# Patient Record
Sex: FEMALE | Race: WHITE | Employment: STUDENT | URBAN - METROPOLITAN AREA
[De-identification: names, ages, dates, MRNs, and addresses within clinical notes are randomized per-mention and may not be internally consistent; named-entity substitution may affect disease eponyms.]

---

## 2017-08-29 ENCOUNTER — HOSPITAL ENCOUNTER (OUTPATIENT)
Dept: PHYSICAL THERAPY | Age: 22
Discharge: HOME | End: 2017-08-29
Payer: COMMERCIAL

## 2017-08-29 PROCEDURE — 97161 PT EVAL LOW COMPLEX 20 MIN: CPT

## 2017-08-29 PROCEDURE — 97140 MANUAL THERAPY 1/> REGIONS: CPT

## 2017-08-29 PROCEDURE — 97110 THERAPEUTIC EXERCISES: CPT

## 2017-08-29 NOTE — PROGRESS NOTES
Ambulatory/Rehab Services H2 Model Falls Risk Assessment    Risk Factor Pts. ·   Confusion/Disorientation/Impulsivity  []    4 ·   Symptomatic Depression  []   2 ·   Altered Elimination  []   1 ·   Dizziness/Vertigo  []   1 ·   Gender (Male)  []   1 ·   Any administered antiepileptics (anticonvulsants):  []   2 ·   Any administered benzodiazepines:  []   1 ·   Visual Impairment (specify):  []   1 ·   Portable Oxygen Use  []   1 ·   Orthostatic ? BP  []   1 ·   History of Recent Falls (within 3 mos.)  []   5     Ability to Rise from Chair (choose one) Pts. ·   Ability to rise in a single movement  [x]   0 ·   Pushes up, successful in one attempt  []   1 ·   Multiple attempts, but successful  []   3 ·   Unable to rise without assistance  []   4   Total: (5 or greater = High Risk) 0     Falls Prevention Plan:   []                Physical Limitations to Exercise (specify):   []                Mobility Assistance Device (type):   []                Exercise/Equipment Adaptation (specify):    ©2010 [facility] [address] Patent #[id number].  Federal Law prohibits the replication, distribution or use without written permission from [facility] [employer]

## 2017-08-29 NOTE — PROGRESS NOTES
REHABILITATION [facility]  : [address] [facility] [address]  [address] [facility] [place].  [phone]   [phone]        OUTPATIENT PHYSICAL THERAPY:Initial Assessment 2017        ICD-10: Treatment Diagnosis: Pain in left foot (M79.672)Muscle weakness (generalized) (M62.81)  Precautions/Allergies:   Review of patient's allergies indicates not on file. Fall Risk Score: 0 (? 5 = High Risk)  MD Orders: Evaluation and treatment  MEDICAL/REFERRING DIAGNOSIS:  Pain in unspecified foot [M79.673]  DATE OF ONSET: 2017  REFERRING PHYSICIAN: Bubba Plata MD  RETURN PHYSICIAN APPOINTMENT: 17     INITIAL ASSESSMENT:  Ms. John Mosley presents with decreased strength in left ankle and myofascial restriction of intrinsic foot flexors. Her impairments limit her ability to ambulate and participation in [employer]. Recommend PT services to remediate her impairments. PROBLEM LIST (Impacting functional limitations):  1. Decreased Strength  2. Decreased ADL/Functional Activities  3. Decreased Ambulation Ability/Technique  4. Decreased Balance  5. Increased Pain  6. Decreased Flexibility/Joint Mobility INTERVENTIONS PLANNED:  1. Balance Exercise  2. Cold  3. Cryotherapy  4. Electrical Stimulation  5. Gait Training  6. Heat  7. Home Exercise Program (HEP)  8. Manual Therapy  9. Range of Motion (ROM)  10. Therapeutic Activites  11. Therapeutic Exercise/Strengthening  12. Ultrasound (US)   TREATMENT PLAN:  Effective Dates: 17 TO 17. Frequency/Duration: 2 times a week for 12 weeks  GOALS: (Goals have been discussed and agreed upon with patient.)  Short-Term Functional Goals: Time Frame: 2 weeks  1. Patient will be independent with HEP without pain. 2. Patient will be able to ambulate without immobilizer boot without increased pain. Discharge Goals: Time Frame: 12 weeks  1.  Patient will have improve LEFS to> 70/80 points allowing her to return to full activity. 2. Patient will have improved ankle mmt to 5-/5 allowing her to ascend/descend stairs without pain. 3. Patient will have improve function allowing her to run 1 mile without increased pain. Rehabilitation Potential For Stated Goals: Good  Regarding [facility] Ananda's therapy, I certify that the treatment plan above will be carried out by a therapist or under their direction. Thank you for this referral,  Charmaine Dorado, PT       Referring Physician Signature: Alcides Howard MD              Date                      HISTORY:   History of Present Injury/Illness (Reason for Referral):  24 y/o F with c/o of left plantar foot pain at her 1st MTP joint since June 2017. She has been wearing boot to walk and she does not have pain walking with the boot. She has 3/10 pain if she walks without the boot recently. She has recently saw a local orthopedic surgeon who diagnosed sesamoiditis and she saw her PT in [facility] who diagnosed sesamoiditis. She has a history of left ankle distal fibular fracture and ankle sprain that she has recovered from. She is on the [employer] team but is out of participation now due to her foot pain. She will be wearing the boot for 2 more weeks until she has f/u with ortho. Past Medical History/Comorbidities:   Ms. Nellie Mckeon  has no past medical history on file. Ms. Nellie Mckeon  has no past surgical history on file. Social History/Living Environment:    Lives in apartment  Prior Level of Function/Work/Activity:  Inpdependent  Previous Treatment Approaches:          none    Current Medications:  No current outpatient prescriptions on file.    Date Last Reviewed:  8/29/2017   # of Personal Factors/Comorbidities that affect the Plan of Care: 0: LOW COMPLEXITY   EXAMINATION:   Observation/Orthostatic Postural Assessment:          Wearing left ankle immobilizer   Palpation:          Increased pain to left plantar MTP joint, left post tibialis  ROM:     WNL Strength:     mmt   ankle DF L 4+ R 5-  Ankle PF L 3+ R 5-  Ankle IN L 3+ R 5-  Ankle EV : 3+ R 5-  1st digit extension L 3+ pain R 5-  1st digit flexion L 3+ pain R 5-      Special Tests:          Windlass painful L ankle  Neurological Screen:        Sensation: light touch intact  Functional Mobility:         Gait/Ambulation:  Without boot, left foot toe-in gait pattern with stance, decreased toe push off left LE  Balance:          SLB WNL without pain    Body Structures Involved:  1. Joints  2. Muscles Body Functions Affected:  1. Neuromusculoskeletal  2. Movement Related Activities and Participation Affected:  1. General Tasks and Demands  2. Mobility  3. Domestic Life  4. Community, Social and [place]   # of elements that affect the Plan of Care: 1-2: LOW COMPLEXITY   CLINICAL PRESENTATION:   Presentation: Stable and uncomplicated: LOW COMPLEXITY   CLINICAL DECISION MAKING:   Outcome Measure: Tool Used: Lower Extremity Functional Scale (LEFS)  Score:  Initial: 42/80 Most Recent: X/80 (Date: -- )   Interpretation of Score: 20 questions each scored on a 5 point scale with 0 representing \"extreme difficulty or unable to perform\" and 4 representing \"no difficulty\". The lower the score, the greater the functional disability. 80/80 represents no disability. Minimal detectable change is 9 points. Score 80 79-63 62-48 47-32 31-16 15-1 0   Modifier CH CI CJ CK CL CM CN     Medical Necessity:   · Patient is expected to demonstrate progress in strength, range of motion and functional technique to increase independence with gait. Reason for Services/Other Comments:  · Patient has been observed to have decreased strength before, during or after an intervention.    Use of outcome tool(s) and clinical judgement create a POC that gives a: Clear prediction of patient's progress: LOW COMPLEXITY   TREATMENT:   (In addition to Assessment/Re-Assessment sessions the following treatments were rendered)  THERAPEUTIC EXERCISE: (see grid for minutes):  Exercises per grid below to improve mobility, strength and balance. Required moderate visual, verbal, manual and tactile cues to promote proper body alignment, promote proper body posture and promote proper body mechanics. Progressed resistance, range and repetitions as indicated. MANUAL THERAPY: (see grid for minutes): Joint mobilization and Soft tissue mobilization was utilized and necessary because of the patient's restricted joint motion, painful spasm, loss of articular motion and restricted motion of soft tissue. MODALITIES: (see grid for minutes): *  Electrical Stimulation Therapy (IFC) was provided with intensity adjusted throughout treatment to patient tolerance. to reduce pain       *  Cold Pack Therapy in order to provide analgesia and reduce inflammation and edema. *  Hot Pack Therapy in order to relieve muscle spasm. Date: 8/29/17       Modalities:                                Therapeutic Exercise: 10 min       Ankle TB 4 dir and first digit DF RTB 30x each       PF stretch 3x30 sec hold       PF MFR with lacrosse ball 3x1'                               Proprioceptive Activities:                                Manual Therapy: 15 mins       MFR PF, post tib, intrinsic flexors, L MTP distraction L ankle               Therapeutic Activities:                                        HEP: Provided HEP on 8/29/17  Treatment/Session Assessment:  Demonstrated good teach back with HEP. · Pain/ Symptoms: Initial:   1/10 Post Session:  1/10 ·   Compliance with Program/Exercises: Will assess as treatment progresses. · Recommendations/Intent for next treatment session: \"Next visit will focus on advancements to more challenging activities\".   Total Treatment Duration:  PT Patient Time In/Time Out  Time In: 1400  [facility]: [address] [facility] [place] [address] [place],

## 2017-08-31 ENCOUNTER — HOSPITAL ENCOUNTER (OUTPATIENT)
Dept: PHYSICAL THERAPY | Age: 22
Discharge: HOME | End: 2017-08-31
Payer: COMMERCIAL

## 2017-08-31 PROCEDURE — 97110 THERAPEUTIC EXERCISES: CPT

## 2017-08-31 PROCEDURE — 97140 MANUAL THERAPY 1/> REGIONS: CPT

## 2017-08-31 PROCEDURE — 97035 APP MDLTY 1+ULTRASOUND EA 15: CPT

## 2017-08-31 NOTE — PROGRESS NOTES
REHABILITATION [facility]  : [address] [facility] [address]  [address] [place] [address] [facility] [place] Vang.  [phone]   [phone]        OUTPATIENT PHYSICAL THERAPY:Daily Note 2017        ICD-10: Treatment Diagnosis: Pain in left foot (M79.672)Muscle weakness (generalized) (M62.81)  Precautions/Allergies:   Review of patient's allergies indicates not on file. Fall Risk Score: 0 (? 5 = High Risk)  MD Orders: Evaluation and treatment  MEDICAL/REFERRING DIAGNOSIS:  Pain in unspecified foot [M79.673]  DATE OF ONSET: 2017  REFERRING PHYSICIAN: Gabriel Gloria MD  RETURN PHYSICIAN APPOINTMENT: 17     INITIAL ASSESSMENT:  Ms. Jose Johnson presents with decreased strength in left ankle and myofascial restriction of intrinsic foot flexors. Her impairments limit her ability to ambulate and participation in [employer]. Recommend PT services to remediate her impairments. PROBLEM LIST (Impacting functional limitations):  1. Decreased Strength  2. Decreased ADL/Functional Activities  3. Decreased Ambulation Ability/Technique  4. Decreased Balance  5. Increased Pain  6. Decreased Flexibility/Joint Mobility INTERVENTIONS PLANNED:  1. Balance Exercise  2. Cold  3. Cryotherapy  4. Electrical Stimulation  5. Gait Training  6. Heat  7. Home Exercise Program (HEP)  8. Manual Therapy  9. Range of Motion (ROM)  10. Therapeutic Activites  11. Therapeutic Exercise/Strengthening  12. Ultrasound (US)   TREATMENT PLAN:  Effective Dates: 17 TO 17. Frequency/Duration: 2 times a week for 12 weeks  GOALS: (Goals have been discussed and agreed upon with patient.)  Short-Term Functional Goals: Time Frame: 2 weeks  1. Patient will be independent with HEP without pain. 2. Patient will be able to ambulate without immobilizer boot without increased pain. Discharge Goals: Time Frame: 12 weeks  1.  Patient will have improve LEFS to> 70/80 points allowing her to return to full activity. 2. Patient will have improved ankle mmt to 5-/5 allowing her to ascend/descend stairs without pain. 3. Patient will have improve function allowing her to run 1 mile without increased pain. Rehabilitation Potential For Stated Goals: Good                HISTORY:   History of Present Injury/Illness (Reason for Referral):  24 y/o F with c/o of left plantar foot pain at her 1st MTP joint since June 2017. She has been wearing boot to walk and she does not have pain walking with the boot. She has 3/10 pain if she walks without the boot recently. She has recently saw a local orthopedic surgeon who diagnosed sesamoiditis and she saw her PT in [facility] who diagnosed sesamoiditis. She has a history of left ankle distal fibular fracture and ankle sprain that she has recovered from. She is on the [employer] team but is out of participation now due to her foot pain. She will be wearing the boot for 2 more weeks until she has f/u with ortho. Past Medical History/Comorbidities:   Ms. Nellie Mckeon  has no past medical history on file. Ms. Nellie Mckeon  has no past surgical history on file. Social History/Living Environment:    Lives in apartment  Prior Level of Function/Work/Activity:  Inpdependent  Previous Treatment Approaches:          none    Current Medications:  No current outpatient prescriptions on file.    Date Last Reviewed:  8/31/2017   EXAMINATION:   Observation/Orthostatic Postural Assessment:          Wearing left ankle immobilizer   Palpation:          Increased pain to left plantar MTP joint, left post tibialis  ROM:     WNL      Strength:     mmt   ankle DF L 4+ R 5-  Ankle PF L 3+ R 5-  Ankle IN L 3+ R 5-  Ankle EV : 3+ R 5-  1st digit extension L 3+ pain R 5-  1st digit flexion L 3+ pain R 5-      Special Tests:          Windlass painful L ankle  Neurological Screen:        Sensation: light touch intact  Functional Mobility:         Gait/Ambulation:  Without boot, left foot toe-in gait pattern with stance, decreased toe push off left LE  Balance:          SLB WNL without pain    Body Structures Involved:  1. Joints  2. Muscles Body Functions Affected:  1. Neuromusculoskeletal  2. Movement Related Activities and Participation Affected:  1. General Tasks and Demands  2. Mobility  3. Domestic Life  4. Community, Social and Civic Life   CLINICAL PRESENTATION:   CLINICAL DECISION MAKING:   Outcome Measure: Tool Used: Lower Extremity Functional Scale (LEFS)  Score:  Initial: 42/80 Most Recent: X/80 (Date: -- )   Interpretation of Score: 20 questions each scored on a 5 point scale with 0 representing \"extreme difficulty or unable to perform\" and 4 representing \"no difficulty\". The lower the score, the greater the functional disability. 80/80 represents no disability. Minimal detectable change is 9 points. Score 80 79-63 62-48 47-32 31-16 15-1 0   Modifier CH CI CJ CK CL CM CN     Medical Necessity:   · Patient is expected to demonstrate progress in strength, range of motion and functional technique to increase independence with gait. Reason for Services/Other Comments:  · Patient has been observed to have decreased strength before, during or after an intervention. TREATMENT:   (In addition to Assessment/Re-Assessment sessions the following treatments were rendered)  THERAPEUTIC EXERCISE: (see grid for minutes):  Exercises per grid below to improve mobility, strength and balance. Required moderate visual, verbal, manual and tactile cues to promote proper body alignment, promote proper body posture and promote proper body mechanics. Progressed resistance, range and repetitions as indicated. MANUAL THERAPY: (see grid for minutes): Joint mobilization and Soft tissue mobilization was utilized and necessary because of the patient's restricted joint motion, painful spasm, loss of articular motion and restricted motion of soft tissue. MODALITIES: (see grid for minutes):       *  Electrical Stimulation Therapy (IFC) was provided with intensity adjusted throughout treatment to patient tolerance. to reduce pain       *  Cold Pack Therapy in order to provide analgesia and reduce inflammation and edema. *  Hot Pack Therapy in order to relieve muscle spasm. Date: 8/29/17 8/31/17  (visit 2)      Modalities:  8 min      %   1mhx 1.5 intesnity                      Therapeutic Exercise: 10 min 15 min      Ankle TB 4 dir and first digit DF RTB 30x each       PF stretch 3x30 sec hold 3x30 sec hold      PF MFR with lacrosse ball 3x1'       Seated HR/TR  30x      Towel in/ev  3' total      SLB iso arch  L 30\"      Proprioceptive Activities:                                Manual Therapy: 15 mins 15 min      MFR PF, post tib, intrinsic flexors, L MTP distraction L ankle FDN/MFR abductor and flexor hallicis              Therapeutic Activities:                                        HEP: Provided HEP on 8/29/17  Treatment/Session Assessment:  SHe reported decreased pain with gait after FDN. · Pain/ Symptoms: Initial:   1/10 \"it still hurts. I am doing my HEP. \" Post Session:  1/10 No increased pain ·   Compliance with Program/Exercises: Will assess as treatment progresses. · Recommendations/Intent for next treatment session: \"Next visit will focus on advancements to more challenging activities\".   Total Treatment Duration:  PT Patient Time In/Time Out  Time In: 1400  Time Out: [address],

## 2017-09-05 ENCOUNTER — HOSPITAL ENCOUNTER (OUTPATIENT)
Dept: PHYSICAL THERAPY | Age: 22
Discharge: HOME | End: 2017-09-05
Payer: COMMERCIAL

## 2017-09-05 PROCEDURE — 97035 APP MDLTY 1+ULTRASOUND EA 15: CPT

## 2017-09-05 PROCEDURE — 97110 THERAPEUTIC EXERCISES: CPT

## 2017-09-05 PROCEDURE — 97140 MANUAL THERAPY 1/> REGIONS: CPT

## 2017-09-05 NOTE — PROGRESS NOTES
REHABILITATION [facility]  : [address] [facility] [address]  [address] [facility] [place].  [phone]   [phone]        OUTPATIENT PHYSICAL THERAPY:Daily Note 2017        ICD-10: Treatment Diagnosis: Pain in left foot (M79.672)Muscle weakness (generalized) (M62.81)  Precautions/Allergies:   Review of patient's allergies indicates not on file. Fall Risk Score: 0 (? 5 = High Risk)  MD Orders: Evaluation and treatment  MEDICAL/REFERRING DIAGNOSIS:  Pain in unspecified foot [M79.673]  DATE OF ONSET: 2017  REFERRING PHYSICIAN: Manda Arevalo MD  RETURN PHYSICIAN APPOINTMENT: 17     INITIAL ASSESSMENT:  Ms. Anson Connell presents with decreased strength in left ankle and myofascial restriction of intrinsic foot flexors. Her impairments limit her ability to ambulate and participation in [email or web address]. Recommend PT services to remediate her impairments. PROBLEM LIST (Impacting functional limitations):  1. Decreased Strength  2. Decreased ADL/Functional Activities  3. Decreased Ambulation Ability/Technique  4. Decreased Balance  5. Increased Pain  6. Decreased Flexibility/Joint Mobility INTERVENTIONS PLANNED:  1. Balance Exercise  2. Cold  3. Cryotherapy  4. Electrical Stimulation  5. Gait Training  6. Heat  7. Home Exercise Program (HEP)  8. Manual Therapy  9. Range of Motion (ROM)  10. Therapeutic Activites  11. Therapeutic Exercise/Strengthening  12. Ultrasound (US)   TREATMENT PLAN:  Effective Dates: 17 TO 17. Frequency/Duration: 2 times a week for 12 weeks  GOALS: (Goals have been discussed and agreed upon with patient.)  Short-Term Functional Goals: Time Frame: 2 weeks  1. Patient will be independent with HEP without pain. 2. Patient will be able to ambulate without immobilizer boot without increased pain. Discharge Goals: Time Frame: 12 weeks  1.  Patient will have improve LEFS to> 70/80 points allowing her to return to full activity. 2. Patient will have improved ankle mmt to 5-/5 allowing her to ascend/descend stairs without pain. 3. Patient will have improve function allowing her to run 1 mile without increased pain. Rehabilitation Potential For Stated Goals: Good                HISTORY:   History of Present Injury/Illness (Reason for Referral):  24 y/o F with c/o of left plantar foot pain at her 1st MTP joint since June 2017. She has been wearing boot to walk and she does not have pain walking with the boot. She has 3/10 pain if she walks without the boot recently. She has recently saw a local orthopedic surgeon who diagnosed sesamoiditis and she saw her PT in [facility] who diagnosed sesamoiditis. She has a history of left ankle distal fibular fracture and ankle sprain that she has recovered from. She is on the [employer] team but is out of participation now due to her foot pain. She will be wearing the boot for 2 more weeks until she has f/u with ortho. Past Medical History/Comorbidities:   Ms. Yesika Bertrand  has no past medical history on file. Ms. Yesika Bertrand  has no past surgical history on file. Social History/Living Environment:    Lives in apartment  Prior Level of Function/Work/Activity:  Inpdependent  Previous Treatment Approaches:          none    Current Medications:  No current outpatient prescriptions on file.    Date Last Reviewed:  9/5/2017   EXAMINATION:   Observation/Orthostatic Postural Assessment:          Wearing left ankle immobilizer   Palpation:          Increased pain to left plantar MTP joint, left post tibialis  ROM:     WNL      Strength:     mmt   ankle DF L 4+ R 5-  Ankle PF L 3+ R 5-  Ankle IN L 3+ R 5-  Ankle EV : 3+ R 5-  1st digit extension L 3+ pain R 5-  1st digit flexion L 3+ pain R 5-      Special Tests:          Windlass painful L ankle  Neurological Screen:        Sensation: light touch intact  Functional Mobility:         Gait/Ambulation:  Without boot, left foot toe-in gait pattern with stance, decreased toe push off left LE  Balance:          SLB WNL without pain    Body Structures Involved:  1. Joints  2. Muscles Body Functions Affected:  1. Neuromusculoskeletal  2. Movement Related Activities and Participation Affected:  1. General Tasks and Demands  2. Mobility  3. Domestic Life  4. Community, Social and Civic Life   CLINICAL PRESENTATION:   CLINICAL DECISION MAKING:   Outcome Measure: Tool Used: Lower Extremity Functional Scale (LEFS)  Score:  Initial: 42/80 Most Recent: X/80 (Date: -- )   Interpretation of Score: 20 questions each scored on a 5 point scale with 0 representing \"extreme difficulty or unable to perform\" and 4 representing \"no difficulty\". The lower the score, the greater the functional disability. 80/80 represents no disability. Minimal detectable change is 9 points. Score 80 79-63 62-48 47-32 31-16 15-1 0   Modifier CH CI CJ CK CL CM CN     Medical Necessity:   · Patient is expected to demonstrate progress in strength, range of motion and functional technique to increase independence with gait. Reason for Services/Other Comments:  · Patient has been observed to have decreased strength before, during or after an intervention. TREATMENT:   (In addition to Assessment/Re-Assessment sessions the following treatments were rendered)  THERAPEUTIC EXERCISE: (see grid for minutes):  Exercises per grid below to improve mobility, strength and balance. Required moderate visual, verbal, manual and tactile cues to promote proper body alignment, promote proper body posture and promote proper body mechanics. Progressed resistance, range and repetitions as indicated. MANUAL THERAPY: (see grid for minutes): Joint mobilization and Soft tissue mobilization was utilized and necessary because of the patient's restricted joint motion, painful spasm, loss of articular motion and restricted motion of soft tissue. MODALITIES: (see grid for minutes):       *  Electrical Stimulation Therapy (IFC) was provided with intensity adjusted throughout treatment to patient tolerance. to reduce pain       *  Cold Pack Therapy in order to provide analgesia and reduce inflammation and edema. *  Hot Pack Therapy in order to relieve muscle spasm. Date: 8/29/17 8/31/17  (visit 2) 9/05/17 (visit 3)     Modalities:  8 min 8 min     %   1mhx 1.5 intesnity 1 Mhz, 1.1 w/cm2                     Therapeutic Exercise: 10 min 15 min 15 min     Ankle TB 4 dir and first digit DF RTB 30x each  x20 each red     PF stretch 3x30 sec hold 3x30 sec hold      PF MFR with lacrosse ball 3x1'       Seated HR/TR  30x x30 each     Ankle circles with board   x30 clockwise and counterclockwise     Towel in/ev  3' total      SLB iso arch  L 30\" 30 sec hold x 2 Left     Proprioceptive Activities:                                Manual Therapy: 15 mins 15 min 20 min     MFR PF, post tib, intrinsic flexors, L MTP distraction L ankle FDN/MFR abductor and flexor hallicis FDN/Dry needling to abductor and flexor hallicis     STM/IASTM   To posterior tibilis and plantar surface of foot     Therapeutic Activities:                                        HEP: Provided HEP on 8/29/17  Treatment/Session Assessment: Dry needling performed by the certified dry needling PT. Pt did not report increased pain with exercise or STM. Continue POC. · Pain/ Symptoms: Initial:   1/10 L foot    Pt states \"I think the dry needling helped. \" Post Session:  1/10 No increased pain ·   Compliance with Program/Exercises: Will assess as treatment progresses. · Recommendations/Intent for next treatment session: \"Next visit will focus on advancements to more challenging activities\".   Total Treatment Duration:  PT Patient Time In/Time Out  Time In: 0245  Time Out: Bess Ricci 27, PTA

## 2017-09-08 ENCOUNTER — HOSPITAL ENCOUNTER (OUTPATIENT)
Dept: PHYSICAL THERAPY | Age: 22
Discharge: HOME | End: 2017-09-08
Payer: COMMERCIAL

## 2017-09-08 PROCEDURE — 97140 MANUAL THERAPY 1/> REGIONS: CPT

## 2017-09-08 PROCEDURE — 97035 APP MDLTY 1+ULTRASOUND EA 15: CPT

## 2017-09-08 PROCEDURE — 97110 THERAPEUTIC EXERCISES: CPT

## 2017-09-08 NOTE — PROGRESS NOTES
REHABILITATION [facility]  : [address] [facility] [place] [address]  [address] [facility] [address] [place].  [phone]   [phone]        OUTPATIENT PHYSICAL THERAPY:Daily Note 2017        ICD-10: Treatment Diagnosis: Pain in left foot (M79.672)Muscle weakness (generalized) (M62.81)  Precautions/Allergies:   Review of patient's allergies indicates not on file. Fall Risk Score: 0 (? 5 = High Risk)  MD Orders: Evaluation and treatment  MEDICAL/REFERRING DIAGNOSIS:  Pain in unspecified foot [M79.673]  DATE OF ONSET: 2017  REFERRING PHYSICIAN: Gabriel Gloria MD  RETURN PHYSICIAN APPOINTMENT: 17     INITIAL ASSESSMENT:  Ms. Jose Johnson presents with decreased strength in left ankle and myofascial restriction of intrinsic foot flexors. Her impairments limit her ability to ambulate and participation in [email or web address]. Recommend PT services to remediate her impairments. PROBLEM LIST (Impacting functional limitations):  1. Decreased Strength  2. Decreased ADL/Functional Activities  3. Decreased Ambulation Ability/Technique  4. Decreased Balance  5. Increased Pain  6. Decreased Flexibility/Joint Mobility INTERVENTIONS PLANNED:  1. Balance Exercise  2. Cold  3. Cryotherapy  4. Electrical Stimulation  5. Gait Training  6. Heat  7. Home Exercise Program (HEP)  8. Manual Therapy  9. Range of Motion (ROM)  10. Therapeutic Activites  11. Therapeutic Exercise/Strengthening  12. Ultrasound (US)   TREATMENT PLAN:  Effective Dates: 17 TO 17. Frequency/Duration: 2 times a week for 12 weeks  GOALS: (Goals have been discussed and agreed upon with patient.)  Short-Term Functional Goals: Time Frame: 2 weeks  1. Patient will be independent with HEP without pain. 2. Patient will be able to ambulate without immobilizer boot without increased pain. Discharge Goals: Time Frame: 12 weeks  1.  Patient will have improve LEFS to> 70/80 points allowing her to return to full activity. 2. Patient will have improved ankle mmt to 5-/5 allowing her to ascend/descend stairs without pain. 3. Patient will have improve function allowing her to run 1 mile without increased pain. Rehabilitation Potential For Stated Goals: Good                HISTORY:   History of Present Injury/Illness (Reason for Referral):  24 y/o F with c/o of left plantar foot pain at her 1st MTP joint since June 2017. She has been wearing boot to walk and she does not have pain walking with the boot. She has 3/10 pain if she walks without the boot recently. She has recently saw a local orthopedic surgeon who diagnosed sesamoiditis and she saw her PT in [facility] who diagnosed sesamoiditis. She has a history of left ankle distal fibular fracture and ankle sprain that she has recovered from. She is on the [employer] team but is out of participation now due to her foot pain. She will be wearing the boot for 2 more weeks until she has f/u with ortho. Past Medical History/Comorbidities:   Ms. Josh Sicard  has no past medical history on file. Ms. Josh Sicard  has no past surgical history on file. Social History/Living Environment:    Lives in apartment  Prior Level of Function/Work/Activity:  Inpdependent  Previous Treatment Approaches:          none    Current Medications:  No current outpatient prescriptions on file.    Date Last Reviewed:  9/8/2017   EXAMINATION:   Observation/Orthostatic Postural Assessment:          Wearing left ankle immobilizer   Palpation:          Increased pain to left plantar MTP joint, left post tibialis  ROM:     WNL      Strength:     mmt   ankle DF L 4+ R 5-  Ankle PF L 3+ R 5-  Ankle IN L 3+ R 5-  Ankle EV : 3+ R 5-  1st digit extension L 3+ pain R 5-  1st digit flexion L 3+ pain R 5-      Special Tests:          Windlass painful L ankle  Neurological Screen:        Sensation: light touch intact  Functional Mobility:         Gait/Ambulation:  Without boot, left foot toe-in gait pattern with stance, decreased toe push off left LE  Balance:          SLB WNL without pain    Body Structures Involved:  1. Joints  2. Muscles Body Functions Affected:  1. Neuromusculoskeletal  2. Movement Related Activities and Participation Affected:  1. General Tasks and Demands  2. Mobility  3. Domestic Life  4. Community, Social and Civic Life   CLINICAL PRESENTATION:   CLINICAL DECISION MAKING:   Outcome Measure: Tool Used: Lower Extremity Functional Scale (LEFS)  Score:  Initial: 42/80 Most Recent: X/80 (Date: -- )   Interpretation of Score: 20 questions each scored on a 5 point scale with 0 representing \"extreme difficulty or unable to perform\" and 4 representing \"no difficulty\". The lower the score, the greater the functional disability. 80/80 represents no disability. Minimal detectable change is 9 points. Score 80 79-63 62-48 47-32 31-16 15-1 0   Modifier CH CI CJ CK CL CM CN     Medical Necessity:   · Patient is expected to demonstrate progress in strength, range of motion and functional technique to increase independence with gait. Reason for Services/Other Comments:  · Patient has been observed to have decreased strength before, during or after an intervention. TREATMENT:   (In addition to Assessment/Re-Assessment sessions the following treatments were rendered)  THERAPEUTIC EXERCISE: (see grid for minutes):  Exercises per grid below to improve mobility, strength and balance. Required moderate visual, verbal, manual and tactile cues to promote proper body alignment, promote proper body posture and promote proper body mechanics. Progressed resistance, range and repetitions as indicated. MANUAL THERAPY: (see grid for minutes): Joint mobilization and Soft tissue mobilization was utilized and necessary because of the patient's restricted joint motion, painful spasm, loss of articular motion and restricted motion of soft tissue. MODALITIES: (see grid for minutes):       *  Electrical Stimulation Therapy (IFC) was provided with intensity adjusted throughout treatment to patient tolerance. to reduce pain       *  Cold Pack Therapy in order to provide analgesia and reduce inflammation and edema. *  Hot Pack Therapy in order to relieve muscle spasm. Date: 8/29/17 8/31/17  (visit 2) 9/05/17 (visit 3) 9/08/17 (visit 4)    Modalities:  8 min 8 min 8 min    %   1mhx 1.5 intesnity 1 Mhz, 1.1 w/cm2 1 Mhz, 1.2 w/cm2                    Therapeutic Exercise: 10 min 15 min 15 min 20 min    Ankle TB 4 dir and first digit DF RTB 30x each  x20 each red 3x10 each blue    PF stretch 3x30 sec hold 3x30 sec hold      PF MFR with lacrosse ball 3x1'       4 way SLR    x10 each direction, BLE    Seated HR/TR  30x x30 each x30 each    Ankle circles with board   x30 clockwise and counterclockwise     Towel curls    x50     Towel in/ev  3' total  x20 each    SLB iso arch  L 30\" 30 sec hold x 2 Left     Proprioceptive Activities:                                Manual Therapy: 15 mins 15 min 20 min 15 min    MFR PF, post tib, intrinsic flexors, L MTP distraction L ankle FDN/MFR abductor and flexor hallicis FDN/Dry needling to abductor and flexor hallicis     STM/IASTM   To posterior tibilis and plantar surface of foot STM to intrinsic foot muscles, gastroc, and post tib    Therapeutic Activities:                                        HEP: Provided HEP on 8/29/17  Treatment/Session Assessment: Pt did not report increased pain and non-weight bearing hip exercises were added to the HEP. Pt was also advised to ice when she has pain. Continue POC. · Pain/ Symptoms: Initial:   3/10 L foot and ankle    Pt states \"I'm not wearing the boot any more so I'm having more pain when I walk to class. \" Post Session:  1/10 No increased pain ·   Compliance with Program/Exercises: Will assess as treatment progresses. · Recommendations/Intent for next treatment session:  \"Next visit will focus on advancements to more challenging activities\".   Total Treatment Duration:  PT Patient Time In/Time Out  Time In: 1145  Time Out: [address], PTA

## 2017-09-11 NOTE — PROGRESS NOTES
REHABILITATION [facility]  : [address] [facility] [address]  [address] [facility] [place].  [phone]   [facility] [phone]        OUTPATIENT PHYSICAL THERAPY:Daily Note 2017        ICD-10: Treatment Diagnosis: Pain in left foot (M79.672)Muscle weakness (generalized) (M62.81)  Precautions/Allergies:   Review of patient's allergies indicates not on file. Fall Risk Score: 0 (? 5 = High Risk)  MD Orders: Evaluation and treatment  MEDICAL/REFERRING DIAGNOSIS:  Pain in unspecified foot [M79.673]  DATE OF ONSET: 2017  REFERRING PHYSICIAN: Manda Arevalo MD  RETURN PHYSICIAN APPOINTMENT: 17     INITIAL ASSESSMENT:  Ms. Anson Connell presents with decreased strength in left ankle and myofascial restriction of intrinsic foot flexors. Her impairments limit her ability to ambulate and participation in [employer]. Recommend PT services to remediate her impairments. PROBLEM LIST (Impacting functional limitations):  1. Decreased Strength  2. Decreased ADL/Functional Activities  3. Decreased Ambulation Ability/Technique  4. Decreased Balance  5. Increased Pain  6. Decreased Flexibility/Joint Mobility INTERVENTIONS PLANNED:  1. Balance Exercise  2. Cold  3. Cryotherapy  4. Electrical Stimulation  5. Gait Training  6. Heat  7. Home Exercise Program (HEP)  8. Manual Therapy  9. Range of Motion (ROM)  10. Therapeutic Activites  11. Therapeutic Exercise/Strengthening  12. Ultrasound (US)   TREATMENT PLAN:  Effective Dates: 17 TO 17. Frequency/Duration: 2 times a week for 12 weeks  GOALS: (Goals have been discussed and agreed upon with patient.)  Short-Term Functional Goals: Time Frame: 2 weeks  1. Patient will be independent with HEP without pain. 2. Patient will be able to ambulate without immobilizer boot without increased pain. Discharge Goals: Time Frame: 12 weeks  1.  Patient will have improve LEFS to> 70/80 points allowing her to return to full activity. 2. Patient will have improved ankle mmt to 5-/5 allowing her to ascend/descend stairs without pain. 3. Patient will have improve function allowing her to run 1 mile without increased pain. Rehabilitation Potential For Stated Goals: Good                HISTORY:   History of Present Injury/Illness (Reason for Referral):  24 y/o F with c/o of left plantar foot pain at her 1st MTP joint since June 2017. She has been wearing boot to walk and she does not have pain walking with the boot. She has 3/10 pain if she walks without the boot recently. She has recently saw a local orthopedic surgeon who diagnosed sesamoiditis and she saw her PT in [facility] who diagnosed sesamoiditis. She has a history of left ankle distal fibular fracture and ankle sprain that she has recovered from. She is on the [employer] team but is out of participation now due to her foot pain. She will be wearing the boot for 2 more weeks until she has f/u with ortho. Past Medical History/Comorbidities:   Ms. Ori Mathews  has no past medical history on file. Ms. Ori Mathews  has no past surgical history on file. Social History/Living Environment:    Lives in apartment  Prior Level of Function/Work/Activity:  Inpdependent  Previous Treatment Approaches:          none    Current Medications:  No current outpatient prescriptions on file.    Date Last Reviewed:  9/12/2017   EXAMINATION:   Observation/Orthostatic Postural Assessment:          Wearing left ankle immobilizer   Palpation:          Increased pain to left plantar MTP joint, left post tibialis  ROM:     WNL      Strength:     mmt   ankle DF L 4+ R 5-  Ankle PF L 3+ R 5-  Ankle IN L 3+ R 5-  Ankle EV : 3+ R 5-  1st digit extension L 3+ pain R 5-  1st digit flexion L 3+ pain R 5-      Special Tests:          Windlass painful L ankle  Neurological Screen:        Sensation: light touch intact  Functional Mobility:         Gait/Ambulation:  Without boot, left foot toe-in gait pattern with stance, decreased toe push off left LE  Balance:          SLB WNL without pain    Body Structures Involved:  1. Joints  2. Muscles Body Functions Affected:  1. Neuromusculoskeletal  2. Movement Related Activities and Participation Affected:  1. General Tasks and Demands  2. Mobility  3. Domestic Life  4. Community, Social and Civic Life   CLINICAL PRESENTATION:   CLINICAL DECISION MAKING:   Outcome Measure: Tool Used: Lower Extremity Functional Scale (LEFS)  Score:  Initial: 42/80 Most Recent: X/80 (Date: -- )   Interpretation of Score: 20 questions each scored on a 5 point scale with 0 representing \"extreme difficulty or unable to perform\" and 4 representing \"no difficulty\". The lower the score, the greater the functional disability. 80/80 represents no disability. Minimal detectable change is 9 points. Score 80 79-63 62-48 47-32 31-16 15-1 0   Modifier CH CI CJ CK CL CM CN     Medical Necessity:   · Patient is expected to demonstrate progress in strength, range of motion and functional technique to increase independence with gait. Reason for Services/Other Comments:  · Patient has been observed to have decreased strength before, during or after an intervention. TREATMENT:   (In addition to Assessment/Re-Assessment sessions the following treatments were rendered)  THERAPEUTIC EXERCISE: (see grid for minutes):  Exercises per grid below to improve mobility, strength and balance. Required moderate visual, verbal, manual and tactile cues to promote proper body alignment, promote proper body posture and promote proper body mechanics. Progressed resistance, range and repetitions as indicated. MANUAL THERAPY: (see grid for minutes): Joint mobilization and Soft tissue mobilization was utilized and necessary because of the patient's restricted joint motion, painful spasm, loss of articular motion and restricted motion of soft tissue. MODALITIES: (see grid for minutes):       *  Electrical Stimulation Therapy (IFC) was provided with intensity adjusted throughout treatment to patient tolerance. to reduce pain       *  Cold Pack Therapy in order to provide analgesia and reduce inflammation and edema. *  Hot Pack Therapy in order to relieve muscle spasm. Date: 8/29/17 8/31/17  (visit 2) 9/05/17 (visit 3) 9/08/17 (visit 4) 9/11/17  (visit 5)   Modalities:  8 min 8 min 8 min    %   1mhx 1.5 intesnity 1 Mhz, 1.1 w/cm2 1 Mhz, 1.2 w/cm2                    Therapeutic Exercise: 10 min 15 min 15 min 20 min 20 min   Ankle TB 4 dir and first digit DF RTB 30x each  x20 each red 3x10 each blue    PF stretch 3x30 sec hold 3x30 sec hold      PF MFR with lacrosse ball 3x1'       4 way SLR    x10 each direction, BLE    Seated HR/TR  30x x30 each x30 each Seated HR on knee extension 20# 30x   Ankle circles with board   x30 clockwise and counterclockwise  baps board L3 15 ea dir   Towel curls    x50     Towel in/ev  3' total  x20 each 1/2 tandem balance on 1/2 foam 2x30 sec B   SLB iso arch  L 30\" 30 sec hold x 2 Left  SLB iso arch with UE rows and HABD RTB 2x15 each   Proprioceptive Activities:                                Manual Therapy: 15 mins 15 min 20 min 15 min 25 mins   MFR PF, post tib, intrinsic flexors, L MTP distraction L ankle FDN/MFR abductor and flexor hallicis FDN/Dry needling to abductor and flexor hallicis  1st MTP joint distraction, peroneaol sesmoid distal/prox mobs, STJ distraction   STM/IASTM   To posterior tibilis and plantar surface of foot STM to intrinsic foot muscles, gastroc, and post tib FDN/MFR lateal gastroc and adductor hallucis   Therapeutic Activities:                                        HEP: Provided HEP on 8/29/17  Treatment/Session Assessment: She reports some pain relief after FDN to adductor hallucis and myofascial decompression taping. She tolerated increased exercise prescription of SLB without c/o of pain. Continue POC.     · Pain/ Symptoms: Initial:   4/10 L foot and ankle    Pt states \"I am not wearing the boot. I am walking on campus with my running shoes. My pain is not much better overall. I felt short term relief with dry needling. Post Session:  1/10 No increased pain ·   Compliance with Program/Exercises: Will assess as treatment progresses. · Recommendations/Intent for next treatment session: \"Next visit will focus on advancements to more challenging activities\".   Total Treatment Duration:  PT Patient Time In/Time Out  Time In: 1400  Time Out: [address] [address]

## 2017-09-12 ENCOUNTER — HOSPITAL ENCOUNTER (OUTPATIENT)
Dept: PHYSICAL THERAPY | Age: 22
Discharge: HOME | End: 2017-09-12
Payer: COMMERCIAL

## 2017-09-12 PROCEDURE — 97110 THERAPEUTIC EXERCISES: CPT

## 2017-09-12 PROCEDURE — 97140 MANUAL THERAPY 1/> REGIONS: CPT

## 2017-09-13 NOTE — PROGRESS NOTES
REHABILITATION [facility]  : 1995 [facility] [address]  [facility], [address] [place] [address]  [phone]   [phone]        OUTPATIENT PHYSICAL THERAPY:Daily Note 2017        ICD-10: Treatment Diagnosis: Pain in left foot (M79.672)Muscle weakness (generalized) (M62.81)  Precautions/Allergies:   Review of patient's allergies indicates not on file. Fall Risk Score: 0 (? 5 = High Risk)  MD Orders: Evaluation and treatment  MEDICAL/REFERRING DIAGNOSIS:  Pain in unspecified foot [M79.673]  DATE OF ONSET: 2017  REFERRING PHYSICIAN: Brittani Reza MD  RETURN PHYSICIAN APPOINTMENT: 17     INITIAL ASSESSMENT:  Ms. Lore Fu presents with decreased strength in left ankle and myofascial restriction of intrinsic foot flexors. Her impairments limit her ability to ambulate and participation in [employer]. Recommend PT services to remediate her impairments. PROBLEM LIST (Impacting functional limitations):  1. Decreased Strength  2. Decreased ADL/Functional Activities  3. Decreased Ambulation Ability/Technique  4. Decreased Balance  5. Increased Pain  6. Decreased Flexibility/Joint Mobility INTERVENTIONS PLANNED:  1. Balance Exercise  2. Cold  3. Cryotherapy  4. Electrical Stimulation  5. Gait Training  6. Heat  7. Home Exercise Program (HEP)  8. Manual Therapy  9. Range of Motion (ROM)  10. Therapeutic Activites  11. Therapeutic Exercise/Strengthening  12. Ultrasound (US)   TREATMENT PLAN:  Effective Dates: 17 TO 17. Frequency/Duration: 2 times a week for 12 weeks  GOALS: (Goals have been discussed and agreed upon with patient.)  Short-Term Functional Goals: Time Frame: 2 weeks  1. Patient will be independent with HEP without pain. 2. Patient will be able to ambulate without immobilizer boot without increased pain. Discharge Goals: Time Frame: 12 weeks  1.  Patient will have improve LEFS to> 70/80 points allowing her to return to full activity. 2. Patient will have improved ankle mmt to 5-/5 allowing her to ascend/descend stairs without pain. 3. Patient will have improve function allowing her to run 1 mile without increased pain. Rehabilitation Potential For Stated Goals: Good                HISTORY:   History of Present Injury/Illness (Reason for Referral):  22 y/o F with c/o of left plantar foot pain at her 1st MTP joint since June 2017. She has been wearing boot to walk and she does not have pain walking with the boot. She has 3/10 pain if she walks without the boot recently. She has recently saw a local orthopedic surgeon who diagnosed sesamoiditis and she saw her PT in [facility] who diagnosed sesamoiditis. She has a history of left ankle distal fibular fracture and ankle sprain that she has recovered from. She is on the [employer] team but is out of participation now due to her foot pain. She will be wearing the boot for 2 more weeks until she has f/u with ortho. Past Medical History/Comorbidities:   Ms. Gael Raymundo  has no past medical history on file. Ms. Geal Raymundo  has no past surgical history on file. Social History/Living Environment:    Lives in apartment  Prior Level of Function/Work/Activity:  Inpdependent  Previous Treatment Approaches:          none    Current Medications:  No current outpatient prescriptions on file.    Date Last Reviewed:  9/14/2017   EXAMINATION:   Observation/Orthostatic Postural Assessment:          Wearing left ankle immobilizer   Palpation:          Increased pain to left plantar MTP joint, left post tibialis  ROM:     WNL      Strength:     mmt   ankle DF L 4+ R 5-  Ankle PF L 3+ R 5-  Ankle IN L 3+ R 5-  Ankle EV : 3+ R 5-  1st digit extension L 3+ pain R 5-  1st digit flexion L 3+ pain R 5-      Special Tests:          Windlass painful L ankle  Neurological Screen:        Sensation: light touch intact  Functional Mobility:         Gait/Ambulation:  Without boot, left foot toe-in gait pattern with stance, decreased toe push off left LE  Balance:          SLB WNL without pain    Body Structures Involved:  1. Joints  2. Muscles Body Functions Affected:  1. Neuromusculoskeletal  2. Movement Related Activities and Participation Affected:  1. General Tasks and Demands  2. Mobility  3. Domestic Life  4. Community, Social and Civic Life   CLINICAL PRESENTATION:   CLINICAL DECISION MAKING:   Outcome Measure: Tool Used: Lower Extremity Functional Scale (LEFS)  Score:  Initial: 42/80 Most Recent: X/80 (Date: -- )   Interpretation of Score: 20 questions each scored on a 5 point scale with 0 representing \"extreme difficulty or unable to perform\" and 4 representing \"no difficulty\". The lower the score, the greater the functional disability. 80/80 represents no disability. Minimal detectable change is 9 points. Score 80 79-63 62-48 47-32 31-16 15-1 0   Modifier CH CI CJ CK CL CM CN     Medical Necessity:   · Patient is expected to demonstrate progress in strength, range of motion and functional technique to increase independence with gait. Reason for Services/Other Comments:  · Patient has been observed to have decreased strength before, during or after an intervention. TREATMENT:   (In addition to Assessment/Re-Assessment sessions the following treatments were rendered)  THERAPEUTIC EXERCISE: (see grid for minutes):  Exercises per grid below to improve mobility, strength and balance. Required moderate visual, verbal, manual and tactile cues to promote proper body alignment, promote proper body posture and promote proper body mechanics. Progressed resistance, range and repetitions as indicated. MANUAL THERAPY: (see grid for minutes): Joint mobilization and Soft tissue mobilization was utilized and necessary because of the patient's restricted joint motion, painful spasm, loss of articular motion and restricted motion of soft tissue. MODALITIES: (see grid for minutes):       *  Electrical Stimulation Therapy (IFC) was provided with intensity adjusted throughout treatment to patient tolerance. to reduce pain       *  Cold Pack Therapy in order to provide analgesia and reduce inflammation and edema. *  Hot Pack Therapy in order to relieve muscle spasm.      Date: 8/29/17 8/31/17  (visit 2) 9/05/17 (visit 3) 9/08/17 (visit 4) 9/11/17  (visit 5) 9/13/17  (visit 6)   Modalities:  8 min 8 min 8 min     %   1mhx 1.5 intesnity 1 Mhz, 1.1 w/cm2 1 Mhz, 1.2 w/cm2                       Therapeutic Exercise: 10 min 15 min 15 min 20 min 20 min 25 mins   Ankle TB 4 dir and first digit DF RTB 30x each  x20 each red 3x10 each blue     PF stretch 3x30 sec hold 3x30 sec hold    STS with RTB hip ABD 15x   PF MFR with lacrosse ball 3x1'     SL clams with RTB 30x   4 way SLR    x10 each direction, BLE     Seated HR/TR  30x x30 each x30 each Seated HR on knee extension 20# 30x 20# 30x   Ankle circles with board   x30 clockwise and counterclockwise  baps board L3 15 ea dir    Towel curls    x50   SLB hip abd into wall 10\"x5 B   Towel in/ev  3' total  x20 each 1/2 tandem balance on 1/2 foam 2x30 sec B    SLB iso arch  L 30\" 30 sec hold x 2 Left  SLB iso arch with UE rows and HABD RTB 2x15 each 3x10 B   Proprioceptive Activities:                                    Manual Therapy: 15 mins 15 min 20 min 15 min 25 mins 20 mins   MFR PF, post tib, intrinsic flexors, L MTP distraction L ankle FDN/MFR abductor and flexor hallicis FDN/Dry needling to abductor and flexor hallicis  1st MTP joint distraction, peroneaol sesmoid distal/prox mobs, STJ distraction repeat   STM/IASTM   To posterior tibilis and plantar surface of foot STM to intrinsic foot muscles, gastroc, and post tib FDN/MFR lateal gastroc and adductor hallucis MFR lateal gastroc and adductor hallucis   Therapeutic Activities:                                             HEP: Provided HEP on 8/29/17  Treatment/Session Assessment: She had significant difficult with body mechanics with knee valgus. She had difficulty making correction. Continue POC. · Pain/ Symptoms: Initial:   3/10 L foot and ankle    Pt states \"My pain is a little better. \" Post Session:  1/10 No increased pain ·   Compliance with Program/Exercises: Will assess as treatment progresses. · Recommendations/Intent for next treatment session: \"Next visit will focus on advancements to more challenging activities\".   Total Treatment Duration:  PT Patient Time In/Time Out  Time In: 1151  Time Out: 2000 [facility]

## 2017-09-14 ENCOUNTER — HOSPITAL ENCOUNTER (OUTPATIENT)
Dept: PHYSICAL THERAPY | Age: 22
Discharge: HOME | End: 2017-09-14
Payer: COMMERCIAL

## 2017-09-14 PROCEDURE — 97110 THERAPEUTIC EXERCISES: CPT

## 2017-09-14 PROCEDURE — 97140 MANUAL THERAPY 1/> REGIONS: CPT

## 2017-09-20 ENCOUNTER — HOSPITAL ENCOUNTER (OUTPATIENT)
Dept: PHYSICAL THERAPY | Age: 22
Discharge: HOME | End: 2017-09-20
Payer: COMMERCIAL

## 2017-09-20 PROCEDURE — 97140 MANUAL THERAPY 1/> REGIONS: CPT

## 2017-09-20 PROCEDURE — 97035 APP MDLTY 1+ULTRASOUND EA 15: CPT

## 2017-09-20 PROCEDURE — 97110 THERAPEUTIC EXERCISES: CPT

## 2017-09-20 NOTE — PROGRESS NOTES
REHABILITATION [facility]  : 1995 [facility] [address]  [address] [facility] [place].  [phone]   [phone]        OUTPATIENT PHYSICAL THERAPY:Daily Note 2017        ICD-10: Treatment Diagnosis: Pain in left foot (M79.672)Muscle weakness (generalized) (M62.81)  Precautions/Allergies:   Review of patient's allergies indicates not on file. Fall Risk Score: 0 (? 5 = High Risk)  MD Orders: Evaluation and treatment  MEDICAL/REFERRING DIAGNOSIS:  Pain in unspecified foot [M79.673]  DATE OF ONSET: 2017  REFERRING PHYSICIAN: Katia Dela Cruz MD  RETURN PHYSICIAN APPOINTMENT: 17     INITIAL ASSESSMENT:  Ms. Benjy Arevalo presents with decreased strength in left ankle and myofascial restriction of intrinsic foot flexors. Her impairments limit her ability to ambulate and participation in [employer]. Recommend PT services to remediate her impairments. PROBLEM LIST (Impacting functional limitations):  1. Decreased Strength  2. Decreased ADL/Functional Activities  3. Decreased Ambulation Ability/Technique  4. Decreased Balance  5. Increased Pain  6. Decreased Flexibility/Joint Mobility INTERVENTIONS PLANNED:  1. Balance Exercise  2. Cold  3. Cryotherapy  4. Electrical Stimulation  5. Gait Training  6. Heat  7. Home Exercise Program (HEP)  8. Manual Therapy  9. Range of Motion (ROM)  10. Therapeutic Activites  11. Therapeutic Exercise/Strengthening  12. Ultrasound (US)   TREATMENT PLAN:  Effective Dates: 17 TO 17. Frequency/Duration: 2 times a week for 12 weeks  GOALS: (Goals have been discussed and agreed upon with patient.)  Short-Term Functional Goals: Time Frame: 2 weeks  1. Patient will be independent with HEP without pain. 2. Patient will be able to ambulate without immobilizer boot without increased pain. Discharge Goals: Time Frame: 12 weeks  1.  Patient will have improve LEFS to> 70/80 points allowing her to return to full activity. 2. Patient will have improved ankle mmt to 5-/5 allowing her to ascend/descend stairs without pain. 3. Patient will have improve function allowing her to run 1 mile without increased pain. Rehabilitation Potential For Stated Goals: Good                HISTORY:   History of Present Injury/Illness (Reason for Referral):  24 y/o F with c/o of left plantar foot pain at her 1st MTP joint since June 2017. She has been wearing boot to walk and she does not have pain walking with the boot. She has 3/10 pain if she walks without the boot recently. She has recently saw a local orthopedic surgeon who diagnosed sesamoiditis and she saw her PT in [facility] who diagnosed sesamoiditis. She has a history of left ankle distal fibular fracture and ankle sprain that she has recovered from. She is on the [employer] team but is out of participation now due to her foot pain. She will be wearing the boot for 2 more weeks until she has f/u with ortho. Past Medical History/Comorbidities:   Ms. Reta Johnson  has no past medical history on file. Ms. Reta Johnson  has no past surgical history on file. Social History/Living Environment:    Lives in apartment  Prior Level of Function/Work/Activity:  Inpdependent  Previous Treatment Approaches:          none    Current Medications:  No current outpatient prescriptions on file.    Date Last Reviewed:  9/20/2017   EXAMINATION:   Observation/Orthostatic Postural Assessment:          Wearing left ankle immobilizer   Palpation:          Increased pain to left plantar MTP joint, left post tibialis  ROM:     WNL      Strength:     mmt   ankle DF L 4+ R 5-  Ankle PF L 3+ R 5-  Ankle IN L 3+ R 5-  Ankle EV : 3+ R 5-  1st digit extension L 3+ pain R 5-  1st digit flexion L 3+ pain R 5-      Special Tests:          Windlass painful L ankle  Neurological Screen:        Sensation: light touch intact  Functional Mobility:         Gait/Ambulation:  Without boot, left foot toe-in gait pattern with stance, decreased toe push off left LE  Balance:          SLB WNL without pain    Body Structures Involved:  1. Joints  2. Muscles Body Functions Affected:  1. Neuromusculoskeletal  2. Movement Related Activities and Participation Affected:  1. General Tasks and Demands  2. Mobility  3. Domestic Life  4. Community, Social and Civic Life   CLINICAL PRESENTATION:   CLINICAL DECISION MAKING:   Outcome Measure: Tool Used: Lower Extremity Functional Scale (LEFS)  Score:  Initial: 42/80 Most Recent: X/80 (Date: -- )   Interpretation of Score: 20 questions each scored on a 5 point scale with 0 representing \"extreme difficulty or unable to perform\" and 4 representing \"no difficulty\". The lower the score, the greater the functional disability. 80/80 represents no disability. Minimal detectable change is 9 points. Score 80 79-63 62-48 47-32 31-16 15-1 0   Modifier CH CI CJ CK CL CM CN     Medical Necessity:   · Patient is expected to demonstrate progress in strength, range of motion and functional technique to increase independence with gait. Reason for Services/Other Comments:  · Patient has been observed to have decreased strength before, during or after an intervention. TREATMENT:   (In addition to Assessment/Re-Assessment sessions the following treatments were rendered)  THERAPEUTIC EXERCISE: (see grid for minutes):  Exercises per grid below to improve mobility, strength and balance. Required moderate visual, verbal, manual and tactile cues to promote proper body alignment, promote proper body posture and promote proper body mechanics. Progressed resistance, range and repetitions as indicated. MANUAL THERAPY: (see grid for minutes): Joint mobilization and Soft tissue mobilization was utilized and necessary because of the patient's restricted joint motion, painful spasm, loss of articular motion and restricted motion of soft tissue. MODALITIES: (see grid for minutes):       *  Electrical Stimulation Therapy (IFC) was provided with intensity adjusted throughout treatment to patient tolerance. to reduce pain       *  Cold Pack Therapy in order to provide analgesia and reduce inflammation and edema. *  Hot Pack Therapy in order to relieve muscle spasm.      Date: 8/29/17 8/31/17  (visit 2) 9/05/17 (visit 3) 9/08/17 (visit 4) 9/11/17  (visit 5) 9/13/17  (visit 6) 9/20/17 (visit 7)   Modalities:  8 min 8 min 8 min   8 min   %   1mhx 1.5 intesnity 1 Mhz, 1.1 w/cm2 1 Mhz, 1.2 w/cm2   Continuous, 1 mhz, 1.2 w/cm2                       Therapeutic Exercise: 10 min 15 min 15 min 20 min 20 min 25 mins 20 min   Ankle TB 4 dir and first digit DF RTB 30x each  x20 each red 3x10 each blue      PF stretch 3x30 sec hold 3x30 sec hold    STS with RTB hip ABD 15x 1 min gastroc stretch   PF MFR with lacrosse ball 3x1'     SL clams with RTB 30x Lateral walk with knee flexed 2 laps   4 way SLR    x10 each direction, BLE      Seated HR/TR  30x x30 each x30 each Seated HR on knee extension 20# 30x 20# 30x x30 heel raises on incline   Ankle circles with board   x30 clockwise and counterclockwise  baps board L3 15 ea dir     Towel curls    x50   SLB hip abd into wall 10\"x5 B 30 sec hold x 4 SLB hip abduction into wall   Towel in/ev  3' total  x20 each 1/2 tandem balance on 1/2 foam 2x30 sec B     SLB iso arch  L 30\" 30 sec hold x 2 Left  SLB iso arch with UE rows and HABD RTB 2x15 each 3x10 B    Proprioceptive Activities:                                        Manual Therapy: 15 mins 15 min 20 min 15 min 25 mins 20 mins 15 min   MFR PF, post tib, intrinsic flexors, L MTP distraction L ankle FDN/MFR abductor and flexor hallicis FDN/Dry needling to abductor and flexor hallicis  1st MTP joint distraction, peroneaol sesmoid distal/prox mobs, STJ distraction repeat    STM/IASTM   To posterior tibilis and plantar surface of foot STM to intrinsic foot muscles, gastroc, and post tib FDN/MFR lateal gastroc and adductor hallucis MFR lateal gastroc and adductor hallucis To gastroc and adductor hallucis   Therapeutic Activities:          Step ups (forward) with lateral resistance       x15 BLE onto 10 inch with red band above knee                                 HEP: Provided HEP on 8/29/17  Treatment/Session Assessment: Pt reported mild pain during heel raises in the first metatarsal head. Pt can correct knee valgus with verbal cues. Continue POC. · Pain/ Symptoms: Initial:   3-4/10 L foot and ankle    Pt states \"I'm having pain in the ball of the big toe when I walk. \" Post Session: no increased pain ·   Compliance with Program/Exercises: Will assess as treatment progresses. · Recommendations/Intent for next treatment session: \"Next visit will focus on advancements to more challenging activities\".   Total Treatment Duration:  PT Patient Time In/Time Out  Time In: 0930  Time Out: 37[address] [facility]

## 2017-09-22 ENCOUNTER — HOSPITAL ENCOUNTER (OUTPATIENT)
Dept: PHYSICAL THERAPY | Age: 22
Discharge: HOME | End: 2017-09-22
Payer: COMMERCIAL

## 2017-09-22 PROCEDURE — 97140 MANUAL THERAPY 1/> REGIONS: CPT

## 2017-09-22 PROCEDURE — 97110 THERAPEUTIC EXERCISES: CPT

## 2017-09-22 NOTE — PROGRESS NOTES
[facility]  : [address] [facility] [address]  [address] [facility] [place].  [phone]   [phone]        OUTPATIENT PHYSICAL THERAPY:Daily Note and Progress Report 2017        ICD-10: Treatment Diagnosis: Pain in left foot (M79.672)Muscle weakness (generalized) (M62.81)  Precautions/Allergies:   Review of patient's allergies indicates not on file. Fall Risk Score: 0 (? 5 = High Risk)  MD Orders: Evaluation and treatment  MEDICAL/REFERRING DIAGNOSIS:  Pain in unspecified foot [M79.673]  DATE OF ONSET: 2017  REFERRING PHYSICIAN: Dana Bolden MD  RETURN PHYSICIAN APPOINTMENT: 17     INITIAL ASSESSMENT:  Ms. Raquel Jerome presents with decreased strength in left ankle and myofascial restriction of intrinsic foot flexors. Her impairments limit her ability to ambulate and participation in [employer]. Recommend PT services to remediate her impairments. PROBLEM LIST (Impacting functional limitations):  1. Decreased Strength  2. Decreased ADL/Functional Activities  3. Decreased Ambulation Ability/Technique  4. Decreased Balance  5. Increased Pain  6. Decreased Flexibility/Joint Mobility INTERVENTIONS PLANNED:  1. Balance Exercise  2. Cold  3. Cryotherapy  4. Electrical Stimulation  5. Gait Training  6. Heat  7. Home Exercise Program (HEP)  8. Manual Therapy  9. Range of Motion (ROM)  10. Therapeutic Activites  11. Therapeutic Exercise/Strengthening  12. Ultrasound (US)   TREATMENT PLAN:  Effective Dates: 17 TO 17. Frequency/Duration: 2 times a week for 12 weeks  GOALS: (Goals have been discussed and agreed upon with patient.)  Short-Term Functional Goals: Time Frame: 2 weeks  1. Patient will be independent with HEP without pain. 2. Patient will be able to ambulate without immobilizer boot without increased pain. Discharge Goals: Time Frame: 12 weeks  1.  Patient will have improve LEFS to> 70/80 points allowing her to return to full activity. 2. Patient will have improved ankle mmt to 5-/5 allowing her to ascend/descend stairs without pain. 3. Patient will have improve function allowing her to run 1 mile without increased pain. Rehabilitation Potential For Stated Goals: Good                HISTORY:   History of Present Injury/Illness (Reason for Referral):  22 y/o F with c/o of left plantar foot pain at her 1st MTP joint since June 2017. She has been wearing boot to walk and she does not have pain walking with the boot. She has 3/10 pain if she walks without the boot recently. She has recently saw a local orthopedic surgeon who diagnosed sesamoiditis and she saw her PT in [facility] who diagnosed sesamoiditis. She has a history of left ankle distal fibular fracture and ankle sprain that she has recovered from. She is on the [employer] team but is out of participation now due to her foot pain. She will be wearing the boot for 2 more weeks until she has f/u with ortho. Past Medical History/Comorbidities:   Ms. Reta Johnson  has no past medical history on file. Ms. Reta Johnson  has no past surgical history on file. Social History/Living Environment:    Lives in apartment  Prior Level of Function/Work/Activity:  Inpdependent  Previous Treatment Approaches:          none    Current Medications:  No current outpatient prescriptions on file.    Date Last Reviewed:  9/22/2017   EXAMINATION:   Observation/Orthostatic Postural Assessment:          Wearing left ankle immobilizer   Palpation:          Increased pain to left plantar MTP joint, left post tibialis  ROM:     WNL      Strength:     mmt   ankle DF L 4+ R 5-  Ankle PF L 3+ R 5-  Ankle IN L 3+ R 5-  Ankle EV : 3+ R 5-  1st digit extension L 3+ pain R 5-  1st digit flexion L 3+ pain R 5-      Special Tests:          Windlass painful L ankle  Neurological Screen:        Sensation: light touch intact  Functional Mobility:         Gait/Ambulation:  Without boot, left foot toe-in gait pattern with stance, decreased toe push off left LE  Balance:          SLB WNL without pain    Body Structures Involved:  1. Joints  2. Muscles Body Functions Affected:  1. Neuromusculoskeletal  2. Movement Related Activities and Participation Affected:  1. General Tasks and Demands  2. Mobility  3. Domestic Life  4. Community, Social and Civic Life   CLINICAL PRESENTATION:   CLINICAL DECISION MAKING:   Outcome Measure: Tool Used: Lower Extremity Functional Scale (LEFS)  Score:  Initial: 42/80 Most Recent: X/80 (Date: -- )   Interpretation of Score: 20 questions each scored on a 5 point scale with 0 representing \"extreme difficulty or unable to perform\" and 4 representing \"no difficulty\". The lower the score, the greater the functional disability. 80/80 represents no disability. Minimal detectable change is 9 points. Score 80 79-63 62-48 47-32 31-16 15-1 0   Modifier CH CI CJ CK CL CM CN     Medical Necessity:   · Patient is expected to demonstrate progress in strength, range of motion and functional technique to increase independence with gait. Reason for Services/Other Comments:  · Patient has been observed to have decreased strength before, during or after an intervention. TREATMENT:   (In addition to Assessment/Re-Assessment sessions the following treatments were rendered)  THERAPEUTIC EXERCISE: (see grid for minutes):  Exercises per grid below to improve mobility, strength and balance. Required moderate visual, verbal, manual and tactile cues to promote proper body alignment, promote proper body posture and promote proper body mechanics. Progressed resistance, range and repetitions as indicated. MANUAL THERAPY: (see grid for minutes): Joint mobilization and Soft tissue mobilization was utilized and necessary because of the patient's restricted joint motion, painful spasm, loss of articular motion and restricted motion of soft tissue. MODALITIES: (see grid for minutes):       *  Electrical Stimulation Therapy (IFC) was provided with intensity adjusted throughout treatment to patient tolerance. to reduce pain       *  Cold Pack Therapy in order to provide analgesia and reduce inflammation and edema. *  Hot Pack Therapy in order to relieve muscle spasm.      Date: 8/29/17 8/31/17  (visit 2) 9/05/17 (visit 3) 9/08/17 (visit 4) 9/11/17  (visit 5) 9/13/17  (visit 6) 9/20/17 (visit 7) 9/22/17  (visit 8)   Modalities:  8 min 8 min 8 min   8 min    %   1mhx 1.5 intesnity 1 Mhz, 1.1 w/cm2 1 Mhz, 1.2 w/cm2   Continuous, 1 mhz, 1.2 w/cm2                          Therapeutic Exercise: 10 min 15 min 15 min 20 min 20 min 25 mins 20 min 30 mins   Ankle TB 4 dir and first digit DF RTB 30x each  x20 each red 3x10 each blue    SL squat 3x10 B LE   PF stretch 3x30 sec hold 3x30 sec hold    STS with RTB hip ABD 15x 1 min gastroc stretch 1 min slant board and 1 min PF stretch on 1/2 foam   PF MFR with lacrosse ball 3x1'     SL clams with RTB 30x Lateral walk with knee flexed 2 laps 3 laps in squat position 60# on ankles   4 way SLR    x10 each direction, BLE       Seated HR/TR  30x x30 each x30 each Seated HR on knee extension 20# 30x 20# 30x x30 heel raises on incline 30x    Ankle circles with board   x30 clockwise and counterclockwise  baps board L3 15 ea dir   Heel raises seated with 25# resistance on knee extension machine   Towel curls    x50   SLB hip abd into wall 10\"x5 B 30 sec hold x 4 SLB hip abduction into wall    Towel in/ev  3' total  x20 each 1/2 tandem balance on 1/2 foam 2x30 sec B   REtro gait on TM 5' incline 8% 1.5 mph   SLB iso arch  L 30\" 30 sec hold x 2 Left  SLB iso arch with UE rows and HABD RTB 2x15 each 3x10 B     Proprioceptive Activities:                                            Manual Therapy: 15 mins 15 min 20 min 15 min 25 mins 20 mins 15 min 15 mins   MFR PF, post tib, intrinsic flexors, L MTP distraction L ankle FDN/MFR abductor and flexor hallicis FDN/Dry needling to abductor and flexor hallicis  1st MTP joint distraction, peroneaol sesmoid distal/prox mobs, STJ distraction repeat  repeat   STM/IASTM   To posterior tibilis and plantar surface of foot STM to intrinsic foot muscles, gastroc, and post tib FDN/MFR lateal gastroc and adductor hallucis MFR lateal gastroc and adductor hallucis To gastroc and adductor hallucis MFR/FDN add ferrara, post tib, flex ferrara   Therapeutic Activities:           Step ups (forward) with lateral resistance       x15 BLE onto 10 inch with red band above knee                                     HEP: Provided HEP on 8/29/17  Treatment/Session Assessment: She is making minimal progress in symptoms. She has improve body mechanics of B LE. She was issued a MT pad today to off-load the 1st MT head. She had report of decreased pain with met Continue POC. · Pain/ Symptoms: Initial:   3-4/10 L foot and ankle    Pt states \"It still hurts about the same as last visit. \" Post Session: no increased pain ·   Compliance with Program/Exercises: Will assess as treatment progresses. · Recommendations/Intent for next treatment session: \"Next visit will focus on advancements to more challenging activities\".   Total Treatment Duration:  PT Patient Time In/Time Out  Time In: 1135  Time Out: MATIAS/ Dawson Morales 19

## 2017-09-25 NOTE — PROGRESS NOTES
[facility]  : [address] [facility] [address], [place] [address].  [phone]   [phone]        OUTPATIENT PHYSICAL THERAPY:Daily Note 2017        ICD-10: Treatment Diagnosis: Pain in left foot (M79.672)Muscle weakness (generalized) (M62.81)  Precautions/Allergies:   Review of patient's allergies indicates not on file. Fall Risk Score: 0 (? 5 = High Risk)  MD Orders: Evaluation and treatment  MEDICAL/REFERRING DIAGNOSIS:  Pain in unspecified foot [M79.673]  DATE OF ONSET: 2017  REFERRING PHYSICIAN: Syed Power MD  RETURN PHYSICIAN APPOINTMENT: 17     INITIAL ASSESSMENT:  Ms. Chelsey Diaz presents with decreased strength in left ankle and myofascial restriction of intrinsic foot flexors. Her impairments limit her ability to ambulate and participation in [employer]. Recommend PT services to remediate her impairments. PROBLEM LIST (Impacting functional limitations):  1. Decreased Strength  2. Decreased ADL/Functional Activities  3. Decreased Ambulation Ability/Technique  4. Decreased Balance  5. Increased Pain  6. Decreased Flexibility/Joint Mobility INTERVENTIONS PLANNED:  1. Balance Exercise  2. Cold  3. Cryotherapy  4. Electrical Stimulation  5. Gait Training  6. Heat  7. Home Exercise Program (HEP)  8. Manual Therapy  9. Range of Motion (ROM)  10. Therapeutic Activites  11. Therapeutic Exercise/Strengthening  12. Ultrasound (US)   TREATMENT PLAN:  Effective Dates: 17 TO 17. Frequency/Duration: 2 times a week for 12 weeks  GOALS: (Goals have been discussed and agreed upon with patient.)  Short-Term Functional Goals: Time Frame: 2 weeks  1. Patient will be independent with HEP without pain. 2. Patient will be able to ambulate without immobilizer boot without increased pain. Discharge Goals: Time Frame: 12 weeks  1.  Patient will have improve LEFS to> 70/80 points allowing her to return to full activity. 2. Patient will have improved ankle mmt to 5-/5 allowing her to ascend/descend stairs without pain. 3. Patient will have improve function allowing her to run 1 mile without increased pain. Rehabilitation Potential For Stated Goals: Good                HISTORY:   History of Present Injury/Illness (Reason for Referral):  22 y/o F with c/o of left plantar foot pain at her 1st MTP joint since June 2017. She has been wearing boot to walk and she does not have pain walking with the boot. She has 3/10 pain if she walks without the boot recently. She has recently saw a local orthopedic surgeon who diagnosed sesamoiditis and she saw her PT in [facility] who diagnosed sesamoiditis. She has a history of left ankle distal fibular fracture and ankle sprain that she has recovered from. She is on the [employer] team but is out of participation now due to her foot pain. She will be wearing the boot for 2 more weeks until she has f/u with ortho. Past Medical History/Comorbidities:   Ms. Estelita Rosenbaum  has no past medical history on file. Ms. Estelita Rosenbaum  has no past surgical history on file. Social History/Living Environment:    Lives in apartment  Prior Level of Function/Work/Activity:  Inpdependent  Previous Treatment Approaches:          none    Current Medications:  No current outpatient prescriptions on file.    Date Last Reviewed:  9/26/2017   EXAMINATION:   Observation/Orthostatic Postural Assessment:          Wearing left ankle immobilizer   Palpation:          Increased pain to left plantar MTP joint, left post tibialis  ROM:     WNL      Strength:     mmt   ankle DF L 4+ R 5-  Ankle PF L 3+ R 5-  Ankle IN L 3+ R 5-  Ankle EV : 3+ R 5-  1st digit extension L 3+ pain R 5-  1st digit flexion L 3+ pain R 5-   9/26/17  mmt 4+/5 B LE   Special Tests:          Windlass painful L ankle  Neurological Screen:        Sensation: light touch intact  Functional Mobility:         Gait/Ambulation:  Without boot, left foot toe-in gait pattern with stance, decreased toe push off left LE       9/26/17 SFMA squat: genu valgus B, SLB genu valgus navicular drop B  Balance:          SLB WNL without pain    Body Structures Involved:  1. Joints  2. Muscles Body Functions Affected:  1. Neuromusculoskeletal  2. Movement Related Activities and Participation Affected:  1. General Tasks and Demands  2. Mobility  3. Domestic Life  4. Community, Social and Civic Life   CLINICAL PRESENTATION:   CLINICAL DECISION MAKING:   Outcome Measure: Tool Used: Lower Extremity Functional Scale (LEFS)  Score:  Initial: 42/80 Most Recent: X/80 (Date: -- )   Interpretation of Score: 20 questions each scored on a 5 point scale with 0 representing \"extreme difficulty or unable to perform\" and 4 representing \"no difficulty\". The lower the score, the greater the functional disability. 80/80 represents no disability. Minimal detectable change is 9 points. Score 80 79-63 62-48 47-32 31-16 15-1 0   Modifier CH CI CJ CK CL CM CN     Medical Necessity:   · Patient is expected to demonstrate progress in strength, range of motion and functional technique to increase independence with gait. Reason for Services/Other Comments:  · Patient has been observed to have decreased strength before, during or after an intervention. TREATMENT:   (In addition to Assessment/Re-Assessment sessions the following treatments were rendered)  THERAPEUTIC EXERCISE: (see grid for minutes):  Exercises per grid below to improve mobility, strength and balance. Required moderate visual, verbal, manual and tactile cues to promote proper body alignment, promote proper body posture and promote proper body mechanics. Progressed resistance, range and repetitions as indicated.   MANUAL THERAPY: (see grid for minutes): Joint mobilization and Soft tissue mobilization was utilized and necessary because of the patient's restricted joint motion, painful spasm, loss of articular motion and restricted motion of soft tissue. MODALITIES: (see grid for minutes): *  Electrical Stimulation Therapy (IFC) was provided with intensity adjusted throughout treatment to patient tolerance. to reduce pain       *  Cold Pack Therapy in order to provide analgesia and reduce inflammation and edema. *  Hot Pack Therapy in order to relieve muscle spasm.      Date: 8/29/17 8/31/17  (visit 2) 9/05/17 (visit 3) 9/08/17 (visit 4) 9/11/17  (visit 5) 9/13/17  (visit 6) 9/20/17 (visit 7) 9/22/17  (visit 8) 9/26/17  (visit 9)   Modalities:  8 min 8 min 8 min   8 min  8 min   %   1mhx 1.5 intesnity 1 Mhz, 1.1 w/cm2 1 Mhz, 1.2 w/cm2   Continuous, 1 mhz, 1.2 w/cm2  repeat                           Therapeutic Exercise: 10 min 15 min 15 min 20 min 20 min 25 mins 20 min 30 mins 20 mins   Ankle TB 4 dir and first digit DF RTB 30x each  x20 each red 3x10 each blue    SL squat 3x10 B LE repeat   PF stretch 3x30 sec hold 3x30 sec hold    STS with RTB hip ABD 15x 1 min gastroc stretch 1 min slant board and 1 min PF stretch on 1/2 foam repeat   PF MFR with lacrosse ball 3x1'     SL clams with RTB 30x Lateral walk with knee flexed 2 laps 3 laps in squat position 60# on ankles repeat   4 way SLR    x10 each direction, BLE        Seated HR/TR  30x x30 each x30 each Seated HR on knee extension 20# 30x 20# 30x x30 heel raises on incline 30x  30x   Ankle circles with board   x30 clockwise and counterclockwise  baps board L3 15 ea dir   Heel raises seated with 25# resistance on knee extension machine repeat   Towel curls    x50   SLB hip abd into wall 10\"x5 B 30 sec hold x 4 SLB hip abduction into wall     Towel in/ev  3' total  x20 each 1/2 tandem balance on 1/2 foam 2x30 sec B   REtro gait on TM 5' incline 8% 1.5 mph repeat   SLB iso arch  L 30\" 30 sec hold x 2 Left  SLB iso arch with UE rows and HABD RTB 2x15 each 3x10 B      Proprioceptive Activities:                                                Manual Therapy: 15 mins 15 min 20 min 15 min 25 mins 20 mins 15 min 15 mins 15 mins   MFR PF, post tib, intrinsic flexors, L MTP distraction L ankle FDN/MFR abductor and flexor hallicis FDN/Dry needling to abductor and flexor hallicis  1st MTP joint distraction, peroneaol sesmoid distal/prox mobs, STJ distraction repeat  repeat repeat   STM/IASTM   To posterior tibilis and plantar surface of foot STM to intrinsic foot muscles, gastroc, and post tib FDN/MFR lateal gastroc and adductor hallucis MFR lateal gastroc and adductor hallucis To gastroc and adductor hallucis MFR/FDN add ferrara, post tib, flex ferrara repeat   Therapeutic Activities:            Step ups (forward) with lateral resistance       x15 BLE onto 10 inch with red band above knee                                         HEP: Provided HEP on 8/29/17  Treatment/Session Assessment: She is making steady progress. Continue POC. · Pain/ Symptoms: Initial:   2/10   \"My pain is slowly getting better. \" Post Session: no increased pain ·   Compliance with Program/Exercises: Will assess as treatment progresses. · Recommendations/Intent for next treatment session: \"Next visit will focus on advancements to more challenging activities\".   Total Treatment Duration:  PT Patient Time In/Time Out  Time In: 1400  Time Out: [id number] [facility] [address],

## 2017-09-26 ENCOUNTER — HOSPITAL ENCOUNTER (OUTPATIENT)
Dept: PHYSICAL THERAPY | Age: 22
Discharge: HOME | End: 2017-09-26
Payer: COMMERCIAL

## 2017-09-26 PROCEDURE — 97035 APP MDLTY 1+ULTRASOUND EA 15: CPT

## 2017-09-26 PROCEDURE — 97110 THERAPEUTIC EXERCISES: CPT

## 2017-09-26 PROCEDURE — 97140 MANUAL THERAPY 1/> REGIONS: CPT

## 2017-09-28 ENCOUNTER — APPOINTMENT (OUTPATIENT)
Dept: PHYSICAL THERAPY | Age: 22
End: 2017-09-28
Payer: COMMERCIAL

## 2017-09-29 ENCOUNTER — HOSPITAL ENCOUNTER (OUTPATIENT)
Dept: PHYSICAL THERAPY | Age: 22
Discharge: HOME | End: 2017-09-29
Payer: COMMERCIAL

## 2017-09-29 PROCEDURE — 97140 MANUAL THERAPY 1/> REGIONS: CPT

## 2017-09-29 PROCEDURE — 97110 THERAPEUTIC EXERCISES: CPT

## 2017-09-29 PROCEDURE — 97033 APP MDLTY 1+IONTPHRSIS EA 15: CPT

## 2017-09-29 NOTE — PROGRESS NOTES
[facility]  : 1995 [facility] [address]  [address] [facility] [place].  [phone]   [phone]        OUTPATIENT PHYSICAL THERAPY:Daily Note and Progress Report 2017        ICD-10: Treatment Diagnosis: Pain in left foot (M79.672)Muscle weakness (generalized) (M62.81)  Precautions/Allergies:   Review of patient's allergies indicates not on file. Fall Risk Score: 0 (? 5 = High Risk)  MD Orders: Evaluation and treatment  MEDICAL/REFERRING DIAGNOSIS:  Pain in unspecified foot [M79.673]  DATE OF ONSET: 2017  REFERRING PHYSICIAN: Osiris Glover MD  RETURN PHYSICIAN APPOINTMENT: 17     INITIAL ASSESSMENT:  Ms. Loan Puga presents with decreased strength in left ankle and myofascial restriction of intrinsic foot flexors. Her impairments limit her ability to ambulate and participation in [employer]. Recommend PT services to remediate her impairments. PROBLEM LIST (Impacting functional limitations):  1. Decreased Strength  2. Decreased ADL/Functional Activities  3. Decreased Ambulation Ability/Technique  4. Decreased Balance  5. Increased Pain  6. Decreased Flexibility/Joint Mobility INTERVENTIONS PLANNED:  1. Balance Exercise  2. Cold  3. Cryotherapy  4. Electrical Stimulation  5. Gait Training  6. Heat  7. Home Exercise Program (HEP)  8. Manual Therapy  9. Range of Motion (ROM)  10. Therapeutic Activites  11. Therapeutic Exercise/Strengthening  12. Ultrasound (US)   TREATMENT PLAN:  Effective Dates: 17 TO 17. Frequency/Duration: 2 times a week for 12 weeks  GOALS: (Goals have been discussed and agreed upon with patient.)  Short-Term Functional Goals: Time Frame: 2 weeks  1. Patient will be independent with HEP without pain.(MET)  2. Patient will be able to ambulate without immobilizer boot without increased pain. (MET)  Discharge Goals: Time Frame: 12 weeks  1.  Patient will have improve LEFS to> 70/80 points allowing her to return to full activity. (progressing)  2. Patient will have improved ankle mmt to 5-/5 allowing her to ascend/descend stairs without pain.(progressing)  3. Patient will have improve function allowing her to run 1 mile without increased pain. (progressing)  Rehabilitation Potential For Stated Goals: Good                HISTORY:   History of Present Injury/Illness (Reason for Referral):  24 y/o F with c/o of left plantar foot pain at her 1st MTP joint since June 2017. She has been wearing boot to walk and she does not have pain walking with the boot. She has 3/10 pain if she walks without the boot recently. She has recently saw a local orthopedic surgeon who diagnosed sesamoiditis and she saw her PT in [facility] who diagnosed sesamoiditis. She has a history of left ankle distal fibular fracture and ankle sprain that she has recovered from. She is on the [employer] team but is out of participation now due to her foot pain. She will be wearing the boot for 2 more weeks until she has f/u with ortho. Past Medical History/Comorbidities:   Ms. Edwina Anaya  has no past medical history on file. Ms. Edwina Anaya  has no past surgical history on file. Social History/Living Environment:    Lives in apartment  Prior Level of Function/Work/Activity:  Inpdependent  Previous Treatment Approaches:          none    Current Medications:  No current outpatient prescriptions on file.    Date Last Reviewed:  9/29/2017   EXAMINATION:   Observation/Orthostatic Postural Assessment:          Wearing left ankle immobilizer   Palpation:          Increased pain to left plantar MTP joint, left post tibialis  ROM:     WNL      Strength:     mmt   ankle DF L 4+ R 5-  Ankle PF L 3+ R 5-  Ankle IN L 3+ R 5-  Ankle EV : 3+ R 5-  1st digit extension L 3+ pain R 5-  1st digit flexion L 3+ pain R 5-   9/26/17  mmt 4+/5 B LE   Special Tests:          Windlass painful L ankle  Neurological Screen:        Sensation: light touch intact  Functional Mobility:         Gait/Ambulation:  Without boot, left foot toe-in gait pattern with stance, decreased toe push off left LE       9/26/17 SFMA squat: genu valgus B, SLB genu valgus navicular drop B  Balance:          SLB WNL without pain    Body Structures Involved:  1. Joints  2. Muscles Body Functions Affected:  1. Neuromusculoskeletal  2. Movement Related Activities and Participation Affected:  1. General Tasks and Demands  2. Mobility  3. Domestic Life  4. Community, Social and Civic Life   CLINICAL PRESENTATION:   CLINICAL DECISION MAKING:   Outcome Measure: Tool Used: Lower Extremity Functional Scale (LEFS)  Score:  Initial: 42/80 Most Recent: X/80 (Date: -- )   Interpretation of Score: 20 questions each scored on a 5 point scale with 0 representing \"extreme difficulty or unable to perform\" and 4 representing \"no difficulty\". The lower the score, the greater the functional disability. 80/80 represents no disability. Minimal detectable change is 9 points. Score 80 79-63 62-48 47-32 31-16 15-1 0   Modifier CH CI CJ CK CL CM CN     Medical Necessity:   · Patient is expected to demonstrate progress in strength, range of motion and functional technique to increase independence with gait. Reason for Services/Other Comments:  · Patient has been observed to have decreased strength before, during or after an intervention. TREATMENT:   (In addition to Assessment/Re-Assessment sessions the following treatments were rendered)  THERAPEUTIC EXERCISE: (see grid for minutes):  Exercises per grid below to improve mobility, strength and balance. Required moderate visual, verbal, manual and tactile cues to promote proper body alignment, promote proper body posture and promote proper body mechanics. Progressed resistance, range and repetitions as indicated.   MANUAL THERAPY: (see grid for minutes): Joint mobilization and Soft tissue mobilization was utilized and necessary because of the patient's restricted joint motion, painful spasm, loss of articular motion and restricted motion of soft tissue. MODALITIES: (see grid for minutes): *  Electrical Stimulation Therapy (IFC) was provided with intensity adjusted throughout treatment to patient tolerance. to reduce pain       *  Cold Pack Therapy in order to provide analgesia and reduce inflammation and edema. *  Hot Pack Therapy in order to relieve muscle spasm.      Date: 8/29/17 8/31/17  (visit 2) 9/05/17 (visit 3) 9/08/17 (visit 4) 9/11/17  (visit 5) 9/13/17  (visit 6) 9/20/17 (visit 7) 9/22/17  (visit 8) 9/26/17  (visit 9) 9/29/17  (visit 10)  PN   Modalities:  8 min 8 min 8 min   8 min  8 min 17 mins   %   1mhx 1.5 intesnity 1 Mhz, 1.1 w/cm2 1 Mhz, 1.2 w/cm2   Continuous, 1 mhz, 1.2 w/cm2  repeat ionto 40 mhz x 17 min left sesmoids                             Therapeutic Exercise: 10 min 15 min 15 min 20 min 20 min 25 mins 20 min 30 mins 20 mins 10 mins   Ankle TB 4 dir and first digit DF RTB 30x each  x20 each red 3x10 each blue    SL squat 3x10 B LE repeat    PF stretch 3x30 sec hold 3x30 sec hold    STS with RTB hip ABD 15x 1 min gastroc stretch 1 min slant board and 1 min PF stretch on 1/2 foam repeat    PF MFR with lacrosse ball 3x1'     SL clams with RTB 30x Lateral walk with knee flexed 2 laps 3 laps in squat position 60# on ankles repeat    4 way SLR    x10 each direction, BLE         Seated HR/TR  30x x30 each x30 each Seated HR on knee extension 20# 30x 20# 30x x30 heel raises on incline 30x  30x    Ankle circles with board   x30 clockwise and counterclockwise  baps board L3 15 ea dir   Heel raises seated with 25# resistance on knee extension machine repeat    Towel curls    x50   SLB hip abd into wall 10\"x5 B 30 sec hold x 4 SLB hip abduction into wall      Towel in/ev  3' total  x20 each 1/2 tandem balance on 1/2 foam 2x30 sec B   REtro gait on TM 5' incline 8% 1.5 mph repeat    SLB iso arch  L 30\" 30 sec hold x 2 Left  SLB iso arch with UE rows and HABD RTB 2x15 each 3x10 B    TB 1st digit DF/PF 20x RTB, ankle DF/PF 20x    Proprioceptive Activities:                                                    Manual Therapy: 15 mins 15 min 20 min 15 min 25 mins 20 mins 15 min 15 mins 15 mins 15 mins   MFR PF, post tib, intrinsic flexors, L MTP distraction L ankle FDN/MFR abductor and flexor hallicis FDN/Dry needling to abductor and flexor hallicis  1st MTP joint distraction, peroneaol sesmoid distal/prox mobs, STJ distraction repeat  repeat repeat repeat   STM/IASTM   To posterior tibilis and plantar surface of foot STM to intrinsic foot muscles, gastroc, and post tib FDN/MFR lateal gastroc and adductor hallucis MFR lateal gastroc and adductor hallucis To gastroc and adductor hallucis MFR/FDN add ferrara, post tib, flex ferrara repeat MFR Left PF   Therapeutic Activities:             Step ups (forward) with lateral resistance       x15 BLE onto 10 inch with red band above knee                                             HEP: Provided HEP on 8/29/17  Treatment/Session Assessment: She was educated on wearing supportive soft footwear to avoid flare ups. · Pain/ Symptoms: Initial:   2/10   \"I had severe pain and swelling 2 days agao after sitting and studying for a while. It has gotten better. \" Post Session: no increased pain ·   Compliance with Program/Exercises: Will assess as treatment progresses. · Recommendations/Intent for next treatment session: \"Next visit will focus on advancements to more challenging activities\".   Total Treatment Duration:  PT Patient Time In/Time Out  Time In: 1140  Time Out: [address] [facility],

## 2017-10-03 ENCOUNTER — HOSPITAL ENCOUNTER (OUTPATIENT)
Dept: PHYSICAL THERAPY | Age: 22
Discharge: HOME | End: 2017-10-03
Payer: COMMERCIAL

## 2017-10-03 PROCEDURE — 97140 MANUAL THERAPY 1/> REGIONS: CPT

## 2017-10-03 PROCEDURE — 97033 APP MDLTY 1+IONTPHRSIS EA 15: CPT

## 2017-10-03 PROCEDURE — 97110 THERAPEUTIC EXERCISES: CPT

## 2017-10-03 NOTE — PROGRESS NOTES
[facility]  : 1995 [facility] [address]  [address] [facility] [place].  [phone]   [phone]        OUTPATIENT PHYSICAL THERAPY:Daily Note 10/3/2017        ICD-10: Treatment Diagnosis: Pain in left foot (M79.672)Muscle weakness (generalized) (M62.81)  Precautions/Allergies:   Review of patient's allergies indicates not on file. Fall Risk Score: 0 (? 5 = High Risk)  MD Orders: Evaluation and treatment  MEDICAL/REFERRING DIAGNOSIS:  Pain in unspecified foot [M79.673]  DATE OF ONSET: 2017  REFERRING PHYSICIAN: Eve Thomas MD  RETURN PHYSICIAN APPOINTMENT: 17     INITIAL ASSESSMENT:  Ms. Richard Garcia presents with decreased strength in left ankle and myofascial restriction of intrinsic foot flexors. Her impairments limit her ability to ambulate and participation in [employer]. Recommend PT services to remediate her impairments. PROBLEM LIST (Impacting functional limitations):  1. Decreased Strength  2. Decreased ADL/Functional Activities  3. Decreased Ambulation Ability/Technique  4. Decreased Balance  5. Increased Pain  6. Decreased Flexibility/Joint Mobility INTERVENTIONS PLANNED:  1. Balance Exercise  2. Cold  3. Cryotherapy  4. Electrical Stimulation  5. Gait Training  6. Heat  7. Home Exercise Program (HEP)  8. Manual Therapy  9. Range of Motion (ROM)  10. Therapeutic Activites  11. Therapeutic Exercise/Strengthening  12. Ultrasound (US)   TREATMENT PLAN:  Effective Dates: 17 TO 17. Frequency/Duration: 2 times a week for 12 weeks  GOALS: (Goals have been discussed and agreed upon with patient.)  Short-Term Functional Goals: Time Frame: 2 weeks  1. Patient will be independent with HEP without pain.(MET)  2. Patient will be able to ambulate without immobilizer boot without increased pain. (MET)  Discharge Goals: Time Frame: 12 weeks  1.  Patient will have improve LEFS to> 70/80 points allowing her to return to full activity. (progressing)  2. Patient will have improved ankle mmt to 5-/5 allowing her to ascend/descend stairs without pain.(progressing)  3. Patient will have improve function allowing her to run 1 mile without increased pain. (progressing)  Rehabilitation Potential For Stated Goals: Good                HISTORY:   History of Present Injury/Illness (Reason for Referral):  24 y/o F with c/o of left plantar foot pain at her 1st MTP joint since June 2017. She has been wearing boot to walk and she does not have pain walking with the boot. She has 3/10 pain if she walks without the boot recently. She has recently saw a local orthopedic surgeon who diagnosed sesamoiditis and she saw her PT in [facility] who diagnosed sesamoiditis. She has a history of left ankle distal fibular fracture and ankle sprain that she has recovered from. She is on the [employer] team but is out of participation now due to her foot pain. She will be wearing the boot for 2 more weeks until she has f/u with ortho. Past Medical History/Comorbidities:   Ms. Juan Carlos Garcia  has no past medical history on file. Ms. Juan Carlos Garcia  has no past surgical history on file. Social History/Living Environment:    Lives in apartment  Prior Level of Function/Work/Activity:  Inpdependent  Previous Treatment Approaches:          none    Current Medications:  No current outpatient prescriptions on file.    Date Last Reviewed:  10/3/2017   EXAMINATION:   Observation/Orthostatic Postural Assessment:          Wearing left ankle immobilizer   Palpation:          Increased pain to left plantar MTP joint, left post tibialis  ROM:     WNL      Strength:     mmt   ankle DF L 4+ R 5-  Ankle PF L 3+ R 5-  Ankle IN L 3+ R 5-  Ankle EV : 3+ R 5-  1st digit extension L 3+ pain R 5-  1st digit flexion L 3+ pain R 5-   9/26/17  mmt 4+/5 B LE   Special Tests:          Windlass painful L ankle  Neurological Screen:        Sensation: light touch intact  Functional Mobility: Gait/Ambulation:  Without boot, left foot toe-in gait pattern with stance, decreased toe push off left LE       9/26/17 SFMA squat: genu valgus B, SLB genu valgus navicular drop B  Balance:          SLB WNL without pain    Body Structures Involved:  1. Joints  2. Muscles Body Functions Affected:  1. Neuromusculoskeletal  2. Movement Related Activities and Participation Affected:  1. General Tasks and Demands  2. Mobility  3. Domestic Life  4. Community, Social and Civic Life   CLINICAL PRESENTATION:   CLINICAL DECISION MAKING:   Outcome Measure: Tool Used: Lower Extremity Functional Scale (LEFS)  Score:  Initial: 42/80 Most Recent: X/80 (Date: -- )   Interpretation of Score: 20 questions each scored on a 5 point scale with 0 representing \"extreme difficulty or unable to perform\" and 4 representing \"no difficulty\". The lower the score, the greater the functional disability. 80/80 represents no disability. Minimal detectable change is 9 points. Score 80 79-63 62-48 47-32 31-16 15-1 0   Modifier CH CI CJ CK CL CM CN     Medical Necessity:   · Patient is expected to demonstrate progress in strength, range of motion and functional technique to increase independence with gait. Reason for Services/Other Comments:  · Patient has been observed to have decreased strength before, during or after an intervention. TREATMENT:   (In addition to Assessment/Re-Assessment sessions the following treatments were rendered)  THERAPEUTIC EXERCISE: (see grid for minutes):  Exercises per grid below to improve mobility, strength and balance. Required moderate visual, verbal, manual and tactile cues to promote proper body alignment, promote proper body posture and promote proper body mechanics. Progressed resistance, range and repetitions as indicated.   MANUAL THERAPY: (see grid for minutes): Joint mobilization and Soft tissue mobilization was utilized and necessary because of the patient's restricted joint motion, painful spasm, loss of articular motion and restricted motion of soft tissue. MODALITIES: (see grid for minutes): *  Electrical Stimulation Therapy (IFC) was provided with intensity adjusted throughout treatment to patient tolerance. to reduce pain       *  Cold Pack Therapy in order to provide analgesia and reduce inflammation and edema. *  Hot Pack Therapy in order to relieve muscle spasm.      Date: 8/29/17 8/31/17  (visit 2) 9/05/17 (visit 3) 9/08/17 (visit 4) 9/11/17  (visit 5) 9/13/17  (visit 6) 9/20/17 (visit 7) 9/22/17  (visit 8) 9/26/17  (visit 9) 9/29/17  (visit 10)  PN 10//3/17  (visit 11)   Modalities:  8 min 8 min 8 min   8 min  8 min 17 mins 12 min   %   1mhx 1.5 intesnity 1 Mhz, 1.1 w/cm2 1 Mhz, 1.2 w/cm2   Continuous, 1 mhz, 1.2 w/cm2  repeat ionto 40 mhz x 17 min left sesmoids 50 mhz x 12 min                               Therapeutic Exercise: 10 min 15 min 15 min 20 min 20 min 25 mins 20 min 30 mins 20 mins 10 mins 20 mins   Ankle TB 4 dir and first digit DF RTB 30x each  x20 each red 3x10 each blue    SL squat 3x10 B LE repeat  Repeat/ SL RDL 4# B 2x10 each B   PF stretch 3x30 sec hold 3x30 sec hold    STS with RTB hip ABD 15x 1 min gastroc stretch 1 min slant board and 1 min PF stretch on 1/2 foam repeat     PF MFR with lacrosse ball 3x1'     SL clams with RTB 30x Lateral walk with knee flexed 2 laps 3 laps in squat position 60# on ankles repeat     4 way SLR    x10 each direction, BLE          Seated HR/TR  30x x30 each x30 each Seated HR on knee extension 20# 30x 20# 30x x30 heel raises on incline 30x  30x  Towel crunches/in/ev 1' each   Ankle circles with board   x30 clockwise and counterclockwise  baps board L3 15 ea dir   Heel raises seated with 25# resistance on knee extension machine repeat     Towel curls    x50   SLB hip abd into wall 10\"x5 B 30 sec hold x 4 SLB hip abduction into wall       Towel in/ev  3' total  x20 each 1/2 tandem balance on 1/2 foam 2x30 sec B   REtro gait on TM 5' incline 8% 1.5 mph repeat     SLB iso arch  L 30\" 30 sec hold x 2 Left  SLB iso arch with UE rows and HABD RTB 2x15 each 3x10 B    TB 1st digit DF/PF 20x RTB, ankle DF/PF 20x  repeat   Proprioceptive Activities:                                                        Manual Therapy: 15 mins 15 min 20 min 15 min 25 mins 20 mins 15 min 15 mins 15 mins 15 mins 15 mins   MFR PF, post tib, intrinsic flexors, L MTP distraction L ankle FDN/MFR abductor and flexor hallicis FDN/Dry needling to abductor and flexor hallicis  1st MTP joint distraction, peroneaol sesmoid distal/prox mobs, STJ distraction repeat  repeat repeat repeat repeat   STM/IASTM   To posterior tibilis and plantar surface of foot STM to intrinsic foot muscles, gastroc, and post tib FDN/MFR lateal gastroc and adductor hallucis MFR lateal gastroc and adductor hallucis To gastroc and adductor hallucis MFR/FDN add ferrara, post tib, flex ferrara repeat MFR Left PF MFR Left PF   Therapeutic Activities:              Step ups (forward) with lateral resistance       x15 BLE onto 10 inch with red band above knee                                                 HEP: Provided HEP on 8/29/17  Treatment/Session Assessment: She is making slow progress. Se was educated on importance of footwear to off load her sesmoid to reduce pain. She was educated to only perform pain-free exercise. · Pain/ Symptoms: Initial:   2/10   \"My pain has not gotten much better. The insert to off my sesmoid feels better but I am not using it everyday\" Post Session: no increased pain ·   Compliance with Program/Exercises: Will assess as treatment progresses. · Recommendations/Intent for next treatment session: \"Next visit will focus on advancements to more challenging activities\".   Total Treatment Duration:  PT Patient Time In/Time Out  Time In: 6346  Time Out: [address] [facility], PT

## 2017-10-05 ENCOUNTER — APPOINTMENT (OUTPATIENT)
Dept: PHYSICAL THERAPY | Age: 22
End: 2017-10-05
Payer: COMMERCIAL

## 2017-11-09 NOTE — THERAPY DISCHARGE
Martell Reyes   [phone] [facility]  [address] [facility], [address] [place] [address]  [phone]   [phone]           PHYSICIAN COMMUNICATION and discontinuation summary    REFERRING PHYSICIAN: Noble Fontenot MD  Return Physician Appointment: unkown   MEDICAL/REFERRING DIAGNOSIS:  · Pain in unspecified foot [M79.673]  ATTENDANCE: Martell Reyes has attended 11  out of 11 visits, with 0cancellation(s) and 0 no shows. ASSESSMENT:  DATE: 2017    PROGRESS: Martell Reyes made minimal progress overall with PT services. She had initially made progress with improved walking without pain using her gym shoes. She had a sudden sharp increase in pain with swelling and redness and was referred to consult with her referring orthopedic after her pain did not improve with PT sessions. RECOMMENDATIONS: Discharge to orthopedic. Recommend con't with pain-free HEP. Thank you for this referral,  Patel Hinson, PT     Referring Physician Signature:  Noble Fontenot MD          Date

## 2017-11-28 ENCOUNTER — ANESTHESIA EVENT (OUTPATIENT)
Dept: SURGERY | Age: 22
End: 2017-11-28
Payer: COMMERCIAL

## 2017-11-29 ENCOUNTER — HOSPITAL ENCOUNTER (OUTPATIENT)
Age: 22
Setting detail: OUTPATIENT SURGERY
Discharge: HOME | End: 2017-11-29
Attending: ORTHOPAEDIC SURGERY | Admitting: ORTHOPAEDIC SURGERY
Payer: COMMERCIAL

## 2017-11-29 ENCOUNTER — ANESTHESIA (OUTPATIENT)
Dept: SURGERY | Age: 22
End: 2017-11-29
Payer: COMMERCIAL

## 2017-11-29 VITALS
TEMPERATURE: 97.5 F | HEART RATE: 74 BPM | DIASTOLIC BLOOD PRESSURE: 90 MMHG | SYSTOLIC BLOOD PRESSURE: 136 MMHG | OXYGEN SATURATION: 100 % | BODY MASS INDEX: 18.5 KG/M2 | RESPIRATION RATE: 16 BRPM | WEIGHT: 107.8 LBS

## 2017-11-29 LAB — HCG UR QL: NEGATIVE

## 2017-11-29 PROCEDURE — 74011000250 HC RX REV CODE- 250

## 2017-11-29 PROCEDURE — 74011250637 HC RX REV CODE- 250/637: Performed by: ANESTHESIOLOGY

## 2017-11-29 PROCEDURE — 74011250636 HC RX REV CODE- 250/636: Performed by: ORTHOPAEDIC SURGERY

## 2017-11-29 PROCEDURE — 76210000063 HC OR PH I REC FIRST 0.5 HR: Performed by: ORTHOPAEDIC SURGERY

## 2017-11-29 PROCEDURE — 76210000021 HC REC RM PH II 0.5 TO 1 HR: Performed by: ORTHOPAEDIC SURGERY

## 2017-11-29 PROCEDURE — 74011250636 HC RX REV CODE- 250/636: Performed by: ANESTHESIOLOGY

## 2017-11-29 PROCEDURE — 74011000250 HC RX REV CODE- 250: Performed by: ORTHOPAEDIC SURGERY

## 2017-11-29 PROCEDURE — 76010000160 HC OR TIME 0.5 TO 1 HR INTENSV-TIER 1: Performed by: ORTHOPAEDIC SURGERY

## 2017-11-29 PROCEDURE — 77030011640 HC PAD GRND REM COVD -A: Performed by: ORTHOPAEDIC SURGERY

## 2017-11-29 PROCEDURE — 74011250636 HC RX REV CODE- 250/636

## 2017-11-29 PROCEDURE — 77030020143 HC AIRWY LARYN INTUB CGAS -A: Performed by: ANESTHESIOLOGY

## 2017-11-29 PROCEDURE — 81025 URINE PREGNANCY TEST: CPT

## 2017-11-29 PROCEDURE — 77030000032 HC CUF TRNQT ZIMM -B: Performed by: ORTHOPAEDIC SURGERY

## 2017-11-29 PROCEDURE — 77030018836 HC SOL IRR NACL ICUM -A: Performed by: ORTHOPAEDIC SURGERY

## 2017-11-29 PROCEDURE — 77030002916 HC SUT ETHLN J&J -A: Performed by: ORTHOPAEDIC SURGERY

## 2017-11-29 PROCEDURE — 76060000032 HC ANESTHESIA 0.5 TO 1 HR: Performed by: ORTHOPAEDIC SURGERY

## 2017-11-29 PROCEDURE — 77030002933 HC SUT MCRYL J&J -A: Performed by: ORTHOPAEDIC SURGERY

## 2017-11-29 RX ORDER — LIDOCAINE HYDROCHLORIDE 10 MG/ML
0.1 INJECTION INFILTRATION; PERINEURAL AS NEEDED
Status: DISCONTINUED | OUTPATIENT
Start: 2017-11-29 | End: 2017-11-29 | Stop reason: HOSPADM

## 2017-11-29 RX ORDER — PROPOFOL 10 MG/ML
INJECTION, EMULSION INTRAVENOUS AS NEEDED
Status: DISCONTINUED | OUTPATIENT
Start: 2017-11-29 | End: 2017-11-29 | Stop reason: HOSPADM

## 2017-11-29 RX ORDER — LIDOCAINE HYDROCHLORIDE 20 MG/ML
INJECTION, SOLUTION EPIDURAL; INFILTRATION; INTRACAUDAL; PERINEURAL AS NEEDED
Status: DISCONTINUED | OUTPATIENT
Start: 2017-11-29 | End: 2017-11-29 | Stop reason: HOSPADM

## 2017-11-29 RX ORDER — HYDROMORPHONE HYDROCHLORIDE 2 MG/ML
0.5 INJECTION, SOLUTION INTRAMUSCULAR; INTRAVENOUS; SUBCUTANEOUS
Status: DISCONTINUED | OUTPATIENT
Start: 2017-11-29 | End: 2017-11-29 | Stop reason: HOSPADM

## 2017-11-29 RX ORDER — ALBUTEROL SULFATE 0.83 MG/ML
2.5 SOLUTION RESPIRATORY (INHALATION) AS NEEDED
Status: DISCONTINUED | OUTPATIENT
Start: 2017-11-29 | End: 2017-11-29 | Stop reason: HOSPADM

## 2017-11-29 RX ORDER — MIDAZOLAM HYDROCHLORIDE 1 MG/ML
2 INJECTION, SOLUTION INTRAMUSCULAR; INTRAVENOUS
Status: DISCONTINUED | OUTPATIENT
Start: 2017-11-29 | End: 2017-11-29 | Stop reason: HOSPADM

## 2017-11-29 RX ORDER — SODIUM CHLORIDE, SODIUM LACTATE, POTASSIUM CHLORIDE, CALCIUM CHLORIDE 600; 310; 30; 20 MG/100ML; MG/100ML; MG/100ML; MG/100ML
1000 INJECTION, SOLUTION INTRAVENOUS CONTINUOUS
Status: DISCONTINUED | OUTPATIENT
Start: 2017-11-29 | End: 2017-11-29 | Stop reason: HOSPADM

## 2017-11-29 RX ORDER — ONDANSETRON 2 MG/ML
4 INJECTION INTRAMUSCULAR; INTRAVENOUS
Status: DISCONTINUED | OUTPATIENT
Start: 2017-11-29 | End: 2017-11-29 | Stop reason: HOSPADM

## 2017-11-29 RX ORDER — ONDANSETRON 2 MG/ML
INJECTION INTRAMUSCULAR; INTRAVENOUS AS NEEDED
Status: DISCONTINUED | OUTPATIENT
Start: 2017-11-29 | End: 2017-11-29 | Stop reason: HOSPADM

## 2017-11-29 RX ORDER — CEFAZOLIN SODIUM IN 0.9 % NACL 2 G/50 ML
2 INTRAVENOUS SOLUTION, PIGGYBACK (ML) INTRAVENOUS ONCE
Status: COMPLETED | OUTPATIENT
Start: 2017-11-29 | End: 2017-11-29

## 2017-11-29 RX ORDER — FENTANYL CITRATE 50 UG/ML
INJECTION, SOLUTION INTRAMUSCULAR; INTRAVENOUS AS NEEDED
Status: DISCONTINUED | OUTPATIENT
Start: 2017-11-29 | End: 2017-11-29 | Stop reason: HOSPADM

## 2017-11-29 RX ORDER — ACETAMINOPHEN 500 MG
1000 TABLET ORAL ONCE
Status: COMPLETED | OUTPATIENT
Start: 2017-11-29 | End: 2017-11-29

## 2017-11-29 RX ORDER — BUPIVACAINE HYDROCHLORIDE 5 MG/ML
INJECTION, SOLUTION EPIDURAL; INTRACAUDAL AS NEEDED
Status: DISCONTINUED | OUTPATIENT
Start: 2017-11-29 | End: 2017-11-29 | Stop reason: HOSPADM

## 2017-11-29 RX ORDER — SODIUM CHLORIDE 0.9 % (FLUSH) 0.9 %
5-10 SYRINGE (ML) INJECTION EVERY 8 HOURS
Status: DISCONTINUED | OUTPATIENT
Start: 2017-11-29 | End: 2017-11-29 | Stop reason: HOSPADM

## 2017-11-29 RX ORDER — DEXAMETHASONE SODIUM PHOSPHATE 4 MG/ML
INJECTION, SOLUTION INTRA-ARTICULAR; INTRALESIONAL; INTRAMUSCULAR; INTRAVENOUS; SOFT TISSUE AS NEEDED
Status: DISCONTINUED | OUTPATIENT
Start: 2017-11-29 | End: 2017-11-29 | Stop reason: HOSPADM

## 2017-11-29 RX ORDER — SODIUM CHLORIDE 0.9 % (FLUSH) 0.9 %
5-10 SYRINGE (ML) INJECTION AS NEEDED
Status: DISCONTINUED | OUTPATIENT
Start: 2017-11-29 | End: 2017-11-29 | Stop reason: HOSPADM

## 2017-11-29 RX ORDER — KETOROLAC TROMETHAMINE 30 MG/ML
INJECTION, SOLUTION INTRAMUSCULAR; INTRAVENOUS AS NEEDED
Status: DISCONTINUED | OUTPATIENT
Start: 2017-11-29 | End: 2017-11-29 | Stop reason: HOSPADM

## 2017-11-29 RX ADMIN — HYDROMORPHONE HYDROCHLORIDE 0.5 MG: 2 INJECTION, SOLUTION INTRAMUSCULAR; INTRAVENOUS; SUBCUTANEOUS at 14:07

## 2017-11-29 RX ADMIN — FENTANYL CITRATE 50 MCG: 50 INJECTION, SOLUTION INTRAMUSCULAR; INTRAVENOUS at 13:09

## 2017-11-29 RX ADMIN — KETOROLAC TROMETHAMINE 30 MG: 30 INJECTION, SOLUTION INTRAMUSCULAR; INTRAVENOUS at 13:10

## 2017-11-29 RX ADMIN — PROPOFOL 200 MG: 10 INJECTION, EMULSION INTRAVENOUS at 12:42

## 2017-11-29 RX ADMIN — LIDOCAINE HYDROCHLORIDE 100 MG: 20 INJECTION, SOLUTION EPIDURAL; INFILTRATION; INTRACAUDAL; PERINEURAL at 12:42

## 2017-11-29 RX ADMIN — CEFAZOLIN 2 G: 1 INJECTION, POWDER, FOR SOLUTION INTRAMUSCULAR; INTRAVENOUS; PARENTERAL at 12:38

## 2017-11-29 RX ADMIN — ONDANSETRON 4 MG: 2 INJECTION INTRAMUSCULAR; INTRAVENOUS at 13:00

## 2017-11-29 RX ADMIN — ACETAMINOPHEN 1000 MG: 500 TABLET, FILM COATED ORAL at 12:13

## 2017-11-29 RX ADMIN — DEXAMETHASONE SODIUM PHOSPHATE 4 MG: 4 INJECTION, SOLUTION INTRA-ARTICULAR; INTRALESIONAL; INTRAMUSCULAR; INTRAVENOUS; SOFT TISSUE at 13:00

## 2017-11-29 RX ADMIN — FENTANYL CITRATE 50 MCG: 50 INJECTION, SOLUTION INTRAMUSCULAR; INTRAVENOUS at 12:57

## 2017-11-29 RX ADMIN — SODIUM CHLORIDE, SODIUM LACTATE, POTASSIUM CHLORIDE, AND CALCIUM CHLORIDE 1000 ML: 600; 310; 30; 20 INJECTION, SOLUTION INTRAVENOUS at 12:13

## 2017-11-29 NOTE — IP AVS SNAPSHOT
Shweta Lamas 
 
 
 [address] 
[phone] Patient: [facility] MRN: [id number] :1995 About your hospitalization You were admitted on:  2017 You last received care in the:  [facility] OP PACU You were discharged on:  2017 Why you were hospitalized Your primary diagnosis was:  Not on File Discharge Orders None A check luis m indicates which time of day the medication should be taken. My Medications ASK your physician about these medications Instructions Each Dose to Equal  
 Morning Noon Evening Bedtime VYVANSE 50 mg Cap Generic drug:  Lisdexamfetamine Your last dose was: Your next dose is: Take 50 mg by mouth every morning. \"for anxiety\" 50 mg Discharge Instructions INSTRUCTIONS FOLLOWING FOOT SURGERY 
 
ACTIVITY Elevate foot (feet) for 48 hours. Use crutches as directed by your doctor. No weight bearing on operative foot. DIET Clear liquids until no nausea or vomiting; then light diet for the first day. Advance to regular diet on second day, unless your doctor orders otherwise. PAIN Take pain medications as directed by your doctor. Call your doctor if pain is NOT relieved by medication. DO NOT take aspirin or blood thinners until directed by your doctor. DRESSING CARE Keep clean and dry until follow up appointment. FOLLOW-UP PHONE CALLS Calls will be made by nursing staff. If you have any problems, call your doctor as needed. CALL YOUR DOCTOR IF YOU HAVE Excessive bleeding that does not stop after holding mild pressure over the area. Temperature of 101 degrees or above. Redness, excessive swelling or bruising, and/or green or yellow, smelly discharge from incision. Loss of sensation - cold, white or blue toes. AFTER ANESTHESIA For the first 24 hours and while taking narcotics for pain: DO NOT Drive, Drink Alcoholic beverages, or make important Decisions. Be aware of dizziness following anesthesia and while taking pain medication. OTHER INSTRUCTIONS APPOINTMENT DATE/TIME  Keep scheduled follow up appointment YOUR DOCTOR'S PHONE NUMBER [phone] DISCHARGE SUMMARY from Nurse PATIENT INSTRUCTIONS: 
 
After general anesthesia or intravenous sedation, for 24 hours or while taking prescription Narcotics: · Limit your activities · Do not drive and operate hazardous machinery · Do not make important personal or business decisions · Do  not drink alcoholic beverages · If you have not urinated within 8 hours after discharge, please contact your surgeon on call. *  Please give a list of your current medications to your Primary Care Provider. *  Please update this list whenever your medications are discontinued, doses are 
    changed, or new medications (including over-the-counter products) are added. *  Please carry medication information at all times in case of emergency situations. These are general instructions for a healthy lifestyle: No smoking/ No tobacco products/ Avoid exposure to second hand smoke Surgeon General's Warning:  Quitting smoking now greatly reduces serious risk to your health. Obesity, smoking, and sedentary lifestyle greatly increases your risk for illness A healthy diet, regular physical exercise & weight monitoring are important for maintaining a healthy lifestyle You may be retaining fluid if you have a history of heart failure or if you experience any of the following symptoms:  Weight gain of 3 pounds or more overnight or 5 pounds in a week, increased swelling in our hands or feet or shortness of breath while lying flat in bed. Please call your doctor as soon as you notice any of these symptoms; do not wait until your next office visit. Recognize signs and symptoms of STROKE: 
 
F-face looks uneven A-arms unable to move or move unevenly S-speech slurred or non-existent T-time-call 911 as soon as signs and symptoms begin-DO NOT go Back to bed or wait to see if you get better-TIME IS BRAIN. Learning About How to Use Crutches Your Care Instructions Crutches can help you walk when you have an injured hip, leg, knee, ankle, or foot. Your doctor will tell you how much weight-if any-you can put on your leg. Be sure your crutches fit you. When you stand up in your normal posture, there should be space for two or three fingers between the top of the crutch and your armpit. When you let your hands hang down, the hand  should be at your wrists. When you put your hands on the hand , your elbows should be slightly bent. To stay safe when using crutches: 
· Look straight ahead, not down at your feet. · Clear away small rugs, cords, or anything else that could cause you to trip, slip, or fall. · Be very careful around pets and small children. They can get in your path when you least expect it. · Be sure the rubber tips on your crutches are clean and in good condition to help prevent slipping. · Avoid slick conditions, such as wet floors and snowy or icy driveways. In bad weather, be especially careful on curbs and steps. How to use crutches Getting ready to walk 1. Bend your elbows slightly. Press the padded top parts of the crutches against your sides, under your armpits. 2. If you have been told not to put any weight on your injured leg, keep that leg bent and off the ground. Walking with crutches 1. Put both crutches about 12 inches in front of you. 2. Put your weight on the handgrips, not on the pads under your arms. (Constant pressure against your underarms can cause numbness.) Swing your body forward. (If you have been told not to put any weight on your injured leg, keep that leg bent and off the ground.) 3. To complete the step, put your weight on the strong leg. 4. Move your crutches about 12 inches in front of you, and start the next step. 5. When you're confident using the crutches, you can move the crutches and your injured leg at the same time. Then push straight down on the crutches as you step past the crutches with your strong leg, as you would in normal walking. 6. Take small steps. 7. Use ramps and elevators when you can. Sitting down 1. To sit, back up to the chair. Use one hand to hold both crutches by the handgrips, beside your injured leg. With the other hand, hold onto the seat and slowly lower yourself onto the chair. 2. Lay the crutches on the ground near your chair. If you prop them up, they may fall over. Getting up from a chair 1. To get up from a chair,  the crutches and put them in one hand beside your injured leg. 2. Put your weight on the handgrips of the crutches and on your strong leg to stand up. Walking up stairs 1. To go up stairs, step up with your strong leg and then bring the crutches and your injured leg to the upper step. 2. For stairs that have handrails: Put both crutches under the arm opposite the handrail. Use the hand opposite the handrail to hold both crutches by the handgrips. 3. Hold onto the handrail as you go up. Put your strong leg on the step first when you go up. Walking down stairs 1. To go down stairs, put your crutches and injured leg on the lower step. 2. Bring your strong leg to the lower step. This saying may help you remember: \"Up with the good, down with the bad. \" 3. For stairs that have handrails: Put both crutches under the arm opposite the handrail. Use the hand opposite the handrail to hold both crutches by the handgrips. Hold onto the handrail as you go down. Follow the same process you use for stairs: Lead with your crutches and injured leg on the way down. Follow-up care is a key part of your treatment and safety. Be sure to make and go to all appointments, and call your doctor if you are having problems. It's also a good idea to know your test results and keep a list of the medicines you take. Where can you learn more? Go to http://jone-lidia.info/. Enter H632 in the search box to learn more about \"Learning About How to Use Crutches. \" Current as of: March 21, 2017 Content Version: 11.4 © 3080-6715 [employer]. Care instructions adapted under license by [employer] (which disclaims liability or warranty for this information). If you have questions about a medical condition or this instruction, always ask your healthcare professional. [address] any warranty or liability for your use of this information. Introducing [facility]! Dear Curtis Estes: Thank you for requesting a Doppel[employer] account. Our records indicate that you already have an active Doppelgames account. You can access your account anytime at [email or web address] Did you know that you can access your hospital and ER discharge instructions at any time in Doppelgames? You can also review all of your test results from your hospital stay or ER visit. Additional Information If you have questions, please visit the Frequently Asked Questions section of the Doppelgames website at [email or web address] Remember, Doppelgames is NOT to be used for urgent needs. For medical emergencies, dial 911. Now available from your iPhone and Android! Providers Seen During Your Hospitalization Provider Specialty Primary office phone Marcella Jacinto MD Orthopedic Surgery [phone] Your Primary Care Physician (PCP) Primary Care Physician Office Phone Office Fax OTHER, PHYS ** None ** ** None ** You are allergic to the following Allergen Reactions Erythromycin Rash Recent Documentation Weight BMI OB Status Smoking Status 48.9 kg 18.5 kg/m2 Having regular periods Never Smoker Emergency Contacts Name Discharge Info Relation Home Work Mobile Janice Tiexeira  Mother [14] [phone] Patient Belongings The following personal items are in your possession at time of discharge: 
  Dental Appliances: None  Visual Aid: Glasses      Home Medications: None   Jewelry: None  Clothing: Footwear, Undergarments, Pants, Shirt    Other Valuables: Cell Phone, Eyeglasses Please provide this summary of care documentation to your next provider. Signatures-by signing, you are acknowledging that this After Visit Summary has been reviewed with you and you have received a copy. Patient Signature:  ____________________________________________________________ Date:  ____________________________________________________________  
  
[facility] Provider Signature:  ____________________________________________________________ Date:  ____________________________________________________________

## 2017-11-29 NOTE — H&P
Outpatient Surgery History and Physical:  [facility]     CHIEF COMPLAINT:   LE    PE:     Visit Vitals    /79 (BP 1 Location: Left arm, BP Patient Position: At rest)    Pulse 88    Temp 98.4 °F (36.9 °C)    Resp 16    Wt 48.9 kg (107 lb 12.8 oz)    LMP 11/01/2017    SpO2 100%    BMI 18.5 kg/m2       Heart:  No noted problems   Lungs:  No noted problems        Past Medical History: There are no active problems to display for this patient. Surgical History:   Past Surgical History:   Procedure Laterality Date    HX HEENT      tonsils/ PE tubes X 3 surgeries    HX ORTHOPAEDIC Left age 17    ankle- bone chips removed post fx       Social History: Patient  reports that she has never smoked. She has never used smokeless tobacco. She reports that she does not drink alcohol. Family History:   Family History   Problem Relation Age of Onset    No Known Problems Mother     No Known Problems Father     No Known Problems Sister     No Known Problems Brother     No Known Problems Brother        Allergies: Reviewed per EMR  Allergies   Allergen Reactions    Erythromycin Rash       Medications:    No current facility-administered medications on file prior to encounter. No current outpatient prescriptions on file prior to encounter. The surgery is planned for the LE. History and physical has been reviewed. There have been no significant  changes since the completion of the updated history and physical.    Necessity for the procedure/care is still present and the updated history and physical office notes outline the full long term history of the problem. Please see the updated office notes for the full musculoskeletal examination and surgical plan.     Signed By: Gabriel Benavides MD     November 29, 2017 12:45 PM

## 2017-11-29 NOTE — ANESTHESIA POSTPROCEDURE EVALUATION
Post-Anesthesia Evaluation and Assessment    Patient: Carmela Hebert MRN: [id number]  SSN: [id number]    YOB: 1995  Age: 23 y.o. Sex: female       Cardiovascular Function/Vital Signs  Visit Vitals    /89    Pulse 80    Temp 36.4 °C (97.5 °F)    Resp 16    Wt 48.9 kg (107 lb 12.8 oz)    SpO2 100%    BMI 18.5 kg/m2       Patient is status post general anesthesia for Procedure(s):  LEFT FIBULAR SESAMOIDECTOMY PLANTAR HALLUCAL Excision/ CHOICE. Nausea/Vomiting: None    Postoperative hydration reviewed and adequate. Pain:  Pain Scale 1: Numeric (0 - 10) (11/29/17 1334)  Pain Intensity 1: 0 (11/29/17 1334)   Managed    Neurological Status:   Neuro (WDL): Exceptions to WDL (11/29/17 1334)  Neuro  Neurologic State: Drowsy (11/29/17 1334)   At baseline    Mental Status and Level of Consciousness: Arousable    Pulmonary Status:   O2 Device: Room air (11/29/17 1342)   Adequate oxygenation and airway patent    Complications related to anesthesia: None    Post-anesthesia assessment completed.  No concerns    Signed By: Eleanor Leo MD     November 29, 2017

## 2017-11-29 NOTE — ANESTHESIA PREPROCEDURE EVALUATION
Anesthetic History        Pertinent negatives: No PONV       Review of Systems / Medical History  Patient summary reviewed and pertinent labs reviewed    Pulmonary  Within defined limits                 Neuro/Psych   Within defined limits           Cardiovascular                  Exercise tolerance: >4 METS     GI/Hepatic/Renal  Within defined limits              Endo/Other  Within defined limits           Other Findings              Physical Exam    Airway  Mallampati: II  TM Distance: 4 - 6 cm  Neck ROM: normal range of motion   Mouth opening: Normal     Cardiovascular    Rhythm: regular  Rate: normal      Pertinent negatives: No murmur, JVD and peripheral edema   Dental  No notable dental hx       Pulmonary  Breath sounds clear to auscultation               Abdominal         Other Findings            Anesthetic Plan    ASA: 3  Anesthesia type: general          Induction: Intravenous  Anesthetic plan and risks discussed with: Patient and Mother      LMA general

## 2017-11-29 NOTE — DISCHARGE INSTRUCTIONS
INSTRUCTIONS FOLLOWING FOOT SURGERY    ACTIVITY  Elevate foot (feet) for 48 hours. Use crutches as directed by your doctor. No weight bearing on operative foot. DIET  Clear liquids until no nausea or vomiting; then light diet for the first day. Advance to regular diet on second day, unless your doctor orders otherwise. PAIN  Take pain medications as directed by your doctor. Call your doctor if pain is NOT relieved by medication. DO NOT take aspirin or blood thinners until directed by your doctor. DRESSING CARE  Keep clean and dry until follow up appointment. FOLLOW-UP PHONE CALLS  Calls will be made by nursing staff. If you have any problems, call your doctor as needed. CALL YOUR DOCTOR IF YOU HAVE  Excessive bleeding that does not stop after holding mild pressure over the area. Temperature of 101 degrees or above. Redness, excessive swelling or bruising, and/or green or yellow, smelly discharge from incision. Loss of sensation - cold, white or blue toes. AFTER ANESTHESIA  For the first 24 hours and while taking narcotics for pain: DO NOT Drive, Drink Alcoholic beverages, or make important Decisions. Be aware of dizziness following anesthesia and while taking pain medication. OTHER INSTRUCTIONS    APPOINTMENT DATE/TIME  Keep scheduled follow up appointment    YOUR DOCTOR'S PHONE NUMBER [phone]      DISCHARGE SUMMARY from Nurse    PATIENT INSTRUCTIONS:    After general anesthesia or intravenous sedation, for 24 hours or while taking prescription Narcotics:  · Limit your activities  · Do not drive and operate hazardous machinery  · Do not make important personal or business decisions  · Do  not drink alcoholic beverages  · If you have not urinated within 8 hours after discharge, please contact your surgeon on call. *  Please give a list of your current medications to your Primary Care Provider.     *  Please update this list whenever your medications are discontinued, doses are changed, or new medications (including over-the-counter products) are added. *  Please carry medication information at all times in case of emergency situations. These are general instructions for a healthy lifestyle:    No smoking/ No tobacco products/ Avoid exposure to second hand smoke    Surgeon General's Warning:  Quitting smoking now greatly reduces serious risk to your health. Obesity, smoking, and sedentary lifestyle greatly increases your risk for illness    A healthy diet, regular physical exercise & weight monitoring are important for maintaining a healthy lifestyle    You may be retaining fluid if you have a history of heart failure or if you experience any of the following symptoms:  Weight gain of 3 pounds or more overnight or 5 pounds in a week, increased swelling in our hands or feet or shortness of breath while lying flat in bed. Please call your doctor as soon as you notice any of these symptoms; do not wait until your next office visit. Recognize signs and symptoms of STROKE:    F-face looks uneven    A-arms unable to move or move unevenly    S-speech slurred or non-existent    T-time-call 911 as soon as signs and symptoms begin-DO NOT go       Back to bed or wait to see if you get better-TIME IS BRAIN. Learning About How to Use Crutches  Your Care Instructions  Crutches can help you walk when you have an injured hip, leg, knee, ankle, or foot. Your doctor will tell you how much weight-if any-you can put on your leg. Be sure your crutches fit you. When you stand up in your normal posture, there should be space for two or three fingers between the top of the crutch and your armpit. When you let your hands hang down, the hand  should be at your wrists. When you put your hands on the hand , your elbows should be slightly bent. To stay safe when using crutches:  · Look straight ahead, not down at your feet.   · Clear away small rugs, cords, or anything else that could cause you to trip, slip, or fall. · Be very careful around pets and small children. They can get in your path when you least expect it. · Be sure the rubber tips on your crutches are clean and in good condition to help prevent slipping. · Avoid slick conditions, such as wet floors and snowy or icy driveways. In bad weather, be especially careful on curbs and steps. How to use crutches  Getting ready to walk    1. Bend your elbows slightly. Press the padded top parts of the crutches against your sides, under your armpits. 2. If you have been told not to put any weight on your injured leg, keep that leg bent and off the ground. Walking with crutches    1. Put both crutches about 12 inches in front of you. 2. Put your weight on the handgrips, not on the pads under your arms. (Constant pressure against your underarms can cause numbness.) Swing your body forward. (If you have been told not to put any weight on your injured leg, keep that leg bent and off the ground.)  3. To complete the step, put your weight on the strong leg. 4. Move your crutches about 12 inches in front of you, and start the next step. 5. When you're confident using the crutches, you can move the crutches and your injured leg at the same time. Then push straight down on the crutches as you step past the crutches with your strong leg, as you would in normal walking. 6. Take small steps. 7. Use ramps and elevators when you can. Sitting down    1. To sit, back up to the chair. Use one hand to hold both crutches by the handgrips, beside your injured leg. With the other hand, hold onto the seat and slowly lower yourself onto the chair. 2. Lay the crutches on the ground near your chair. If you prop them up, they may fall over. Getting up from a chair    1. To get up from a chair,  the crutches and put them in one hand beside your injured leg.   2. Put your weight on the handgrips of the crutches and on your strong leg to stand up.  Walking up stairs    1. To go up stairs, step up with your strong leg and then bring the crutches and your injured leg to the upper step. 2. For stairs that have handrails: Put both crutches under the arm opposite the handrail. Use the hand opposite the handrail to hold both crutches by the handgrips. 3. Hold onto the handrail as you go up. Put your strong leg on the step first when you go up. Walking down stairs    1. To go down stairs, put your crutches and injured leg on the lower step. 2. Bring your strong leg to the lower step. This saying may help you remember: \"Up with the good, down with the bad. \"  3. For stairs that have handrails: Put both crutches under the arm opposite the handrail. Use the hand opposite the handrail to hold both crutches by the handgrips. Hold onto the handrail as you go down. Follow the same process you use for stairs: Lead with your crutches and injured leg on the way down. Follow-up care is a key part of your treatment and safety. Be sure to make and go to all appointments, and call your doctor if you are having problems. It's also a good idea to know your test results and keep a list of the medicines you take. Where can you learn more? Go to http://jone-lidia.info/. Enter S531 in the search box to learn more about \"Learning About How to Use Crutches. \"  Current as of: March 21, 2017  Content Version: 11.4  © 6987-5984 [employer]. Care instructions adapted under license by [employer] (which disclaims liability or warranty for this information). If you have questions about a medical condition or this instruction, always ask your healthcare professional. [address] any warranty or liability for your use of this information.

## 2017-11-29 NOTE — IP AVS SNAPSHOT
Summary of Care Report The Summary of Care report has been created to help improve care coordination. Users with access to [employer] or [address] (Web-based application) may access additional patient information including the Discharge Summary. If you are not currently a [address] user and need more information, please call the number listed below in the [place] [address] section and ask to be connected with Medical Records. Facility Information Name Address Phone [address] [address] [address] [phone] Patient Information Patient Name Sex  Lashawn Cuff ([id number]) Female 1995 Discharge Information Admitting Provider Service Area Unit Janette Guerrero MD / [address] [place] [address] / [phone] Discharge Provider Discharge Date/Time Discharge Disposition Destination (none) (none) (none) (none) Patient Language Language ENGLISH [13] You are allergic to the following Allergen Reactions Erythromycin Rash Current Discharge Medication List  
  
ASK your doctor about these medications Dose & Instructions Dispensing Information Comments VYVANSE 50 mg Cap Generic drug:  Lisdexamfetamine Dose:  50 mg Take 50 mg by mouth every morning. \"for anxiety\" Refills:  0 Surgery Information ID Date/Time Status Primary Surgeon All Procedures Location [id number] 2017 1315 Unposted Janette Guerrero MD LEFT FIBULAR SESAMOIDECTOMY PLANTAR HALLUCAL Excision/ CHOICE [facility] Follow-up Information None Discharge Instructions INSTRUCTIONS FOLLOWING FOOT SURGERY 
 
ACTIVITY Elevate foot (feet) for 48 hours. Use crutches as directed by your doctor. No weight bearing on operative foot. DIET Clear liquids until no nausea or vomiting; then light diet for the first day. Advance to regular diet on second day, unless your doctor orders otherwise. PAIN Take pain medications as directed by your doctor. Call your doctor if pain is NOT relieved by medication. DO NOT take aspirin or blood thinners until directed by your doctor. DRESSING CARE Keep clean and dry until follow up appointment. FOLLOW-UP PHONE CALLS Calls will be made by nursing staff. If you have any problems, call your doctor as needed. CALL YOUR DOCTOR IF YOU HAVE Excessive bleeding that does not stop after holding mild pressure over the area. Temperature of 101 degrees or above. Redness, excessive swelling or bruising, and/or green or yellow, smelly discharge from incision. Loss of sensation - cold, white or blue toes. AFTER ANESTHESIA For the first 24 hours and while taking narcotics for pain: DO NOT Drive, Drink Alcoholic beverages, or make important Decisions. Be aware of dizziness following anesthesia and while taking pain medication. OTHER INSTRUCTIONS APPOINTMENT DATE/TIME  Keep scheduled follow up appointment YOUR DOCTOR'S PHONE NUMBER [phone] DISCHARGE SUMMARY from Nurse PATIENT INSTRUCTIONS: 
 
After general anesthesia or intravenous sedation, for 24 hours or while taking prescription Narcotics: · Limit your activities · Do not drive and operate hazardous machinery · Do not make important personal or business decisions · Do  not drink alcoholic beverages · If you have not urinated within 8 hours after discharge, please contact your surgeon on call. *  Please give a list of your current medications to your Primary Care Provider. *  Please update this list whenever your medications are discontinued, doses are 
    changed, or new medications (including over-the-counter products) are added. *  Please carry medication information at all times in case of emergency situations. These are general instructions for a healthy lifestyle: No smoking/ No tobacco products/ Avoid exposure to second hand smoke Surgeon General's Warning:  Quitting smoking now greatly reduces serious risk to your health. Obesity, smoking, and sedentary lifestyle greatly increases your risk for illness A healthy diet, regular physical exercise & weight monitoring are important for maintaining a healthy lifestyle You may be retaining fluid if you have a history of heart failure or if you experience any of the following symptoms:  Weight gain of 3 pounds or more overnight or 5 pounds in a week, increased swelling in our hands or feet or shortness of breath while lying flat in bed. Please call your doctor as soon as you notice any of these symptoms; do not wait until your next office visit. Recognize signs and symptoms of STROKE: 
 
F-face looks uneven A-arms unable to move or move unevenly S-speech slurred or non-existent T-time-call 911 as soon as signs and symptoms begin-DO NOT go Back to bed or wait to see if you get better-TIME IS BRAIN. Learning About How to Use Crutches Your Care Instructions Crutches can help you walk when you have an injured hip, leg, knee, ankle, or foot. Your doctor will tell you how much weight-if any-you can put on your leg. Be sure your crutches fit you. When you stand up in your normal posture, there should be space for two or three fingers between the top of the crutch and your armpit. When you let your hands hang down, the hand  should be at your wrists. When you put your hands on the hand , your elbows should be slightly bent. To stay safe when using crutches: 
· Look straight ahead, not down at your feet. · Clear away small rugs, cords, or anything else that could cause you to trip, slip, or fall. · Be very careful around pets and small children. They can get in your path when you least expect it. · Be sure the rubber tips on your crutches are clean and in good condition to help prevent slipping. · Avoid slick conditions, such as wet floors and snowy or icy driveways. In bad weather, be especially careful on curbs and steps. How to use crutches Getting ready to walk 1. Bend your elbows slightly. Press the padded top parts of the crutches against your sides, under your armpits. 2. If you have been told not to put any weight on your injured leg, keep that leg bent and off the ground. Walking with crutches 1. Put both crutches about 12 inches in front of you. 2. Put your weight on the handgrips, not on the pads under your arms. (Constant pressure against your underarms can cause numbness.) Swing your body forward. (If you have been told not to put any weight on your injured leg, keep that leg bent and off the ground.) 3. To complete the step, put your weight on the strong leg. 4. Move your crutches about 12 inches in front of you, and start the next step. 5. When you're confident using the crutches, you can move the crutches and your injured leg at the same time. Then push straight down on the crutches as you step past the crutches with your strong leg, as you would in normal walking. 6. Take small steps. 7. Use ramps and elevators when you can. Sitting down 1. To sit, back up to the chair. Use one hand to hold both crutches by the handgrips, beside your injured leg. With the other hand, hold onto the seat and slowly lower yourself onto the chair. 2. Lay the crutches on the ground near your chair. If you prop them up, they may fall over. Getting up from a chair 1. To get up from a chair,  the crutches and put them in one hand beside your injured leg. 2. Put your weight on the handgrips of the crutches and on your strong leg to stand up. Walking up stairs 1. To go up stairs, step up with your strong leg and then bring the crutches and your injured leg to the upper step. 2. For stairs that have handrails: Put both crutches under the arm opposite the handrail. Use the hand opposite the handrail to hold both crutches by the handgrips. 3. Hold onto the handrail as you go up. Put your strong leg on the step first when you go up. Walking down stairs 1. To go down stairs, put your crutches and injured leg on the lower step. 2. Bring your strong leg to the lower step. This saying may help you remember: \"Up with the good, down with the bad. \" 3. For stairs that have handrails: Put both crutches under the arm opposite the handrail. Use the hand opposite the handrail to hold both crutches by the handgrips. Hold onto the handrail as you go down. Follow the same process you use for stairs: Lead with your crutches and injured leg on the way down. Follow-up care is a key part of your treatment and safety. Be sure to make and go to all appointments, and call your doctor if you are having problems. It's also a good idea to know your test results and keep a list of the medicines you take. Where can you learn more? Go to http://jone-lidia.info/. Enter R370 in the search box to learn more about \"Learning About How to Use Crutches. \" Current as of: March 21, 2017 Content Version: 11.4 © 1190-9882 [employer]wise, [employer]. Care instructions adapted under license by [employer] (which disclaims liability or warranty for this information). If you have questions about a medical condition or this instruction, always ask your healthcare professional. [place] [address] any warranty or liability for your use of this information. Chart Review Routing History No Routing History on File

## 2017-11-30 NOTE — OP NOTES
[facility]   OPERATIVE REPORT       Name:  Cornelia Rivera   MR#:  [id number]   :  1995   Account #:  [de-identified]   Date of Adm:  2017       PREOPERATIVE DIAGNOSIS: Left fibular sesamoid avascular   necrosis. POSTOPERATIVE DIAGNOSIS: Left fibular sesamoid avascular   necrosis. PROCEDURE PERFORMED: Left fibular sesamoidectomy. SURGEON: Elliot Mckeon. MD Bri.    ANESTHESIA: General plus local.    FLUIDS: Crystalloid. ESTIMATED BLOOD LOSS: Minimal.    SPECIMENS REMOVED: None. DRAINS: None. COMPLICATIONS: None. TOURNIQUET TIME: Less than an hour. FINDINGS INTRAOPERATIVELY: The patient had significant   abnormality and scarring around her fibular sesamoid. Upon   opening the metatarsophalangeal joint, she had at least 4-5 mL   of fluid. I was able to protect the plantar lateral hallucal   nerve as well as the big toe tendon. SURGERY IN DETAIL: After successful induction of general   anesthesia, the right leg was scrubbed, prepped, and draped in   the usual sterile fashion. Antibiotic issued. Time-out procedure   and identification of surgical markings were done by me. Through   a plantar lateral approach, I was able to protect the plantar   hallucal nerve and identify the fibular sesamoid. Fibular   sesamoidectomy was performed without difficulty. After fibular   sesamoidectomy, the great toe tendon was directly visualized and   there was no damage to the tendon nor to the nerve. Wound was   then thoroughly cleaned and closed with Monocryl and Ethilon. The patient was placed in a sterile dressing and seemed to   tolerate the procedure well.         MD SEBAS Swenson / Carmel Vogt   D:  2017   13:24   T:  2017   10:36   Job #:  [id number]

## 2018-01-09 NOTE — PROGRESS NOTES
Ambulatory/Rehab Services H2 Model Falls Risk Assessment    Risk Factor Pts. ·   Confusion/Disorientation/Impulsivity  []    4 ·   Symptomatic Depression  []   2 ·   Altered Elimination  []   1 ·   Dizziness/Vertigo  []   1 ·   Gender (Male)  []   1 ·   Any administered antiepileptics (anticonvulsants):  []   2 ·   Any administered benzodiazepines:  []   1 ·   Visual Impairment (specify):  []   1 ·   Portable Oxygen Use  []   1 ·   Orthostatic ? BP  []   1 ·   History of Recent Falls (within 3 mos.)  []   5     Ability to Rise from Chair (choose one) Pts. ·   Ability to rise in a single movement  [x]   0 ·   Pushes up, successful in one attempt  []   1 ·   Multiple attempts, but successful  []   3 ·   Unable to rise without assistance  []   4   Total: (5 or greater = High Risk) 0     Falls Prevention Plan:   []                Physical Limitations to Exercise (specify):   []                Mobility Assistance Device (type):   []                Exercise/Equipment Adaptation (specify):    ©2010 [facility] [address] [place] [address] Patent #[id number].  Federal Law prohibits the replication, distribution or use without written permission from [facility] [employer]

## 2018-01-09 NOTE — THERAPY EVALUATION
REHABILITATION [facility]  : 1995 [facility] [address]  [facility] [address]  [phone]   [phone]        OUTPATIENT PHYSICAL THERAPY:Initial Assessment 2018        ICD-10: Treatment Diagnosis: Pain in left foot (M79.672)Effusion, left foot (M25.475)  Precautions/Allergies:   Erythromycin   Fall Risk Score: 0 (? 5 = High Risk)  MD Orders: Evaluation and treatment  MEDICAL/REFERRING DIAGNOSIS:  Complex regional pain syndrome I, unspecified [G90.50]  Pain in left foot [M79.672]   DATE OF ONSET: sp sesmoidectomy 19 with 6 mos hx of on/off pain prior to surgery   REFERRING PHYSICIAN: Nina Sexton MD  RETURN PHYSICIAN APPOINTMENT: TBD     INITIAL ASSESSMENT:  Ms. Sherine Mata presents with decreased strength R LE, decreased ROM, pain, and swelling L foot. Her symptoms are consistent with right s/p sesamoidectomy and complex regional pain syndrome. She has a high level of irritability and pain. She reports frequent episodes of foot swelling, pain, trophic changes with insidious onset. She will benefit from PT serivces with focus on pain control and de-sensitization so she can return to PLOF. PROBLEM LIST (Impacting functional limitations):  1. Decreased Strength  2. Decreased ADL/Functional Activities  3. Decreased Ambulation Ability/Technique  4. Decreased Balance  5. Increased Pain  6. Decreased Flexibility/Joint Mobility INTERVENTIONS PLANNED:  1. Balance Exercise  2. Cold  3. Cryotherapy  4. Electrical Stimulation  5. Gait Training  6. Heat  7. Home Exercise Program (HEP)  8. Manual Therapy  9. Range of Motion (ROM)  10. Therapeutic Activites  11. Therapeutic Exercise/Strengthening   12. Aquatics   TREATMENT PLAN:  Effective Dates: 18 TO 4/3/18. Frequency/Duration: 2-3 times a week for 12 weeks  GOALS: (Goals have been discussed and agreed upon with patient.)  Short-Term Functional Goals: Time Frame: 2 weeks   1.  Patient will be independent with HEP without pain. 2. Patient will have improved gait pattern WNL wearing gym shoes with cushion and rigid base, allowing her to ambulate 10 minutes without pain. 3. Patient will have improved SLB to 30 seconds without pain improving intrinsic foot strength. Discharge Goals: Time Frame: 12 weeks  1. Patient will have improved LEFS to > 60/80 allowing her to return to community participation without pain. 2. Patient will have improved mmt to 5-/5 B LE allowing her to progress to ascending/descending stairs without pain. 3. 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 Rehabilitation Potential For Stated Goals: Good  Regarding [facility]'s therapy, I certify that the treatment plan above will be carried out by a therapist or under their direction. Thank you for this referral,  Abhilash Valdez PT       Referring Physician Signature: Vick Miller MD              Date                      HISTORY:   History of Present Injury/Illness (Reason for Referral):  24 y/o F with hx of left foot pain at the ball of her foot. She has been diagnosed with sesamoiditis and was making progress with PT services but had a sudden severe increase in pain and swelling and had a left sesamoidectomy on 11/29/17. She continues to have pain left foot 8/10 intensity daily. She has trophic changes and has been diagnosed with CRPS. She has easy flare ups without   Past Medical History/Comorbidities:   Ms. Jax Peters  has no past medical history on file. Ms. Jax Peters  has a past surgical history that includes hx orthopaedic (Left, age 17) and hx heent.   Social History/Living Environment:     Independent, [place] on campus with roommates   Prior Level of Function/Work/Activity:  Independent with running and Equestrian  Previous Treatment Approaches:          Prior PT, she never got her prescribed orthotics   Current Medications: Current Outpatient Prescriptions:     Lisdexamfetamine (VYVANSE) 50 mg cap, Take 50 mg by mouth every morning. \"for anxiety\", Disp: , Rfl:    Date Last Reviewed:  1/11/2018   # of Personal Factors/Comorbidities that affect the Plan of Care: 1-2: MODERATE COMPLEXITY   EXAMINATION:   Observation/Orthostatic Postural Assessment:          Left foot purple coloration (especially 1st digit), scar is clean and healed, toe nail dry and cracking, cracking skin lateral foot   Palpation:          Sensitive with light touch left plantar surface   ROM:     AROM/PROM  DF R 20/25 L 10/15  PF R 45/60 L 15/20   IN R 30/40 L 15/20  EV R 20/25 L 5/10   First MTP flexion R 45/50 L 10/15 deg with pain  First MTP extension R 45/60 deg L 5/10 deg with pain       Strength:     mmt  Hip abduction 4/5 B  Hip extension 4/5 B  Ankle DF R 5/5 L 4-/5  Ankle PF R 5/5 L 4-/5  Ankle IN R 5/5 L 4-/5  Ankle EV R 5/5 L 4-/5       Special Tests:          n/a  Neurological Screen:        Sensation: llight touch intact   Functional Mobility:         Gait/Ambulation:  Immobilizer boot left ankle with increased L LE ER with stance and decreased hip extension   Balance:          SLB unable L LE with pain R good    Body Structures Involved:  1. Bones  2. Joints  3. Muscles Body Functions Affected:  1. Neuromusculoskeletal  2. Movement Related Activities and Participation Affected:  1. General Tasks and Demands  2. Mobility  3. Self Care  4. Community, Social and [place]   # of elements that affect the Plan of Care: 3: MODERATE COMPLEXITY   CLINICAL PRESENTATION:   Presentation: Evolving clinical presentation with changing clinical characteristics: MODERATE COMPLEXITY   CLINICAL DECISION MAKING:   Outcome Measure:    Tool Used: Lower Extremity Functional Scale (LEFS)  Score:  Initial: 33/80 Most Recent: X/80 (Date: -- )   Interpretation of Score: 20 questions each scored on a 5 point scale with 0 representing \"extreme difficulty or unable to perform\" and 4 representing \"no difficulty\". The lower the score, the greater the functional disability. 80/80 represents no disability. Minimal detectable change is 9 points. Score 80 79-63 62-48 47-32 31-16 15-1 0   Modifier CH CI CJ CK CL CM CN       · Patient is expected to demonstrate progress in strength, range of motion, balance, coordination and functional technique to increase independence with with gait. Reason for Services/Other Comments:  · Patient has been observed to have decreased strength and ROM before, during or after an intervention. Use of outcome tool(s) and clinical judgement create a POC that gives a: Questionable prediction of patient's progress: MODERATE COMPLEXITY   TREATMENT:   (In addition to Assessment/Re-Assessment sessions the following treatments were rendered)  THERAPEUTIC EXERCISE: (see grid for minutes):  Exercises per grid below to improve mobility and strength. Required moderate visual, verbal, manual and tactile cues to promote proper body alignment, promote proper body posture and promote proper body mechanics. Progressed resistance, range and repetitions as indicated. MANUAL THERAPY: (see grid for minutes): Joint mobilization and Soft tissue mobilization was utilized and necessary because of the patient's restricted joint motion, painful spasm and loss of articular motion. MODALITIES: (see grid for minutes): *  Ultrasound was used today secondary to the patient having symptomatic soft tissue calcification. Ultrasound was used today to reduce pain, reduce spasm, reduce joint stiffness, increase muscle flexibility, increase tendon flexibility and increase ligament flexibility. *  Iontophoresis was used today secondary to the patient having thick adhesive scars and was used for the topical delivery of dexa into the patient's to reduce pain. *  Electrical Stimulation Therapy (IFC) was provided with intensity adjusted throughout treatment to patient tolerance.  to reduce pain       *  Cold Pack Therapy in order to provide analgesia and reduce inflammation and edema. *  Hot Pack Therapy in order to relieve muscle spasm. Date: 1/11/18       Modalities: 8 min       US with keto 8 min 1 mhz x 1.2 ntensity L PF surface around sesmoid                       Therapeutic Exercise: 10 mins       Towel crunches and inv/ev 1' each       [place] board 4 dir 30x each       Long sit Ankle pumps with mirror 1'       [place]  1'                       Proprioceptive Activities:                                Manual Therapy:                        Therapeutic Activities:                                        HEP: Provided HEP handout on 1/11/18  Treatment/Session Assessment:  Demonstrated good teach back with HEP. · Pain/ Symptoms: Initial:   8/10 Post Session:  8/10 ·   Compliance with Program/Exercises: Will assess as treatment progresses. · Recommendations/Intent for next treatment session: \"Next visit will focus on advancements to more challenging activities\".   Total Treatment Duration:  PT Patient Time In/Time Out  Time In: 0930  Time Out: 189 Priscilla Pelaez, PT

## 2018-01-11 ENCOUNTER — HOSPITAL ENCOUNTER (OUTPATIENT)
Dept: PHYSICAL THERAPY | Age: 23
Discharge: HOME | End: 2018-01-11
Payer: COMMERCIAL

## 2018-01-11 PROCEDURE — 97110 THERAPEUTIC EXERCISES: CPT

## 2018-01-11 PROCEDURE — 97162 PT EVAL MOD COMPLEX 30 MIN: CPT

## 2018-01-11 PROCEDURE — 97035 APP MDLTY 1+ULTRASOUND EA 15: CPT

## 2018-01-15 NOTE — PROGRESS NOTES
[facility]  : 1995 [facility] [address]  [address] [address].  [phone]   [phone]        OUTPATIENT PHYSICAL THERAPY:Daily Note 2018        ICD-10: Treatment Diagnosis: Pain in left foot (M79.672)Effusion, left foot (M25.475)  Precautions/Allergies:   Erythromycin   Fall Risk Score: 0 (? 5 = High Risk)  MD Orders: Evaluation and treatment  MEDICAL/REFERRING DIAGNOSIS:  Complex regional pain syndrome I, unspecified [G90.50]  Pain in left foot [M79.672]   DATE OF ONSET: sp sesmoidectomy 17 with 6 mos hx of on/off pain prior to surgery   REFERRING PHYSICIAN: Vick Miller MD  RETURN PHYSICIAN APPOINTMENT: TBD     INITIAL ASSESSMENT:  Ms. Jax Peters presents with decreased strength R LE, decreased ROM, pain, and swelling L foot. Her symptoms are consistent with right s/p sesamoidectomy and complex regional pain syndrome. She has a high level of irritability and pain. She reports frequent episodes of foot swelling, pain, trophic changes with insidious onset. She will benefit from PT serivces with focus on pain control and de-sensitization so she can return to OF. PROBLEM LIST (Impacting functional limitations):  1. Decreased Strength  2. Decreased ADL/Functional Activities  3. Decreased Ambulation Ability/Technique  4. Decreased Balance  5. Increased Pain  6. Decreased Flexibility/Joint Mobility INTERVENTIONS PLANNED:  1. Balance Exercise  2. Cold  3. Cryotherapy  4. Electrical Stimulation  5. Gait Training  6. Heat  7. Home Exercise Program (HEP)  8. Manual Therapy  9. Range of Motion (ROM)  10. Therapeutic Activites  11. Therapeutic Exercise/Strengthening   12. Aquatics   TREATMENT PLAN:  Effective Dates: 18 TO 4/3/18. Frequency/Duration: 2-3 times a week for 12 weeks  GOALS: (Goals have been discussed and agreed upon with patient.)  Short-Term Functional Goals: Time Frame: 2 weeks   1.  Patient will be independent with HEP without pain. 2. Patient will have improved gait pattern WNL wearing gym shoes with cushion and rigid base, allowing her to ambulate 10 minutes without pain. 3. Patient will have improved SLB to 30 seconds without pain improving intrinsic foot strength. Discharge Goals: Time Frame: 12 weeks  1. Patient will have improved LEFS to > 60/80 allowing her to return to community participation without pain. 2. Patient will have improved mmt to 5-/5 B LE allowing her to progress to ascending/descending stairs without pain. 3. 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 Rehabilitation Potential For Stated Goals: Good                HISTORY:   History of Present Injury/Illness (Reason for Referral):  24 y/o F with hx of left foot pain at the ball of her foot. She has been diagnosed with sesamoiditis and was making progress with PT services but had a sudden severe increase in pain and swelling and had a left sesamoidectomy on 11/29/17. She continues to have pain left foot 8/10 intensity daily. She has trophic changes and has been diagnosed with CRPS. She has easy flare ups without   Past Medical History/Comorbidities:   Ms. Priscila Rojas  has no past medical history on file. Ms. Priscila Rojas  has a past surgical history that includes hx orthopaedic (Left, age 17) and hx heent. Social History/Living Environment:     Independent, lives on campus with roommates   Prior Level of Function/Work/Activity:  Independent with running and Equestrian  Previous Treatment Approaches:          Prior PT, she never got her prescribed orthotics   Current Medications:    Current Outpatient Prescriptions:     Lisdexamfetamine (VYVANSE) 50 mg cap, Take 50 mg by mouth every morning.   \"for anxiety\", Disp: , Rfl:    Date Last Reviewed:  1/16/2018   EXAMINATION:   Observation/Orthostatic Postural Assessment:          Left foot purple coloration (especially 1st digit), scar is clean and healed, toe nail dry and cracking, cracking skin lateral foot   Palpation:          Sensitive with light touch left plantar surface   ROM:     AROM/PROM  DF R 20/25 L 10/15  PF R 45/60 L 15/20   IN R 30/40 L 15/20  EV R 20/25 L 5/10   First MTP flexion R 45/50 L 10/15 deg with pain  First MTP extension R 45/60 deg L 5/10 deg with pain       Strength:     mmt  Hip abduction 4/5 B  Hip extension 4/5 B  Ankle DF R 5/5 L 4-/5  Ankle PF R 5/5 L 4-/5  Ankle IN R 5/5 L 4-/5  Ankle EV R 5/5 L 4-/5       Special Tests:          n/a  Neurological Screen:        Sensation: llight touch intact   Functional Mobility:         Gait/Ambulation:  Immobilizer boot left ankle with increased L LE ER with stance and decreased hip extension   Balance:          SLB unable L LE with pain R good    Body Structures Involved:  1. Bones  2. Joints  3. Muscles Body Functions Affected:  1. Neuromusculoskeletal  2. Movement Related Activities and Participation Affected:  1. General Tasks and Demands  2. Mobility  3. Self Care  4. Community, Social and Civic Life   CLINICAL PRESENTATION:   CLINICAL DECISION MAKING:   Outcome Measure: Tool Used: Lower Extremity Functional Scale (LEFS)  Score:  Initial: 33/80 Most Recent: X/80 (Date: -- )   Interpretation of Score: 20 questions each scored on a 5 point scale with 0 representing \"extreme difficulty or unable to perform\" and 4 representing \"no difficulty\". The lower the score, the greater the functional disability. 80/80 represents no disability. Minimal detectable change is 9 points. Score 80 79-63 62-48 47-32 31-16 15-1 0   Modifier CH CI CJ CK CL CM CN       · Patient is expected to demonstrate progress in strength, range of motion, balance, coordination and functional technique to increase independence with with gait. Reason for Services/Other Comments:  · Patient has been observed to have decreased strength and ROM before, during or after an intervention. TREATMENT:   (In addition to Assessment/Re-Assessment sessions the following treatments were rendered)  THERAPEUTIC EXERCISE: (see grid for minutes):  Exercises per grid below to improve mobility and strength. Required moderate visual, verbal, manual and tactile cues to promote proper body alignment, promote proper body posture and promote proper body mechanics. Progressed resistance, range and repetitions as indicated. MANUAL THERAPY: (see grid for minutes): Joint mobilization and Soft tissue mobilization was utilized and necessary because of the patient's restricted joint motion, painful spasm and loss of articular motion. MODALITIES: (see grid for minutes): *  Ultrasound was used today secondary to the patient having symptomatic soft tissue calcification. Ultrasound was used today to reduce pain, reduce spasm, reduce joint stiffness, increase muscle flexibility, increase tendon flexibility and increase ligament flexibility. *  Iontophoresis was used today secondary to the patient having thick adhesive scars and was used for the topical delivery of dexa into the patient's to reduce pain. *  Electrical Stimulation Therapy (IFC) was provided with intensity adjusted throughout treatment to patient tolerance. to reduce pain       *  Cold Pack Therapy in order to provide analgesia and reduce inflammation and edema. *  Hot Pack Therapy in order to relieve muscle spasm.      Date: 1/11/18 1/16/18  (visit 2)      Modalities: 8 min 8 mins      US with keto 8 min 1 mhz x 1.2 ntensity L PF surface around sesmoid 8' 1 mhx x 1.5 intensity with keto                      Therapeutic Exercise: 10 mins       Towel crunches and inv/ev 1' each       [place] board 4 dir 30x each       Long sit Ankle pumps with mirror 1'       [place]  1'       edu on HEP  D1/D2 with TB, PROM toe fexion/extension using mirror              Proprioceptive Activities:                                Manual Therapy:  10 mis      Joints stabs   D1 and D2 3x10 each               Aquatic therapy:  45 mins      Deep Well Jogging  30'' running, backward frog, side crab, scissor, jacks      Gait 4 dir  3 laps each       FIn at the end of pool  5x1'      SLB with alternate UE ABD and SLB L LE with hip opener rotation to right (hip ER)  3x10 with L LE SLB, 30x ER with paddles resistance      HEP: Provided HEP handout on 1/11/18  Treatment/Session Assessment:  She was educated to elevate her foot to avoid it becoming cold in class. She tolerated aquatics walking and deep well jogging wihtout c/o of pain. She had significant muscle weakness with LE PNF patterns in D1/D2. She was educated on HEP TB D1 and D2 PF. Con't with POC with aquatics at next visi.t     · Pain/ Symptoms: Initial:   8/10  \"I am not worse or better. I am able to do the exercises with muscle soreness afterward. \" Post Session:  8/10 ·   Compliance with Program/Exercises: Will assess as treatment progresses. · Recommendations/Intent for next treatment session: \"Next visit will focus on advancements to more challenging activities\". Aquatics next visit.    Total Treatment Duration:  PT Patient Time In/Time Out  Time In: 5464  Time Out: Ricardo Calderon

## 2018-01-16 ENCOUNTER — HOSPITAL ENCOUNTER (OUTPATIENT)
Dept: PHYSICAL THERAPY | Age: 23
Discharge: HOME | End: 2018-01-16
Payer: COMMERCIAL

## 2018-01-16 PROCEDURE — 97113 AQUATIC THERAPY/EXERCISES: CPT

## 2018-01-16 PROCEDURE — 97035 APP MDLTY 1+ULTRASOUND EA 15: CPT

## 2018-01-16 PROCEDURE — 97140 MANUAL THERAPY 1/> REGIONS: CPT

## 2018-01-19 ENCOUNTER — HOSPITAL ENCOUNTER (OUTPATIENT)
Dept: PHYSICAL THERAPY | Age: 23
Discharge: HOME | End: 2018-01-19
Payer: COMMERCIAL

## 2018-01-19 PROCEDURE — 97113 AQUATIC THERAPY/EXERCISES: CPT

## 2018-01-19 PROCEDURE — 97035 APP MDLTY 1+ULTRASOUND EA 15: CPT

## 2018-01-19 PROCEDURE — 97140 MANUAL THERAPY 1/> REGIONS: CPT

## 2018-01-19 NOTE — PROGRESS NOTES
[facility]  : 1995 [facility] [address]  [address]  [phone]   [phone]        OUTPATIENT PHYSICAL THERAPY:Daily Note 2018        ICD-10: Treatment Diagnosis: Pain in left foot (M79.672)Effusion, left foot (M25.475)  Precautions/Allergies:   Erythromycin   Fall Risk Score: 0 (? 5 = High Risk)  MD Orders: Evaluation and treatment  MEDICAL/REFERRING DIAGNOSIS:  Complex regional pain syndrome I, unspecified [G90.50]  Pain in left foot [M79.672]   DATE OF ONSET: sp sesmoidectomy 17 with 6 mos hx of on/off pain prior to surgery   REFERRING PHYSICIAN: Ariela Ramirez MD  RETURN PHYSICIAN APPOINTMENT: TBD     INITIAL ASSESSMENT:  Ms. Jose Wharton presents with decreased strength R LE, decreased ROM, pain, and swelling L foot. Her symptoms are consistent with right s/p sesamoidectomy and complex regional pain syndrome. She has a high level of irritability and pain. She reports frequent episodes of foot swelling, pain, trophic changes with insidious onset. She will benefit from PT serivces with focus on pain control and de-sensitization so she can return to PLOF. PROBLEM LIST (Impacting functional limitations):  1. Decreased Strength  2. Decreased ADL/Functional Activities  3. Decreased Ambulation Ability/Technique  4. Decreased Balance  5. Increased Pain  6. Decreased Flexibility/Joint Mobility INTERVENTIONS PLANNED:  1. Balance Exercise  2. Cold  3. Cryotherapy  4. Electrical Stimulation  5. Gait Training  6. Heat  7. Home Exercise Program (HEP)  8. Manual Therapy  9. Range of Motion (ROM)  10. Therapeutic Activites  11. Therapeutic Exercise/Strengthening   12. Aquatics   TREATMENT PLAN:  Effective Dates: 18 TO 4/3/18. Frequency/Duration: 2-3 times a week for 12 weeks  GOALS: (Goals have been discussed and agreed upon with patient.)  Short-Term Functional Goals: Time Frame: 2 weeks   1.  Patient will be independent with HEP without pain. 2. Patient will have improved gait pattern WNL wearing gym shoes with cushion and rigid base, allowing her to ambulate 10 minutes without pain. 3. Patient will have improved SLB to 30 seconds without pain improving intrinsic foot strength. Discharge Goals: Time Frame: 12 weeks  1. Patient will have improved LEFS to > 60/80 allowing her to return to community participation without pain. 2. Patient will have improved mmt to 5-/5 B LE allowing her to progress to ascending/descending stairs without pain. 3. 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 Rehabilitation Potential For Stated Goals: Good                HISTORY:   History of Present Injury/Illness (Reason for Referral):  22 y/o F with hx of left foot pain at the ball of her foot. She has been diagnosed with sesamoiditis and was making progress with PT services but had a sudden severe increase in pain and swelling and had a left sesamoidectomy on 11/29/17. She continues to have pain left foot 8/10 intensity daily. She has trophic changes and has been diagnosed with CRPS. She has easy flare ups without   Past Medical History/Comorbidities:   Ms. Katrina Higuera  has no past medical history on file. Ms. Katrina Higuera  has a past surgical history that includes hx orthopaedic (Left, age 17) and hx heent. Social History/Living Environment:     Independent, lives on campus with roommates   Prior Level of Function/Work/Activity:  Independent with running and Equestrian  Previous Treatment Approaches:          Prior PT, she never got her prescribed orthotics   Current Medications:    Current Outpatient Prescriptions:     Lisdexamfetamine (VYVANSE) 50 mg cap, Take 50 mg by mouth every morning.   \"for anxiety\", Disp: , Rfl:    Date Last Reviewed:  1/19/2018   EXAMINATION:   Observation/Orthostatic Postural Assessment:          Left foot purple coloration (especially 1st digit), scar is clean and healed, toe nail dry and cracking, cracking skin lateral foot   Palpation:          Sensitive with light touch left plantar surface   ROM:     AROM/PROM  DF R 20/25 L 10/15  PF R 45/60 L 15/20   IN R 30/40 L 15/20  EV R 20/25 L 5/10   First MTP flexion R 45/50 L 10/15 deg with pain  First MTP extension R 45/60 deg L 5/10 deg with pain       Strength:     mmt  Hip abduction 4/5 B  Hip extension 4/5 B  Ankle DF R 5/5 L 4-/5  Ankle PF R 5/5 L 4-/5  Ankle IN R 5/5 L 4-/5  Ankle EV R 5/5 L 4-/5       Special Tests:          n/a  Neurological Screen:        Sensation: llight touch intact   Functional Mobility:         Gait/Ambulation:  Immobilizer boot left ankle with increased L LE ER with stance and decreased hip extension   Balance:          SLB unable L LE with pain R good    Body Structures Involved:  1. Bones  2. Joints  3. Muscles Body Functions Affected:  1. Neuromusculoskeletal  2. Movement Related Activities and Participation Affected:  1. General Tasks and Demands  2. Mobility  3. Self Care  4. Community, Social and Civic Life   CLINICAL PRESENTATION:   CLINICAL DECISION MAKING:   Outcome Measure: Tool Used: Lower Extremity Functional Scale (LEFS)  Score:  Initial: 33/80 Most Recent: X/80 (Date: -- )   Interpretation of Score: 20 questions each scored on a 5 point scale with 0 representing \"extreme difficulty or unable to perform\" and 4 representing \"no difficulty\". The lower the score, the greater the functional disability. 80/80 represents no disability. Minimal detectable change is 9 points. Score 80 79-63 62-48 47-32 31-16 15-1 0   Modifier CH CI CJ CK CL CM CN       · Patient is expected to demonstrate progress in strength, range of motion, balance, coordination and functional technique to increase independence with with gait. Reason for Services/Other Comments:  · Patient has been observed to have decreased strength and ROM before, during or after an intervention. TREATMENT:   (In addition to Assessment/Re-Assessment sessions the following treatments were rendered)  THERAPEUTIC EXERCISE: (see grid for minutes):  Exercises per grid below to improve mobility and strength. Required moderate visual, verbal, manual and tactile cues to promote proper body alignment, promote proper body posture and promote proper body mechanics. Progressed resistance, range and repetitions as indicated. MANUAL THERAPY: (see grid for minutes): Joint mobilization and Soft tissue mobilization was utilized and necessary because of the patient's restricted joint motion, painful spasm and loss of articular motion. MODALITIES: (see grid for minutes): *  Ultrasound was used today secondary to the patient having symptomatic soft tissue calcification. Ultrasound was used today to reduce pain, reduce spasm, reduce joint stiffness, increase muscle flexibility, increase tendon flexibility and increase ligament flexibility. *  Iontophoresis was used today secondary to the patient having thick adhesive scars and was used for the topical delivery of dexa into the patient's to reduce pain. *  Electrical Stimulation Therapy (IFC) was provided with intensity adjusted throughout treatment to patient tolerance. to reduce pain       *  Cold Pack Therapy in order to provide analgesia and reduce inflammation and edema. *  Hot Pack Therapy in order to relieve muscle spasm.      Date: 1/11/18 1/16/18  (visit 2) 1/19/18  (visit 3)     Modalities: 8 min 8 mins 8 min      US with keto 8 min 1 mhz x 1.2 ntensity L PF surface around sesmoid 8' 1 mhx x 1.5 intensity with keto repeat                     Therapeutic Exercise: 10 mins       Towel crunches and inv/ev 1' each       Samish board 4 dir 30x each       Long sit Ankle pumps with mirror 1'       [place]  1'       edu on HEP  D1/D2 with TB, PROM toe fexion/extension using mirror repeat             Proprioceptive Activities: Manual Therapy:  10 mis 10 min     Joints stabs   D1 and D2 3x10 each  repeat             Aquatic therapy:  45 mins 45 mins     Deep Well Jogging  30'' running, backward frog, side crab, scissor, jacks 30' running, backward frog, side crab, scissor, jacks, punches with belt, weights UE press downs without belt     Gait 4 dir  3 laps each  3 laps each      FIn at the end of pool  5x1' 5x1'     SLB with alternate UE ABD and SLB L LE with hip opener rotation to right (hip ER)  3x10 with L LE SLB, 30x ER with paddles resistance Repeat with paddle resistance 30x each      HEP: Provided HEP handout on 1/11/18  Treatment/Session Assessment:  She had improved activity tolerance and intrinsic foot strength today. She is progressing strength and ROM without increased pain. Con't with POC with aquatics at next visi.t     · Pain/ Symptoms: Initial:   5/10  \"I was a little sore after last session. I am in a little pain today. I feel stronger\" Post Session:  5/10 ·   Compliance with Program/Exercises: Will assess as treatment progresses. · Recommendations/Intent for next treatment session: \"Next visit will focus on advancements to more challenging activities\". Aquatics next visit.    Total Treatment Duration:  PT Patient Time In/Time Out  Time In: 1400  Time Out: [address] [facility] [address], PT

## 2018-01-23 ENCOUNTER — HOSPITAL ENCOUNTER (OUTPATIENT)
Dept: PHYSICAL THERAPY | Age: 23
Discharge: HOME | End: 2018-01-23
Payer: COMMERCIAL

## 2018-01-23 PROCEDURE — 97140 MANUAL THERAPY 1/> REGIONS: CPT

## 2018-01-23 PROCEDURE — 97113 AQUATIC THERAPY/EXERCISES: CPT

## 2018-01-23 PROCEDURE — 97035 APP MDLTY 1+ULTRASOUND EA 15: CPT

## 2018-01-23 NOTE — PROGRESS NOTES
[facility]   (:1995) [address], [address] [place] [address]  [phone]   [phone]           PHYSICIAN COMMUNICATION and progress note    REFERRING PHYSICIAN: Rodrigo Linda MD  Return Physician Appointment: 18  MEDICAL/REFERRING DIAGNOSIS:  · Complex regional pain syndrome I, unspecified [G90.50]  · Pain in left foot [M79.672]  ATTENDANCE: [facility] has attended 4 out of 4 visits, with 0 cancellation(s) and 0 no shows. ASSESSMENT:  DATE: 2018    PROGRESS: [facility] has made steady improvements with strength and active range of motion. She reports decreased episodes of swelling, temperature changes, and color changes in foot. She continues to have pain specific to sesamoid and 1st MT location. Her PT POC has focused on mirror biofeedback, graded exercise exposure and aerobics in the aquatic setting, and intrinsic foot muscle strengthening. She will be transitioned out of the pool soon to focus on proprioception and WB. RECOMMENDATIONS: F/u with orthopedic to consult custom orthotics so she can transition out of her immobilizer boot,     Thank you for this referral,  Adama Saavedra, PT     Referring Physician Signature:  Rodrigo Linda MD          Date

## 2018-01-23 NOTE — PROGRESS NOTES
REHABILITATION [facility]  : 1995 [facility] [address]  [address] [facility] [place]  [phone]   [phone]        OUTPATIENT PHYSICAL THERAPY:Daily Note 2018        ICD-10: Treatment Diagnosis: Pain in left foot (M79.672)Effusion, left foot (M25.475)  Precautions/Allergies:   Erythromycin   Fall Risk Score: 0 (? 5 = High Risk)  MD Orders: Evaluation and treatment  MEDICAL/REFERRING DIAGNOSIS:  Complex regional pain syndrome I, unspecified [G90.50]  Pain in left foot [M79.672]   DATE OF ONSET: sp sesmoidectomy 19 with 6 mos hx of on/off pain prior to surgery   REFERRING PHYSICIAN: Joann Huizar MD  RETURN PHYSICIAN APPOINTMENT: TBD     INITIAL ASSESSMENT:  Ms. Salty Driscoll presents with decreased strength R LE, decreased ROM, pain, and swelling L foot. Her symptoms are consistent with right s/p sesamoidectomy and complex regional pain syndrome. She has a high level of irritability and pain. She reports frequent episodes of foot swelling, pain, trophic changes with insidious onset. She will benefit from PT serivces with focus on pain control and de-sensitization so she can return to [facility]. PROBLEM LIST (Impacting functional limitations):  1. Decreased Strength  2. Decreased ADL/Functional Activities  3. Decreased Ambulation Ability/Technique  4. Decreased Balance  5. Increased Pain  6. Decreased Flexibility/Joint Mobility INTERVENTIONS PLANNED:  1. Balance Exercise  2. Cold  3. Cryotherapy  4. Electrical Stimulation  5. Gait Training  6. Heat  7. Home Exercise Program (HEP)  8. Manual Therapy  9. Range of Motion (ROM)  10. Therapeutic Activites  11. Therapeutic Exercise/Strengthening   12. Aquatics   TREATMENT PLAN:  Effective Dates: 18 TO 4/3/18. Frequency/Duration: 2-3 times a week for 12 weeks  GOALS: (Goals have been discussed and agreed upon with patient.)  Short-Term Functional Goals: Time Frame: 2 weeks   1.  Patient will be independent with HEP without pain. 2. Patient will have improved gait pattern WNL wearing gym shoes with cushion and rigid base, allowing her to ambulate 10 minutes without pain. 3. Patient will have improved SLB to 30 seconds without pain improving intrinsic foot strength. Discharge Goals: Time Frame: 12 weeks  1. Patient will have improved LEFS to > 60/80 allowing her to return to community participation without pain. 2. Patient will have improved mmt to 5-/5 B LE allowing her to progress to ascending/descending stairs without pain. 3. 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 Rehabilitation Potential For Stated Goals: Good                HISTORY:   History of Present Injury/Illness (Reason for Referral):  24 y/o F with hx of left foot pain at the ball of her foot. She has been diagnosed with sesamoiditis and was making progress with PT services but had a sudden severe increase in pain and swelling and had a left sesamoidectomy on 11/29/17. She continues to have pain left foot 8/10 intensity daily. She has trophic changes and has been diagnosed with CRPS. She has easy flare ups without   Past Medical History/Comorbidities:   Ms. Jax Peters  has no past medical history on file. Ms. Jax Peters  has a past surgical history that includes hx orthopaedic (Left, age 17) and hx heent. Social History/Living Environment:     Independent, lives on campus with roommates   Prior Level of Function/Work/Activity:  Independent with running and Equestrian  Previous Treatment Approaches:          Prior PT, she never got her prescribed orthotics   Current Medications:    Current Outpatient Prescriptions:     Lisdexamfetamine (VYVANSE) 50 mg cap, Take 50 mg by mouth every morning.   \"for anxiety\", Disp: , Rfl:    Date Last Reviewed:  1/23/2018   EXAMINATION:   Observation/Orthostatic Postural Assessment:          Left foot purple coloration (especially 1st digit), scar is clean and healed, toe nail dry and cracking, cracking skin lateral foot   Palpation:          Sensitive with light touch left plantar surface   ROM:     AROM/PROM  DF R 20/25 L 10/15  PF R 45/60 L 15/20   IN R 30/40 L 15/20  EV R 20/25 L 5/10   First MTP flexion R 45/50 L 10/15 deg with pain  First MTP extension R 45/60 deg L 5/10 deg with pain    1/23/18  AROM and PROM WNL L DF/VT/IN/EV/first ray MTP ext and flexion   Strength:     mmt  Hip abduction 4/5 B  Hip extension 4/5 B  Ankle DF R 5/5 L 4-/5  Ankle PF R 5/5 L 4-/5  Ankle IN R 5/5 L 4-/5  Ankle EV R 5/5 L 4-/5    1/23/17 mmt  Ankle DF L 4+/5  Ankle PF L (mmt not WB testing)4+/5  Ankle IN L 4+/5  Ankle EV L 4+/5   Special Tests:          n/a  Neurological Screen:        Sensation: llight touch intact   Functional Mobility:         Gait/Ambulation:  Immobilizer boot left ankle with increased L LE ER with stance and decreased hip extension   Balance:          SLB unable L LE with pain R good    Body Structures Involved:  1. Bones  2. Joints  3. Muscles Body Functions Affected:  1. Neuromusculoskeletal  2. Movement Related Activities and Participation Affected:  1. General Tasks and Demands  2. Mobility  3. Self Care  4. Community, Social and Civic Life   CLINICAL PRESENTATION:   CLINICAL DECISION MAKING:   Outcome Measure: Tool Used: Lower Extremity Functional Scale (LEFS)  Score:  Initial: 33/80 Most Recent: X/80 (Date: -- )   Interpretation of Score: 20 questions each scored on a 5 point scale with 0 representing \"extreme difficulty or unable to perform\" and 4 representing \"no difficulty\". The lower the score, the greater the functional disability. 80/80 represents no disability. Minimal detectable change is 9 points.   Score 80 79-63 62-48 47-32 31-16 15-1 0   Modifier CH CI CJ CK CL CM CN       · Patient is expected to demonstrate progress in strength, range of motion, balance, coordination and functional technique to increase independence with with gait. Reason for Services/Other Comments:  · Patient has been observed to have decreased strength and ROM before, during or after an intervention. TREATMENT:   (In addition to Assessment/Re-Assessment sessions the following treatments were rendered)  THERAPEUTIC EXERCISE: (see grid for minutes):  Exercises per grid below to improve mobility and strength. Required moderate visual, verbal, manual and tactile cues to promote proper body alignment, promote proper body posture and promote proper body mechanics. Progressed resistance, range and repetitions as indicated. MANUAL THERAPY: (see grid for minutes): Joint mobilization and Soft tissue mobilization was utilized and necessary because of the patient's restricted joint motion, painful spasm and loss of articular motion. MODALITIES: (see grid for minutes): *  Ultrasound was used today secondary to the patient having symptomatic soft tissue calcification. Ultrasound was used today to reduce pain, reduce spasm, reduce joint stiffness, increase muscle flexibility, increase tendon flexibility and increase ligament flexibility. *  Iontophoresis was used today secondary to the patient having thick adhesive scars and was used for the topical delivery of dexa into the patient's to reduce pain. *  Electrical Stimulation Therapy (IFC) was provided with intensity adjusted throughout treatment to patient tolerance. to reduce pain       *  Cold Pack Therapy in order to provide analgesia and reduce inflammation and edema. *  Hot Pack Therapy in order to relieve muscle spasm.      Date: 1/11/18 1/16/18  (visit 2) 1/19/18  (visit 3) 1/23/18  (visit 4)    Modalities: 8 min 8 mins 8 min  8 min    US with keto 8 min 1 mhz x 1.2 ntensity L PF surface around sesmoid 8' 1 mhx x 1.5 intensity with keto repeat repeat                    Therapeutic Exercise: 10 mins       Towel crunches and inv/ev 1' each       [place] board 4 dir 30x each Long sit Ankle pumps with mirror 1'       [place]  1'       edu on HEP  D1/D2 with TB, PROM toe fexion/extension using mirror repeat             Proprioceptive Activities:                                Manual Therapy:  10 mis 10 min 12 mins    Joints stabs   D1 and D2 3x10 each  repeat repeat    MFR intrinsics between MT and prox first ray PF mobs    5'    Aquatic therapy:  45 mins 45 mins 45 mins    Deep Well Jogging  30'' running, backward frog, side crab, scissor, jacks 30' running, backward frog, side crab, scissor, jacks, punches with belt, weights UE press downs without belt Repeat wit increased speed and using UE dumbells instead of jogging belt for support    Gait 4 dir  3 laps each  3 laps each  3 laps each    FIn at the end of pool  5x1' 5x1' 3'    Step ups    Forward and lateral 15x each L LE    SLB with alternate UE ABD and SLB L LE with hip opener rotation to right (hip ER)  3x10 with L LE SLB, 30x ER with paddles resistance Repeat with paddle resistance 30x each  Repeat + SLB L LE with R LE hip circles (4#) 3x1- eaech    HEP: Provided HEP handout on 1/11/18  Treatment/Session Assessment:  Overall she reports improvement in foot strength and ROM. She reports compliance with mirror feedback, intrinsic strength, and proprioception. She tolerated aquatic therapy without increased pain but improved strength and mobility. She will benefit from becoming independent with aquatic program and transitioning back to land PT after her next session. She may benefit from custom orthotics to provide a rigid cushioned support system for her left foot. · Pain/ Symptoms: Initial:   3/10 \"I had to stand a lot yesterday so I am in a little bit more pain today\" Post Session:  5/10 ·   Compliance with Program/Exercises: Will assess as treatment progresses. · Recommendations/Intent for next treatment session: \"Next visit will focus on advancements to more challenging activities\". Aquatics next visit. Total Treatment Duration:  PT Patient Time In/Time Out  Time In: 0757  Time Out: [address], PT

## 2018-01-26 ENCOUNTER — HOSPITAL ENCOUNTER (OUTPATIENT)
Dept: PHYSICAL THERAPY | Age: 23
Discharge: HOME | End: 2018-01-26
Payer: COMMERCIAL

## 2018-01-26 PROCEDURE — 97140 MANUAL THERAPY 1/> REGIONS: CPT

## 2018-01-26 PROCEDURE — 97113 AQUATIC THERAPY/EXERCISES: CPT

## 2018-01-26 NOTE — PROGRESS NOTES
REHABILITATION [facility]  : 1995 [facility] [address], [address] [place] [address]  [phone]   [phone]        OUTPATIENT PHYSICAL THERAPY:Daily Note 2018        ICD-10: Treatment Diagnosis: Pain in left foot (M79.672)Effusion, left foot (M25.475)  Precautions/Allergies:   Erythromycin   Fall Risk Score: 0 (? 5 = High Risk)  MD Orders: Evaluation and treatment  MEDICAL/REFERRING DIAGNOSIS:  Complex regional pain syndrome I, unspecified [G90.50]  Pain in left foot [M79.672]   DATE OF ONSET: sp sesmoidectomy 17 with 6 mos hx of on/off pain prior to surgery   REFERRING PHYSICIAN: Archie Villalta MD  RETURN PHYSICIAN APPOINTMENT: TBD     INITIAL ASSESSMENT:  Ms. Dafne Hull presents with decreased strength R LE, decreased ROM, pain, and swelling L foot. Her symptoms are consistent with right s/p sesamoidectomy and complex regional pain syndrome. She has a high level of irritability and pain. She reports frequent episodes of foot swelling, pain, trophic changes with insidious onset. She will benefit from PT serivces with focus on pain control and de-sensitization so she can return to [facility]. PROBLEM LIST (Impacting functional limitations):  1. Decreased Strength  2. Decreased ADL/Functional Activities  3. Decreased Ambulation Ability/Technique  4. Decreased Balance  5. Increased Pain  6. Decreased Flexibility/Joint Mobility INTERVENTIONS PLANNED:  1. Balance Exercise  2. Cold  3. Cryotherapy  4. Electrical Stimulation  5. Gait Training  6. Heat  7. Home Exercise Program (HEP)  8. Manual Therapy  9. Range of Motion (ROM)  10. Therapeutic Activites  11. Therapeutic Exercise/Strengthening   12. Aquatics   TREATMENT PLAN:  Effective Dates: 18 TO 4/3/18. Frequency/Duration: 2-3 times a week for 12 weeks  GOALS: (Goals have been discussed and agreed upon with patient.)  Short-Term Functional Goals: Time Frame: 2 weeks   1.  Patient will be independent with HEP without pain. 2. Patient will have improved gait pattern WNL wearing gym shoes with cushion and rigid base, allowing her to ambulate 10 minutes without pain. 3. Patient will have improved SLB to 30 seconds without pain improving intrinsic foot strength. Discharge Goals: Time Frame: 12 weeks  1. Patient will have improved LEFS to > 60/80 allowing her to return to community participation without pain. 2. Patient will have improved mmt to 5-/5 B LE allowing her to progress to ascending/descending stairs without pain. 3. 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 Rehabilitation Potential For Stated Goals: Good                HISTORY:   History of Present Injury/Illness (Reason for Referral):  24 y/o F with hx of left foot pain at the ball of her foot. She has been diagnosed with sesamoiditis and was making progress with PT services but had a sudden severe increase in pain and swelling and had a left sesamoidectomy on 11/29/17. She continues to have pain left foot 8/10 intensity daily. She has trophic changes and has been diagnosed with CRPS. She has easy flare ups without   Past Medical History/Comorbidities:   Ms. Salty Driscoll  has no past medical history on file. Ms. Salty Driscoll  has a past surgical history that includes hx orthopaedic (Left, age 17) and hx heent. Social History/Living Environment:     Independent, lives on campus with roommates   Prior Level of Function/Work/Activity:  Independent with running and Equestrian  Previous Treatment Approaches:          Prior PT, she never got her prescribed orthotics   Current Medications:    Current Outpatient Prescriptions:     Lisdexamfetamine (VYVANSE) 50 mg cap, Take 50 mg by mouth every morning.   \"for anxiety\", Disp: , Rfl:    Date Last Reviewed:  1/26/2018   EXAMINATION:   Observation/Orthostatic Postural Assessment:          Left foot purple coloration (especially 1st digit), scar is clean and healed, toe nail dry and cracking, cracking skin lateral foot   Palpation:          Sensitive with light touch left plantar surface   ROM:     AROM/PROM  DF R 20/25 L 10/15  PF R 45/60 L 15/20   IN R 30/40 L 15/20  EV R 20/25 L 5/10   First MTP flexion R 45/50 L 10/15 deg with pain  First MTP extension R 45/60 deg L 5/10 deg with pain    1/23/18  AROM and PROM WNL L DF/VT/IN/EV/first ray MTP ext and flexion   Strength:     mmt  Hip abduction 4/5 B  Hip extension 4/5 B  Ankle DF R 5/5 L 4-/5  Ankle PF R 5/5 L 4-/5  Ankle IN R 5/5 L 4-/5  Ankle EV R 5/5 L 4-/5    1/23/17 mmt  Ankle DF L 4+/5  Ankle PF L (mmt not WB testing)4+/5  Ankle IN L 4+/5  Ankle EV L 4+/5   Special Tests:          n/a  Neurological Screen:        Sensation: llight touch intact   Functional Mobility:         Gait/Ambulation:  Immobilizer boot left ankle with increased L LE ER with stance and decreased hip extension   Balance:          SLB unable L LE with pain R good    Body Structures Involved:  1. Bones  2. Joints  3. Muscles Body Functions Affected:  1. Neuromusculoskeletal  2. Movement Related Activities and Participation Affected:  1. General Tasks and Demands  2. Mobility  3. Self Care  4. Community, Social and Civic Life   CLINICAL PRESENTATION:   CLINICAL DECISION MAKING:   Outcome Measure: Tool Used: Lower Extremity Functional Scale (LEFS)  Score:  Initial: 33/80 Most Recent: X/80 (Date: -- )   Interpretation of Score: 20 questions each scored on a 5 point scale with 0 representing \"extreme difficulty or unable to perform\" and 4 representing \"no difficulty\". The lower the score, the greater the functional disability. 80/80 represents no disability. Minimal detectable change is 9 points.   Score 80 79-63 62-48 47-32 31-16 15-1 0   Modifier CH CI CJ CK CL CM CN       · Patient is expected to demonstrate progress in strength, range of motion, balance, coordination and functional technique to increase independence with with gait. Reason for Services/Other Comments:  · Patient has been observed to have decreased strength and ROM before, during or after an intervention. TREATMENT:   (In addition to Assessment/Re-Assessment sessions the following treatments were rendered)  THERAPEUTIC EXERCISE: (see grid for minutes):  Exercises per grid below to improve mobility and strength. Required moderate visual, verbal, manual and tactile cues to promote proper body alignment, promote proper body posture and promote proper body mechanics. Progressed resistance, range and repetitions as indicated. MANUAL THERAPY: (see grid for minutes): Joint mobilization and Soft tissue mobilization was utilized and necessary because of the patient's restricted joint motion, painful spasm and loss of articular motion. MODALITIES: (see grid for minutes): *  Ultrasound was used today secondary to the patient having symptomatic soft tissue calcification. Ultrasound was used today to reduce pain, reduce spasm, reduce joint stiffness, increase muscle flexibility, increase tendon flexibility and increase ligament flexibility. *  Iontophoresis was used today secondary to the patient having thick adhesive scars and was used for the topical delivery of dexa into the patient's to reduce pain. *  Electrical Stimulation Therapy (IFC) was provided with intensity adjusted throughout treatment to patient tolerance. to reduce pain       *  Cold Pack Therapy in order to provide analgesia and reduce inflammation and edema. *  Hot Pack Therapy in order to relieve muscle spasm.      Date: 1/11/18 1/16/18  (visit 2) 1/19/18  (visit 3) 1/23/18  (visit 4) 1/26/18  visit 5)   Modalities: 8 min 8 mins 8 min  8 min    US with keto 8 min 1 mhz x 1.2 ntensity L PF surface around sesmoid 8' 1 mhx x 1.5 intensity with keto repeat repeat                    Therapeutic Exercise: 10 mins       Towel crunches and inv/ev 1' each       [place] board 4 dir 30x each       Long sit Ankle pumps with mirror 1'       [place]  1'       edu on HEP  D1/D2 with TB, PROM toe fexion/extension using mirror repeat             Proprioceptive Activities:                                Manual Therapy:  10 mis 10 min 12 mins 15 min   Joints stabs   D1 and D2 3x10 each  repeat repeat Scar massage    MFR intrinsics between MT and prox first ray PF mobs    5' repeat   Aquatic therapy:  45 mins 45 mins 45 mins 45 mins   Deep Well Jogging  30'' running, backward frog, side crab, scissor, jacks 30' running, backward frog, side crab, scissor, jacks, punches with belt, weights UE press downs without belt Repeat wit increased speed and using UE dumbells instead of jogging belt for support 3 laps each with 3# ankle weights and belt, 2 laps with DB weights in arms and not ankle weights   Gait 4 dir  3 laps each  3 laps each  3 laps each 3 laps each    FIn at the end of pool  5x1' 5x1' 3' Fig 4 stretch, gastroc stretch [id number] sec holds each    Step ups    Forward and lateral 15x each L LE 15x each   SLB with alternate UE ABD and SLB L LE with hip opener rotation to right (hip ER)  3x10 with L LE SLB, 30x ER with paddles resistance Repeat with paddle resistance 30x each  Repeat + SLB L LE with R LE hip circles (4#) 3x1- eaech 10x each   HEP: Provided HEP handout on 1/11/18  Treatment/Session Assessment:  She is making steady improvement and is tolerating graded exercise progression and exposure without severe flare ups. Transition to land at next visit. · Pain/ Symptoms: Initial:   1/10 \"I did the elliptical and some single leg strengthening and balance and I feel okay. I started moving my foot in a bucket of beans and rice and my foot will flare up during it\" Post Session:  1/10 ·   Compliance with Program/Exercises: Will assess as treatment progresses. · Recommendations/Intent for next treatment session:  \"Next visit will focus on advancements to more challenging activities\".    Total Treatment Duration:  PT Patient Time In/Time Out  Time In: 1400  Time Out: [address] [facility]

## 2018-01-29 NOTE — PROGRESS NOTES
REHABILITATION [facility]  : 1995 [facility] [address]  [address] [place] [address]  [phone]   [phone]        OUTPATIENT PHYSICAL THERAPY:Daily Note 2018        ICD-10: Treatment Diagnosis: Pain in left foot (M79.672)Effusion, left foot (M25.475)  Precautions/Allergies:   Erythromycin   Fall Risk Score: 0 (? 5 = High Risk)  MD Orders: Evaluation and treatment  MEDICAL/REFERRING DIAGNOSIS:  Complex regional pain syndrome I, unspecified [G90.50]  Pain in left foot [M79.672]   DATE OF ONSET: sp sesmoidectomy 19 with 6 mos hx of on/off pain prior to surgery   REFERRING PHYSICIAN: Tor Montanez MD  RETURN PHYSICIAN APPOINTMENT: TBD     INITIAL ASSESSMENT:  Ms. Arden Skiff presents with decreased strength R LE, decreased ROM, pain, and swelling L foot. Her symptoms are consistent with right s/p sesamoidectomy and complex regional pain syndrome. She has a high level of irritability and pain. She reports frequent episodes of foot swelling, pain, trophic changes with insidious onset. She will benefit from PT serivces with focus on pain control and de-sensitization so she can return to PLOF. PROBLEM LIST (Impacting functional limitations):  1. Decreased Strength  2. Decreased ADL/Functional Activities  3. Decreased Ambulation Ability/Technique  4. Decreased Balance  5. Increased Pain  6. Decreased Flexibility/Joint Mobility INTERVENTIONS PLANNED:  1. Balance Exercise  2. Cold  3. Cryotherapy  4. Electrical Stimulation  5. Gait Training  6. Heat  7. Home Exercise Program (HEP)  8. Manual Therapy  9. Range of Motion (ROM)  10. Therapeutic Activites  11. Therapeutic Exercise/Strengthening   12. Aquatics   TREATMENT PLAN:  Effective Dates: 18 TO 4/3/18. Frequency/Duration: 2-3 times a week for 12 weeks  GOALS: (Goals have been discussed and agreed upon with patient.)  Short-Term Functional Goals: Time Frame: 2 weeks   1.  Patient will be independent with HEP without pain. 2. Patient will have improved gait pattern WNL wearing gym shoes with cushion and rigid base, allowing her to ambulate 10 minutes without pain. 3. Patient will have improved SLB to 30 seconds without pain improving intrinsic foot strength. Discharge Goals: Time Frame: 12 weeks  1. Patient will have improved LEFS to > 60/80 allowing her to return to community participation without pain. 2. Patient will have improved mmt to 5-/5 B LE allowing her to progress to ascending/descending stairs without pain. 3. 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 Rehabilitation Potential For Stated Goals: Good                HISTORY:   History of Present Injury/Illness (Reason for Referral):  22 y/o F with hx of left foot pain at the ball of her foot. She has been diagnosed with sesamoiditis and was making progress with PT services but had a sudden severe increase in pain and swelling and had a left sesamoidectomy on 11/29/17. She continues to have pain left foot 8/10 intensity daily. She has trophic changes and has been diagnosed with CRPS. She has easy flare ups without   Past Medical History/Comorbidities:   Ms. Kesha Ferrara  has no past medical history on file. Ms. Kesha Ferrara  has a past surgical history that includes hx orthopaedic (Left, age 17) and hx heent. Social History/Living Environment:     Independent, [place] on campus with roommates   Prior Level of Function/Work/Activity:  Independent with running and Equestrian  Previous Treatment Approaches:          Prior PT, she never got her prescribed orthotics   Current Medications:    Current Outpatient Prescriptions:     Lisdexamfetamine (VYVANSE) 50 mg cap, Take 50 mg by mouth every morning.   \"for anxiety\", Disp: , Rfl:    Date Last Reviewed:  1/30/2018   EXAMINATION:   Observation/Orthostatic Postural Assessment:          Left foot purple coloration (especially 1st digit), scar is clean and healed, toe nail dry and cracking, cracking skin lateral foot   Palpation:          Sensitive with light touch left plantar surface   ROM:     AROM/PROM  DF R 20/25 L 10/15  PF R 45/60 L 15/20   IN R 30/40 L 15/20  EV R 20/25 L 5/10   First MTP flexion R 45/50 L 10/15 deg with pain  First MTP extension R 45/60 deg L 5/10 deg with pain    1/23/18  AROM and PROM WNL L DF/AL/IN/EV/first ray MTP ext and flexion   Strength:     mmt  Hip abduction 4/5 B  Hip extension 4/5 B  Ankle DF R 5/5 L 4-/5  Ankle PF R 5/5 L 4-/5  Ankle IN R 5/5 L 4-/5  Ankle EV R 5/5 L 4-/5    1/23/17 mmt  Ankle DF L 4+/5  Ankle PF L (mmt not WB testing)4+/5  Ankle IN L 4+/5  Ankle EV L 4+/5   Special Tests:          n/a  Neurological Screen:        Sensation: llight touch intact   Functional Mobility:         Gait/Ambulation:  Immobilizer boot left ankle with increased L LE ER with stance and decreased hip extension   Balance:          SLB unable L LE with pain R good    Body Structures Involved:  1. Bones  2. Joints  3. Muscles Body Functions Affected:  1. Neuromusculoskeletal  2. Movement Related Activities and Participation Affected:  1. General Tasks and Demands  2. Mobility  3. Self Care  4. Community, Social and Civic Life   CLINICAL PRESENTATION:   CLINICAL DECISION MAKING:   Outcome Measure: Tool Used: Lower Extremity Functional Scale (LEFS)  Score:  Initial: 33/80 Most Recent: X/80 (Date: -- )   Interpretation of Score: 20 questions each scored on a 5 point scale with 0 representing \"extreme difficulty or unable to perform\" and 4 representing \"no difficulty\". The lower the score, the greater the functional disability. 80/80 represents no disability. Minimal detectable change is 9 points.   Score 80 79-63 62-48 47-32 31-16 15-1 0   Modifier CH CI CJ CK CL CM CN       · Patient is expected to demonstrate progress in strength, range of motion, balance, coordination and functional technique to increase independence with with gait. Reason for Services/Other Comments:  · Patient has been observed to have decreased strength and ROM before, during or after an intervention. TREATMENT:   (In addition to Assessment/Re-Assessment sessions the following treatments were rendered)  THERAPEUTIC EXERCISE: (see grid for minutes):  Exercises per grid below to improve mobility and strength. Required moderate visual, verbal, manual and tactile cues to promote proper body alignment, promote proper body posture and promote proper body mechanics. Progressed resistance, range and repetitions as indicated. MANUAL THERAPY: (see grid for minutes): Joint mobilization and Soft tissue mobilization was utilized and necessary because of the patient's restricted joint motion, painful spasm and loss of articular motion. MODALITIES: (see grid for minutes): *  Ultrasound was used today secondary to the patient having symptomatic soft tissue calcification. Ultrasound was used today to reduce pain, reduce spasm, reduce joint stiffness, increase muscle flexibility, increase tendon flexibility and increase ligament flexibility. *  Iontophoresis was used today secondary to the patient having thick adhesive scars and was used for the topical delivery of dexa into the patient's to reduce pain. *  Electrical Stimulation Therapy (IFC) was provided with intensity adjusted throughout treatment to patient tolerance. to reduce pain       *  Cold Pack Therapy in order to provide analgesia and reduce inflammation and edema. *  Hot Pack Therapy in order to relieve muscle spasm.      Date: 1/11/18 1/16/18  (visit 2) 1/19/18  (visit 3) 1/23/18  (visit 4) 1/26/18  visit 5) 1/29/18  (visit 6)   Modalities: 8 min 8 mins 8 min  8 min  10 mins   US with keto 8 min 1 mhz x 1.2 ntensity L PF surface around sesmoid 8' 1 mhx x 1.5 intensity with keto repeat repeat     Whirlpool cold then warm      5' each with ankle pumps Therapeutic Exercise: 10 mins     30 min    Towel crunches and inv/ev 1' each     TB side steps with TB on ankles 3 laps x 20 ft B   [place] board 4 dir 30x each     Slider rettro lunges 3x10 B   Long sit Ankle pumps with mirror 1'     Wall falls with heel down for eccentric gastroc 3x10    [place]  1'     SLB with TB HABD 3x10 B UE   edu on HEP  D1/D2 with TB, PROM toe fexion/extension using mirror repeat   Rockboard series 20x/30 sec balance with head turn, and SL diagnonals 20x each dir   Small circleboard      10x3 each dir with alt foot on ground   Proprioceptive Activities:                                    Manual Therapy:  10 mis 10 min 12 mins 15 min 8 mins   Joints stabs   D1 and D2 3x10 each  repeat repeat Scar massage  repeat   MFR intrinsics between MT and prox first ray PF mobs    5' repeat repeat   Aquatic therapy:  45 mins 45 mins 45 mins 45 mins    Deep Well Jogging  30'' running, backward frog, side crab, scissor, jacks 30' running, backward frog, side crab, scissor, jacks, punches with belt, weights UE press downs without belt Repeat wit increased speed and using UE dumbells instead of jogging belt for support 3 laps each with 3# ankle weights and belt, 2 laps with DB weights in arms and not ankle weights    Gait 4 dir  3 laps each  3 laps each  3 laps each 3 laps each     FIn at the end of pool  5x1' 5x1' 3' Fig 4 stretch, gastroc stretch [id number] sec holds each     Step ups    Forward and lateral 15x each L LE 15x each    SLB with alternate UE ABD and SLB L LE with hip opener rotation to right (hip ER)  3x10 with L LE SLB, 30x ER with paddles resistance Repeat with paddle resistance 30x each  Repeat + SLB L LE with R LE hip circles (4#) 3x1- eaech 10x each    HEP: Provided HEP handout on 1/11/18  Treatment/Session Assessment:  She tolerated ankle stability and gluteal stability strengthening without pain. Con't with POC.       · Pain/ Symptoms: Initial:   0/10 \"I am more active and I am having less flare ups. I was able to wear my gym shoe most of the weekend\" Post Session:  0/10 No increased ·   Compliance with Program/Exercises: Will assess as treatment progresses. · Recommendations/Intent for next treatment session: \"Next visit will focus on advancements to more challenging activities\".    Total Treatment Duration:  PT Patient Time In/Time Out  Time In: 0806  Time Out: 75[address] Jules Dave, PT

## 2018-01-30 ENCOUNTER — HOSPITAL ENCOUNTER (OUTPATIENT)
Dept: PHYSICAL THERAPY | Age: 23
Discharge: HOME | End: 2018-01-30
Payer: COMMERCIAL

## 2018-01-30 PROCEDURE — 97110 THERAPEUTIC EXERCISES: CPT

## 2018-01-30 PROCEDURE — 97140 MANUAL THERAPY 1/> REGIONS: CPT

## 2018-02-01 ENCOUNTER — HOSPITAL ENCOUNTER (OUTPATIENT)
Dept: PHYSICAL THERAPY | Age: 23
Discharge: HOME | End: 2018-02-01
Payer: SELF-PAY

## 2018-02-01 PROCEDURE — 97110 THERAPEUTIC EXERCISES: CPT

## 2018-02-01 PROCEDURE — 97140 MANUAL THERAPY 1/> REGIONS: CPT

## 2018-02-01 NOTE — PROGRESS NOTES
REHABILITATION [facility]  : 1995 [facility] [address]  [address] [place] [address]  [phone]   [phone]        OUTPATIENT PHYSICAL THERAPY:Daily Note 2018        ICD-10: Treatment Diagnosis: Pain in left foot (M79.672)Effusion, left foot (M25.475)  Precautions/Allergies:   Erythromycin   Fall Risk Score: 0 (? 5 = High Risk)  MD Orders: Evaluation and treatment  MEDICAL/REFERRING DIAGNOSIS:  Complex regional pain syndrome I, unspecified [G90.50]  Pain in left foot [M79.672]   DATE OF ONSET: sp sesmoidectomy 17 with 6 mos hx of on/off pain prior to surgery   REFERRING PHYSICIAN: Amy Royal MD  RETURN PHYSICIAN APPOINTMENT: TBD     INITIAL ASSESSMENT:  Ms. Ander Gonzáles presents with decreased strength R LE, decreased ROM, pain, and swelling L foot. Her symptoms are consistent with right s/p sesamoidectomy and complex regional pain syndrome. She has a high level of irritability and pain. She reports frequent episodes of foot swelling, pain, trophic changes with insidious onset. She will benefit from PT serivces with focus on pain control and de-sensitization so she can return to OF. PROBLEM LIST (Impacting functional limitations):  1. Decreased Strength  2. Decreased ADL/Functional Activities  3. Decreased Ambulation Ability/Technique  4. Decreased Balance  5. Increased Pain  6. Decreased Flexibility/Joint Mobility INTERVENTIONS PLANNED:  1. Balance Exercise  2. Cold  3. Cryotherapy  4. Electrical Stimulation  5. Gait Training  6. Heat  7. Home Exercise Program (HEP)  8. Manual Therapy  9. Range of Motion (ROM)  10. Therapeutic Activites  11. Therapeutic Exercise/Strengthening   12. Aquatics   TREATMENT PLAN:  Effective Dates: 18 TO 4/3/18. Frequency/Duration: 2-3 times a week for 12 weeks  GOALS: (Goals have been discussed and agreed upon with patient.)  Short-Term Functional Goals: Time Frame: 2 weeks   1.  Patient will be independent with HEP without pain. 2. Patient will have improved gait pattern WNL wearing gym shoes with cushion and rigid base, allowing her to ambulate 10 minutes without pain. 3. Patient will have improved SLB to 30 seconds without pain improving intrinsic foot strength. Discharge Goals: Time Frame: 12 weeks  1. Patient will have improved LEFS to > 60/80 allowing her to return to community participation without pain. 2. Patient will have improved mmt to 5-/5 B LE allowing her to progress to ascending/descending stairs without pain. 3. 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 Rehabilitation Potential For Stated Goals: Good                HISTORY:   History of Present Injury/Illness (Reason for Referral):  24 y/o F with hx of left foot pain at the ball of her foot. She has been diagnosed with sesamoiditis and was making progress with PT services but had a sudden severe increase in pain and swelling and had a left sesamoidectomy on 11/29/17. She continues to have pain left foot 8/10 intensity daily. She has trophic changes and has been diagnosed with CRPS. She has easy flare ups without   Past Medical History/Comorbidities:   Ms. Kesha Ferrara  has no past medical history on file. Ms. Kesha Ferrara  has a past surgical history that includes hx orthopaedic (Left, age 17) and hx heent. Social History/Living Environment:     Independent, lives on campus with roommates   Prior Level of Function/Work/Activity:  Independent with running and Equestrian  Previous Treatment Approaches:          Prior PT, she never got her prescribed orthotics   Current Medications:    Current Outpatient Prescriptions:     Lisdexamfetamine (VYVANSE) 50 mg cap, Take 50 mg by mouth every morning.   \"for anxiety\", Disp: , Rfl:    Date Last Reviewed:  2/1/2018   EXAMINATION:   Observation/Orthostatic Postural Assessment:          Left foot purple coloration (especially 1st digit), scar is clean and healed, toe nail dry and cracking, cracking skin lateral foot   Palpation:          Sensitive with light touch left plantar surface   ROM:     AROM/PROM  DF R 20/25 L 10/15  PF R 45/60 L 15/20   IN R 30/40 L 15/20  EV R 20/25 L 5/10   First MTP flexion R 45/50 L 10/15 deg with pain  First MTP extension R 45/60 deg L 5/10 deg with pain    1/23/18  AROM and PROM WNL L DF/ME/IN/EV/first ray MTP ext and flexion   Strength:     mmt  Hip abduction 4/5 B  Hip extension 4/5 B  Ankle DF R 5/5 L 4-/5  Ankle PF R 5/5 L 4-/5  Ankle IN R 5/5 L 4-/5  Ankle EV R 5/5 L 4-/5    1/23/17 mmt  Ankle DF L 4+/5  Ankle PF L (mmt not WB testing)4+/5  Ankle IN L 4+/5  Ankle EV L 4+/5   Special Tests:          n/a  Neurological Screen:        Sensation: llight touch intact   Functional Mobility:         Gait/Ambulation:  Immobilizer boot left ankle with increased L LE ER with stance and decreased hip extension   Balance:          SLB unable L LE with pain R good    Body Structures Involved:  1. Bones  2. Joints  3. Muscles Body Functions Affected:  1. Neuromusculoskeletal  2. Movement Related Activities and Participation Affected:  1. General Tasks and Demands  2. Mobility  3. Self Care  4. Community, Social and Civic Life   CLINICAL PRESENTATION:   CLINICAL DECISION MAKING:   Outcome Measure: Tool Used: Lower Extremity Functional Scale (LEFS)  Score:  Initial: 33/80 Most Recent: X/80 (Date: -- )   Interpretation of Score: 20 questions each scored on a 5 point scale with 0 representing \"extreme difficulty or unable to perform\" and 4 representing \"no difficulty\". The lower the score, the greater the functional disability. 80/80 represents no disability. Minimal detectable change is 9 points.   Score 80 79-63 62-48 47-32 31-16 15-1 0   Modifier CH CI CJ CK CL CM CN       · Patient is expected to demonstrate progress in strength, range of motion, balance, coordination and functional technique to increase independence with with gait. Reason for Services/Other Comments:  · Patient has been observed to have decreased strength and ROM before, during or after an intervention. TREATMENT:   (In addition to Assessment/Re-Assessment sessions the following treatments were rendered)  THERAPEUTIC EXERCISE: (see grid for minutes):  Exercises per grid below to improve mobility and strength. Required moderate visual, verbal, manual and tactile cues to promote proper body alignment, promote proper body posture and promote proper body mechanics. Progressed resistance, range and repetitions as indicated. MANUAL THERAPY: (see grid for minutes): Joint mobilization and Soft tissue mobilization was utilized and necessary because of the patient's restricted joint motion, painful spasm and loss of articular motion. MODALITIES: (see grid for minutes): *  Ultrasound was used today secondary to the patient having symptomatic soft tissue calcification. Ultrasound was used today to reduce pain, reduce spasm, reduce joint stiffness, increase muscle flexibility, increase tendon flexibility and increase ligament flexibility. *  Iontophoresis was used today secondary to the patient having thick adhesive scars and was used for the topical delivery of dexa into the patient's to reduce pain. *  Electrical Stimulation Therapy (IFC) was provided with intensity adjusted throughout treatment to patient tolerance. to reduce pain       *  Cold Pack Therapy in order to provide analgesia and reduce inflammation and edema. *  Hot Pack Therapy in order to relieve muscle spasm.      Date: 1/11/18 1/16/18  (visit 2) 1/19/18  (visit 3) 1/23/18  (visit 4) 1/26/18  visit 5) 1/29/18  (visit 6) 2/1/18  (visit 7)   Modalities: 8 min 8 mins 8 min  8 min  10 mins 10 mins   US with keto 8 min 1 mhz x 1.2 ntensity L PF surface around sesmoid 8' 1 mhx x 1.5 intensity with keto repeat repeat      Whirlpool cold then warm      5' each with ankle pumps 5' in each whirlpool to de--sensitize to temperature changes              Therapeutic Exercise: 10 mins     30 min  30 min   Towel crunches and inv/ev 1' each     TB side steps with TB on ankles 3 laps x 20 ft B RDL 4# MB taps to table 3x12   [place] board 4 dir 30x each     Slider rettro lunges 3x10 B SLB with hip ER MB to wall tap 3x10 4#   Long sit Ankle pumps with mirror 1'     Wall falls with heel down for eccentric gastroc 3x10  hold   [place]  1'     SLB with TB HABD 3x10 B UE Lunge reto with L LE forward and R LE on slider 3x10 R LE   edu on HEP  D1/D2 with TB, PROM toe fexion/extension using mirror repeat   Rockboard series 20x/30 sec balance with head turn, and SL diagnonals 20x each dir Repeat, squats 10x on rocboard each direction and large [email or web address] 15x   Small circleboard      10x3 each dir with alt foot on ground 30x each   Proprioceptive Activities:                                        Manual Therapy:  10 mis 10 min 12 mins 15 min 8 mins 15 min    Joints stabs   D1 and D2 3x10 each  repeat repeat Scar massage  repeat Resisted D1 and D2 joint stabilzation of ankle 3x10 each direction   MFR intrinsics between MT and prox first ray PF mobs    5' repeat repeat Scar tissue massage to incision plantar surface   Aquatic therapy:  45 mins 45 mins 45 mins 45 mins     Deep Well Jogging  30'' running, backward frog, side crab, scissor, jacks 30' running, backward frog, side crab, scissor, jacks, punches with belt, weights UE press downs without belt Repeat wit increased speed and using UE dumbells instead of jogging belt for support 3 laps each with 3# ankle weights and belt, 2 laps with DB weights in arms and not ankle weights     Gait 4 dir  3 laps each  3 laps each  3 laps each 3 laps each      FIn at the end of pool  5x1' 5x1' 3' Fig 4 stretch, gastroc stretch [id number] sec holds each      Step ups    Forward and lateral 15x each L LE 15x each     SLB with alternate UE ABD and SLB L LE with hip opener rotation to right (hip ER)  3x10 with L LE SLB, 30x ER with paddles resistance Repeat with paddle resistance 30x each  Repeat + SLB L LE with R LE hip circles (4#) 3x1- eaech 10x each     HEP: Provided HEP handout on 1/11/18  Treatment/Session Assessment:  She tolerated increased complexity of exercise and she is having less flare ups between session. She reports more pain around sesmoid area with toe extension motion versus the WB position. Recommend next visit adding TM heel to toe push off gait with R LE balance on side of treadmill and L LE on treadmill for partial WB with TM speed < 0.8 mph to allow her to transition to partial WB heel to toe push off position with decrease weight on foot. Con't with POC. · Pain/ Symptoms: Initial:   0/10 \"I was sore after the last session\" Post Session:  0/10 No increased ·   Compliance with Program/Exercises: Will assess as treatment progresses. · Recommendations/Intent for next treatment session: \"Next visit will focus on advancements to more challenging activities\".    Total Treatment Duration:  PT Patient Time In/Time Out  Time In: 1531  Time Out: [address]

## 2018-02-05 ENCOUNTER — HOSPITAL ENCOUNTER (OUTPATIENT)
Dept: PHYSICAL THERAPY | Age: 23
Discharge: HOME | End: 2018-02-05
Payer: SELF-PAY

## 2018-02-05 PROCEDURE — 97140 MANUAL THERAPY 1/> REGIONS: CPT | Performed by: PHYSICAL THERAPIST

## 2018-02-05 PROCEDURE — 97022 WHIRLPOOL THERAPY: CPT | Performed by: PHYSICAL THERAPIST

## 2018-02-05 PROCEDURE — 97110 THERAPEUTIC EXERCISES: CPT | Performed by: PHYSICAL THERAPIST

## 2018-02-05 NOTE — PROGRESS NOTES
REHABILITATION [facility]  : 1995 [facility] [address], [place].  [phone]   [phone]        OUTPATIENT PHYSICAL THERAPY:Daily Note 2018        ICD-10: Treatment Diagnosis: Pain in left foot (M79.672)Effusion, left foot (M25.475)  Precautions/Allergies:   Erythromycin   Fall Risk Score: 0 (? 5 = High Risk)  MD Orders: Evaluation and treatment  MEDICAL/REFERRING DIAGNOSIS:  Complex regional pain syndrome I, unspecified [G90.50]  Pain in left foot [M79.672]   DATE OF ONSET: sp sesmoidectomy 17 with 6 mos hx of on/off pain prior to surgery   REFERRING PHYSICIAN: Radha Porter MD  RETURN PHYSICIAN APPOINTMENT: TBD     INITIAL ASSESSMENT:  Ms. Reshma Whitney presents with decreased strength R LE, decreased ROM, pain, and swelling L foot. Her symptoms are consistent with right s/p sesamoidectomy and complex regional pain syndrome. She has a high level of irritability and pain. She reports frequent episodes of foot swelling, pain, trophic changes with insidious onset. She will benefit from PT serivces with focus on pain control and de-sensitization so she can return to PLOF. PROBLEM LIST (Impacting functional limitations):  1. Decreased Strength  2. Decreased ADL/Functional Activities  3. Decreased Ambulation Ability/Technique  4. Decreased Balance  5. Increased Pain  6. Decreased Flexibility/Joint Mobility INTERVENTIONS PLANNED:  1. Balance Exercise  2. Cold  3. Cryotherapy  4. Electrical Stimulation  5. Gait Training  6. Heat  7. Home Exercise Program (HEP)  8. Manual Therapy  9. Range of Motion (ROM)  10. Therapeutic Activites  11. Therapeutic Exercise/Strengthening   12. Aquatics   TREATMENT PLAN:  Effective Dates: 18 TO 4/3/18. Frequency/Duration: 2-3 times a week for 12 weeks  GOALS: (Goals have been discussed and agreed upon with patient.)  Short-Term Functional Goals: Time Frame: 2 weeks   1.  Patient will be independent with HEP without pain. 2. Patient will have improved gait pattern WNL wearing gym shoes with cushion and rigid base, allowing her to ambulate 10 minutes without pain. 3. Patient will have improved SLB to 30 seconds without pain improving intrinsic foot strength. Discharge Goals: Time Frame: 12 weeks  1. Patient will have improved LEFS to > 60/80 allowing her to return to community participation without pain. 2. Patient will have improved mmt to 5-/5 B LE allowing her to progress to ascending/descending stairs without pain. 3. 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 Rehabilitation Potential For Stated Goals: Good                HISTORY:   History of Present Injury/Illness (Reason for Referral):  22 y/o F with hx of left foot pain at the ball of her foot. She has been diagnosed with sesamoiditis and was making progress with PT services but had a sudden severe increase in pain and swelling and had a left sesamoidectomy on 11/29/17. She continues to have pain left foot 8/10 intensity daily. She has trophic changes and has been diagnosed with CRPS. She has easy flare ups without   Past Medical History/Comorbidities:   Ms. Nick Kim  has no past medical history on file. Ms. Nick Kim  has a past surgical history that includes hx orthopaedic (Left, age 17) and hx heent. Social History/Living Environment:     Independent, [place] on campus with roommates   Prior Level of Function/Work/Activity:  Independent with running and Equestrian  Previous Treatment Approaches:          Prior PT, she never got her prescribed orthotics   Current Medications:    Current Outpatient Prescriptions:     Lisdexamfetamine (VYVANSE) 50 mg cap, Take 50 mg by mouth every morning.   \"for anxiety\", Disp: , Rfl:    Date Last Reviewed:  2/5/2018   EXAMINATION:   Observation/Orthostatic Postural Assessment:          Left foot purple coloration (especially 1st digit), scar is clean and healed, toe nail dry and cracking, cracking skin lateral foot   Palpation:          Sensitive with light touch left plantar surface   ROM:     AROM/PROM  DF R 20/25 L 10/15  PF R 45/60 L 15/20   IN R 30/40 L 15/20  EV R 20/25 L 5/10   First MTP flexion R 45/50 L 10/15 deg with pain  First MTP extension R 45/60 deg L 5/10 deg with pain    1/23/18  AROM and PROM WNL L DF/[place]/IN/EV/first ray MTP ext and flexion   Strength:     mmt  Hip abduction 4/5 B  Hip extension 4/5 B  Ankle DF R 5/5 L 4-/5  Ankle PF R 5/5 L 4-/5  Ankle IN R 5/5 L 4-/5  Ankle EV R 5/5 L 4-/5    1/23/17 mmt  Ankle DF L 4+/5  Ankle PF L (mmt not WB testing)4+/5  Ankle IN L 4+/5  Ankle EV L 4+/5   Special Tests:          n/a  Neurological Screen:        Sensation: llight touch intact   Functional Mobility:         Gait/Ambulation:  Immobilizer boot left ankle with increased L LE ER with stance and decreased hip extension   Balance:          SLB unable L LE with pain R good    Body Structures Involved:  1. Bones  2. Joints  3. Muscles Body Functions Affected:  1. Neuromusculoskeletal  2. Movement Related Activities and Participation Affected:  1. General Tasks and Demands  2. Mobility  3. Self Care  4. Community, Social and Civic Life   CLINICAL PRESENTATION:   CLINICAL DECISION MAKING:   Outcome Measure: Tool Used: Lower Extremity Functional Scale (LEFS)  Score:  Initial: 33/80 Most Recent: X/80 (Date: -- )   Interpretation of Score: 20 questions each scored on a 5 point scale with 0 representing \"extreme difficulty or unable to perform\" and 4 representing \"no difficulty\". The lower the score, the greater the functional disability. 80/80 represents no disability. Minimal detectable change is 9 points.   Score 80 79-63 62-48 47-32 31-16 15-1 0   Modifier CH CI CJ CK CL CM CN       · Patient is expected to demonstrate progress in strength, range of motion, balance, coordination and functional technique to increase independence with with gait. Reason for Services/Other Comments:  · Patient has been observed to have decreased strength and ROM before, during or after an intervention. TREATMENT:   (In addition to Assessment/Re-Assessment sessions the following treatments were rendered)  THERAPEUTIC EXERCISE: (see grid for minutes):  Exercises per grid below to improve mobility and strength. Required moderate visual, verbal, manual and tactile cues to promote proper body alignment, promote proper body posture and promote proper body mechanics. Progressed resistance, range and repetitions as indicated. MANUAL THERAPY: (see grid for minutes): Joint mobilization and Soft tissue mobilization was utilized and necessary because of the patient's restricted joint motion, painful spasm and loss of articular motion. MODALITIES: (see grid for minutes): *  Ultrasound was used today secondary to the patient having symptomatic soft tissue calcification. Ultrasound was used today to reduce pain, reduce spasm, reduce joint stiffness, increase muscle flexibility, increase tendon flexibility and increase ligament flexibility. *  Iontophoresis was used today secondary to the patient having thick adhesive scars and was used for the topical delivery of dexa into the patient's to reduce pain. *  Electrical Stimulation Therapy (IFC) was provided with intensity adjusted throughout treatment to patient tolerance. to reduce pain       *  Cold Pack Therapy in order to provide analgesia and reduce inflammation and edema. *  Hot Pack Therapy in order to relieve muscle spasm.      Date: 1/11/18 1/16/18  (visit 2) 1/19/18  (visit 3) 1/23/18  (visit 4) 1/26/18  visit 5) 1/29/18  (visit 6) 2/1/18  (visit 7) 2/5/18  (visit 8)   Modalities: 8 min 8 mins 8 min  8 min  10 mins 10 mins 10 minutes   US with keto 8 min 1 mhz x 1.2 ntensity L PF surface around sesmoid 8' 1 mhx x 1.5 intensity with keto repeat repeat       Whirlpool cold then warm      5' each with ankle pumps 5' in each whirlpool to de--sensitize to temperature changes  5' each              Therapeutic Exercise: 10 mins     30 min  30 min 35 minutes   Towel crunches and inv/ev 1' each     TB side steps with TB on ankles 3 laps x 20 ft B RDL 4# MB taps to table 3x12    RDL 8# MB taps to table        2x15   [place] board 4 dir 30x each     Slider rettro lunges 3x10 B SLB with hip ER MB to wall tap 3x10 4# SLB with MB wall taps 8# 2x10   Long sit Ankle pumps with mirror 1'     Wall falls with heel down for eccentric gastroc 3x10  hold    [place]  1'     SLB with TB HABD 3x10 B UE Lunge reto with L LE forward and R LE on slider 3x10 R LE Lunge retro LLE forward and RLE on slider 2x15;  Single squat on L with RLE on slider  2x15   edu on HEP  D1/D2 with TB, PROM toe fexion/extension using mirror repeat   Rockboard series 20x/30 sec balance with head turn, and SL diagnonals 20x each dir Repeat, squats 10x on rocboard each direction and large wobblebard 15x Squats on rockerboard each direction  x15 each; wobbleboard  x15   treadmill        With RLE off treadmill and LLE on treadmill  4 minutes at .8 mph     Small circleboard      10x3 each dir with alt foot on ground 30x each x30 each seated    Proprioceptive Activities:                                            Manual Therapy:  10 mis 10 min 12 mins 15 min 8 mins 15 min  15 minutes   Joints stabs   D1 and D2 3x10 each  repeat repeat Scar massage  repeat Resisted D1 and D2 joint stabilzation of ankle 3x10 each direction    MFR intrinsics between MT and prox first ray PF mobs    5' repeat repeat Scar tissue massage to incision plantar surface repeat   Aquatic therapy:  45 mins 45 mins 45 mins 45 mins      Deep Well Jogging  30'' running, backward frog, side crab, scissor, jacks 30' running, backward frog, side crab, scissor, jacks, punches with belt, weights UE press downs without belt Repeat wit increased speed and using UE dumbells instead of jogging belt for support 3 laps each with 3# ankle weights and belt, 2 laps with DB weights in arms and not ankle weights      Gait 4 dir  3 laps each  3 laps each  3 laps each 3 laps each       FIn at the end of pool  5x1' 5x1' 3' Fig 4 stretch, gastroc stretch [id number] sec holds each       Step ups    Forward and lateral 15x each L LE 15x each      SLB with alternate UE ABD and SLB L LE with hip opener rotation to right (hip ER)  3x10 with L LE SLB, 30x ER with paddles resistance Repeat with paddle resistance 30x each  Repeat + SLB L LE with R LE hip circles (4#) 3x1- eaech 10x each      HEP: Provided HEP handout on 1/11/18  Treatment/Session Assessment:  Began working on heel to toe gait on treadmill today as well as focus of strengthening to control knee valgus. Pt has significant tendency for knee valgus, but pt is learning to self-correct during exercises. Pt instructed not to increase her time out of the boot yet, but perform the same amount of time out of the boot tomorrow to see how she tolerates being out the boot again. · Pain/ Symptoms: Initial:   1/10 \"I have had the normal burning but it's better. I was out of my boot this weekend for a few hours shoe shopping because Vervince Nichols told me I could come out and the pain was not worse. \" Post Session:  0/10 \"I felt a little soreness doing the exercises, but now it feels about like it normally does. \" ·   Compliance with Program/Exercises: compliant  · Recommendations/Intent for next treatment session: \"Next visit will focus on advancements to more challenging activities\".    Total Treatment Duration:  PT Patient Time In/Time Out  Time In: 1400  Time Out: 1500     Monika Ferrara, PT

## 2018-02-06 NOTE — PROGRESS NOTES
REHABILITATION [facility]  : 1995 [facility] [address], [address] [place] [address]  [phone]   [phone]        OUTPATIENT PHYSICAL THERAPY:Daily Note 2018        ICD-10: Treatment Diagnosis: Pain in left foot (M79.672)Effusion, left foot (M25.475)  Precautions/Allergies:   Erythromycin   Fall Risk Score: 0 (? 5 = High Risk)  MD Orders: Evaluation and treatment  MEDICAL/REFERRING DIAGNOSIS:  Complex regional pain syndrome I, unspecified [G90.50]  Pain in left foot [M79.672]   DATE OF ONSET: sp sesmoidectomy 17 with 6 mos hx of on/off pain prior to surgery   REFERRING PHYSICIAN: Jenna Burt MD  RETURN PHYSICIAN APPOINTMENT: TBD     INITIAL ASSESSMENT:  Ms. Margarita Beavers presents with decreased strength R LE, decreased ROM, pain, and swelling L foot. Her symptoms are consistent with right s/p sesamoidectomy and complex regional pain syndrome. She has a high level of irritability and pain. She reports frequent episodes of foot swelling, pain, trophic changes with insidious onset. She will benefit from PT serivces with focus on pain control and de-sensitization so she can return to PLOF. PROBLEM LIST (Impacting functional limitations):  1. Decreased Strength  2. Decreased ADL/Functional Activities  3. Decreased Ambulation Ability/Technique  4. Decreased Balance  5. Increased Pain  6. Decreased Flexibility/Joint Mobility INTERVENTIONS PLANNED:  1. Balance Exercise  2. Cold  3. Cryotherapy  4. Electrical Stimulation  5. Gait Training  6. Heat  7. Home Exercise Program (HEP)  8. Manual Therapy  9. Range of Motion (ROM)  10. Therapeutic Activites  11. Therapeutic Exercise/Strengthening   12. Aquatics   TREATMENT PLAN:  Effective Dates: 18 TO 4/3/18. Frequency/Duration: 2-3 times a week for 12 weeks  GOALS: (Goals have been discussed and agreed upon with patient.)  Short-Term Functional Goals: Time Frame: 2 weeks   1.  Patient will be independent with HEP without pain. 2. Patient will have improved gait pattern WNL wearing gym shoes with cushion and rigid base, allowing her to ambulate 10 minutes without pain. 3. Patient will have improved SLB to 30 seconds without pain improving intrinsic foot strength. Discharge Goals: Time Frame: 12 weeks  1. Patient will have improved LEFS to > 60/80 allowing her to return to community participation without pain. 2. Patient will have improved mmt to 5-/5 B LE allowing her to progress to ascending/descending stairs without pain. 3. 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 Rehabilitation Potential For Stated Goals: Good                HISTORY:   History of Present Injury/Illness (Reason for Referral):  24 y/o F with hx of left foot pain at the ball of her foot. She has been diagnosed with sesamoiditis and was making progress with PT services but had a sudden severe increase in pain and swelling and had a left sesamoidectomy on 11/29/17. She continues to have pain left foot 8/10 intensity daily. She has trophic changes and has been diagnosed with CRPS. She has easy flare ups without   Past Medical History/Comorbidities:   Ms. Nick Kim  has no past medical history on file. Ms. Nick Kim  has a past surgical history that includes hx orthopaedic (Left, age 17) and hx heent. Social History/Living Environment:     Independent, [place] campus with roommates   Prior Level of Function/Work/Activity:  Independent with running and Equestrian  Previous Treatment Approaches:          Prior PT, she never got her prescribed orthotics   Current Medications:    Current Outpatient Prescriptions:     Lisdexamfetamine (VYVANSE) 50 mg cap, Take 50 mg by mouth every morning.   \"for anxiety\", Disp: , Rfl:    Date Last Reviewed:  2/7/2018   EXAMINATION:   Observation/Orthostatic Postural Assessment:          Left foot purple coloration (especially 1st digit), scar is clean and healed, toe nail dry and cracking, cracking skin lateral foot   Palpation:          Sensitive with light touch left plantar surface   ROM:     AROM/PROM  DF R 20/25 L 10/15  PF R 45/60 L 15/20   IN R 30/40 L 15/20  EV R 20/25 L 5/10   First MTP flexion R 45/50 L 10/15 deg with pain  First MTP extension R 45/60 deg L 5/10 deg with pain    1/23/18  AROM and PROM WNL L DF/[place]/IN/EV/first ray MTP ext and flexion   Strength:     mmt  Hip abduction 4/5 B  Hip extension 4/5 B  Ankle DF R 5/5 L 4-/5  Ankle PF R 5/5 L 4-/5  Ankle IN R 5/5 L 4-/5  Ankle EV R 5/5 L 4-/5    1/23/17 mmt  Ankle DF L 4+/5  Ankle PF L (mmt not WB testing)4+/5  Ankle IN L 4+/5  Ankle EV L 4+/5   Special Tests:          n/a  Neurological Screen:        Sensation: llight touch intact   Functional Mobility:         Gait/Ambulation:  Immobilizer boot left ankle with increased L LE ER with stance and decreased hip extension   Balance:          SLB unable L LE with pain R good    Body Structures Involved:  1. Bones  2. Joints  3. Muscles Body Functions Affected:  1. Neuromusculoskeletal  2. Movement Related Activities and Participation Affected:  1. General Tasks and Demands  2. Mobility  3. Self Care  4. Community, Social and Civic Life   CLINICAL PRESENTATION:   CLINICAL DECISION MAKING:   Outcome Measure: Tool Used: Lower Extremity Functional Scale (LEFS)  Score:  Initial: 33/80 Most Recent: X/80 (Date: -- )   Interpretation of Score: 20 questions each scored on a 5 point scale with 0 representing \"extreme difficulty or unable to perform\" and 4 representing \"no difficulty\". The lower the score, the greater the functional disability. 80/80 represents no disability. Minimal detectable change is 9 points.   Score 80 79-63 62-48 47-32 31-16 15-1 0   Modifier CH CI CJ CK CL CM CN       · Patient is expected to demonstrate progress in strength, range of motion, balance, coordination and functional technique to increase independence with with gait. Reason for Services/Other Comments:  · Patient has been observed to have decreased strength and ROM before, during or after an intervention. TREATMENT:   (In addition to Assessment/Re-Assessment sessions the following treatments were rendered)  THERAPEUTIC EXERCISE: (see grid for minutes):  Exercises per grid below to improve mobility and strength. Required moderate visual, verbal, manual and tactile cues to promote proper body alignment, promote proper body posture and promote proper body mechanics. Progressed resistance, range and repetitions as indicated. MANUAL THERAPY: (see grid for minutes): Joint mobilization and Soft tissue mobilization was utilized and necessary because of the patient's restricted joint motion, painful spasm and loss of articular motion. MODALITIES: (see grid for minutes): *  Ultrasound was used today secondary to the patient having symptomatic soft tissue calcification. Ultrasound was used today to reduce pain, reduce spasm, reduce joint stiffness, increase muscle flexibility, increase tendon flexibility and increase ligament flexibility. *  Iontophoresis was used today secondary to the patient having thick adhesive scars and was used for the topical delivery of dexa into the patient's to reduce pain. *  Electrical Stimulation Therapy (IFC) was provided with intensity adjusted throughout treatment to patient tolerance. to reduce pain       *  Cold Pack Therapy in order to provide analgesia and reduce inflammation and edema. *  Hot Pack Therapy in order to relieve muscle spasm.      Date: 1/11/18 1/16/18  (visit 2) 1/19/18  (visit 3) 1/23/18  (visit 4) 1/26/18  visit 5) 1/29/18  (visit 6) 2/1/18  (visit 7) 2/5/18  (visit 8) 2/7/18  (visit 9)   Modalities: 8 min 8 mins 8 min  8 min  10 mins 10 mins 10 minutes    US with keto 8 min 1 mhz x 1.2 ntensity L PF surface around sesmoid 8' 1 mhx x 1.5 intensity with keto repeat repeat Whirlpool cold then warm      5' each with ankle pumps 5' in each whirlpool to de--sensitize to temperature changes  5' each 5 min each               Therapeutic Exercise: 10 mins     30 min  30 min 35 minutes 35 min   Towel crunches and inv/ev 1' each     TB side steps with TB on ankles 3 laps x 20 ft B RDL 4# MB taps to table 3x12     RDL 8# MB taps to table        2x15 SL RDL and UE row 15# cable machine 3x10 B   [place] board 4 dir 30x each     Slider rettro lunges 3x10 B SLB with hip ER MB to wall tap 3x10 4# SLB with MB wall taps 8# 2x10 TB badn above knees with SLB hip ER 2x10 B   Long sit Ankle pumps with mirror 1'     Wall falls with heel down for eccentric gastroc 3x10  hold     [place]  1'     SLB with TB HABD 3x10 B UE Lunge reto with L LE forward and R LE on slider 3x10 R LE Lunge retro LLE forward and RLE on slider 2x15;  Single squat on L with RLE on slider  2x15 Lunge with back leg on SB 3x10 to avoid 1st ray extension    edu on HEP  D1/D2 with TB, PROM toe fexion/extension using mirror repeat   Rockboard series 20x/30 sec balance with head turn, and SL diagnonals 20x each dir Repeat, squats 10x on rocboard each direction and large wobblebard 15x Squats on rockerboard each direction  x15 each; wobbleboard  x15 BOSU squats 3x10 with weights shifts at end 10\" lateral and forward   treadmill        With RLE off treadmill and LLE on treadmill  4 minutes at .8 mph   x4'   Small circleboard      10x3 each dir with alt foot on ground 30x each x30 each seated  30 x SLB   Proprioceptive Activities:                                                Manual Therapy:  10 mis 10 min 12 mins 15 min 8 mins 15 min  15 minutes 10 mins   Joints stabs   D1 and D2 3x10 each  repeat repeat Scar massage  repeat Resisted D1 and D2 joint stabilzation of ankle 3x10 each direction     MFR intrinsics between MT and prox first ray PF mobs    5' repeat repeat Scar tissue massage to incision plantar surface repeat repeat Aquatic therapy:  45 mins 45 mins 45 mins 45 mins       Deep Well Jogging  30'' running, backward frog, side crab, scissor, jacks 30' running, backward frog, side crab, scissor, jacks, punches with belt, weights UE press downs without belt Repeat wit increased speed and using UE dumbells instead of jogging belt for support 3 laps each with 3# ankle weights and belt, 2 laps with DB weights in arms and not ankle weights       Gait 4 dir  3 laps each  3 laps each  3 laps each 3 laps each        FIn at the end of pool  5x1' 5x1' 3' Fig 4 stretch, gastroc stretch [id number] sec holds each        Step ups    Forward and lateral 15x each L LE 15x each       SLB with alternate UE ABD and SLB L LE with hip opener rotation to right (hip ER)  3x10 with L LE SLB, 30x ER with paddles resistance Repeat with paddle resistance 30x each  Repeat + SLB L LE with R LE hip circles (4#) 3x1- eaech 10x each       HEP: Provided HEP handout on 1/11/18  Treatment/Session Assessment:  She has improved body mechanics with therapy. She was educated to avoid walking > 100 ft without boot on. Con't with POC. · Pain/ Symptoms: Initial:   1/10 \"I am weaning out of my boot and my pain is not any worse\" Post Session:  4/10 The ball of her foot flared up to 4/10 with some WB exercises but improved after session ·   Compliance with Program/Exercises: compliant  · Recommendations/Intent for next treatment session: \"Next visit will focus on advancements to more challenging activities\".    Total Treatment Duration:  PT Patient Time In/Time Out  Time In: 0800  Time Out: [id number] [facility] [place], PT

## 2018-02-07 ENCOUNTER — HOSPITAL ENCOUNTER (OUTPATIENT)
Dept: PHYSICAL THERAPY | Age: 23
Discharge: HOME | End: 2018-02-07
Payer: SELF-PAY

## 2018-02-07 PROCEDURE — 97110 THERAPEUTIC EXERCISES: CPT

## 2018-02-07 PROCEDURE — 97140 MANUAL THERAPY 1/> REGIONS: CPT

## 2018-02-13 ENCOUNTER — APPOINTMENT (OUTPATIENT)
Dept: PHYSICAL THERAPY | Age: 23
End: 2018-02-13
Payer: SELF-PAY

## 2018-02-15 ENCOUNTER — HOSPITAL ENCOUNTER (OUTPATIENT)
Dept: PHYSICAL THERAPY | Age: 23
Discharge: HOME | End: 2018-02-15
Payer: SELF-PAY

## 2018-02-15 PROCEDURE — 97110 THERAPEUTIC EXERCISES: CPT

## 2018-02-15 PROCEDURE — 97140 MANUAL THERAPY 1/> REGIONS: CPT

## 2018-02-15 NOTE — PROGRESS NOTES
REHABILITATION [facility]  : [address] [facility] [address]  [facility] [facility], [address] [place] [address]  [phone]   [phone]        OUTPATIENT PHYSICAL THERAPY:Daily Note and Progress Report 2/15/2018        ICD-10: Treatment Diagnosis: Pain in left foot (M79.672)Effusion, left foot (M25.475)  Precautions/Allergies:   Erythromycin   Fall Risk Score: 0 (? 5 = High Risk)  MD Orders: Evaluation and treatment  MEDICAL/REFERRING DIAGNOSIS:  Complex regional pain syndrome I, unspecified [G90.50]  Pain in left foot [M79.672]   DATE OF ONSET: sp sesmoidectomy 17 with 6 mos hx of on/off pain prior to surgery   REFERRING PHYSICIAN: Amy Royal MD  RETURN PHYSICIAN APPOINTMENT: TBD     INITIAL ASSESSMENT:  Ms. Ander Gonzáles presents with decreased strength R LE, decreased ROM, pain, and swelling L foot. Her symptoms are consistent with right s/p sesamoidectomy and complex regional pain syndrome. She has a high level of irritability and pain. She reports frequent episodes of foot swelling, pain, trophic changes with insidious onset. She will benefit from PT serivces with focus on pain control and de-sensitization so she can return to OF. PROBLEM LIST (Impacting functional limitations):  1. Decreased Strength  2. Decreased ADL/Functional Activities  3. Decreased Ambulation Ability/Technique  4. Decreased Balance  5. Increased Pain  6. Decreased Flexibility/Joint Mobility INTERVENTIONS PLANNED:  1. Balance Exercise  2. Cold  3. Cryotherapy  4. Electrical Stimulation  5. Gait Training  6. Heat  7. Home Exercise Program (HEP)  8. Manual Therapy  9. Range of Motion (ROM)  10. Therapeutic Activites  11. Therapeutic Exercise/Strengthening   12. Aquatics   TREATMENT PLAN:  Effective Dates: 18 TO 4/3/18.   Frequency/Duration: 2-3 times a week for 12 weeks  GOALS: (Goals have been discussed and agreed upon with patient.)  Short-Term Functional Goals: Time Frame: 2 weeks 1. Patient will be independent with HEP without pain.(MET)  2. Patient will have improved gait pattern WNL wearing gym shoes with cushion and rigid base, allowing her to ambulate 10 minutes without pain.(MET)   3. Patient will have improved SLB to 30 seconds without pain improving intrinsic foot strength. (MET)  Discharge Goals: Time Frame: 12 weeks  1. Patient will have improved LEFS to > 60/80 allowing her to return to community participation without pain. 2. Patient will have improved mmt to 5-/5 B LE allowing her to progress to ascending/descending stairs without pain. 3. 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 Rehabilitation Potential For Stated Goals: Good                HISTORY:   History of Present Injury/Illness (Reason for Referral):  22 y/o F with hx of left foot pain at the ball of her foot. She has been diagnosed with sesamoiditis and was making progress with PT services but had a sudden severe increase in pain and swelling and had a left sesamoidectomy on 11/29/17. She continues to have pain left foot 8/10 intensity daily. She has trophic changes and has been diagnosed with CRPS. She has easy flare ups without   Past Medical History/Comorbidities:   Ms. Fe Eugene  has no past medical history on file. Ms. Fe Eugene  has a past surgical history that includes hx orthopaedic (Left, age 17) and hx heent. Social History/Living Environment:     Independent, lives on campus with roommates   Prior Level of Function/Work/Activity:  Independent with running and Equestrian  Previous Treatment Approaches:          Prior PT, she never got her prescribed orthotics   Current Medications:    Current Outpatient Prescriptions:     Lisdexamfetamine (VYVANSE) 50 mg cap, Take 50 mg by mouth every morning.   \"for anxiety\", Disp: , Rfl:    Date Last Reviewed:  2/15/2018   EXAMINATION:   Observation/Orthostatic Postural Assessment:          Left foot purple coloration (especially 1st digit), scar is clean and healed, toe nail dry and cracking, cracking skin lateral foot   Palpation:          Sensitive with light touch left plantar surface   ROM:     AROM/PROM  DF R 20/25 L 10/15  PF R 45/60 L 15/20   IN R 30/40 L 15/20  EV R 20/25 L 5/10   First MTP flexion R 45/50 L 10/15 deg with pain  First MTP extension R 45/60 deg L 5/10 deg with pain    1/23/18  AROM and PROM WNL L DF/MT/IN/EV/first ray MTP ext and flexion   Strength:     mmt  Hip abduction 4/5 B  Hip extension 4/5 B  Ankle DF R 5/5 L 4-/5  Ankle PF R 5/5 L 4-/5  Ankle IN R 5/5 L 4-/5  Ankle EV R 5/5 L 4-/5    1/23/17 mmt  Ankle DF L 4+/5  Ankle PF L (mmt not WB testing)4+/5  Ankle IN L 4+/5  Ankle EV L 4+/5   Special Tests:          n/a  Neurological Screen:        Sensation: llight touch intact   Functional Mobility:         Gait/Ambulation:  Immobilizer boot left ankle with increased L LE ER with stance and decreased hip extension   Balance:          SLB unable L LE with pain R good    Body Structures Involved:  1. Bones  2. Joints  3. Muscles Body Functions Affected:  1. Neuromusculoskeletal  2. Movement Related Activities and Participation Affected:  1. General Tasks and Demands  2. Mobility  3. Self Care  4. Community, Social and Civic Life   CLINICAL PRESENTATION:   CLINICAL DECISION MAKING:   Outcome Measure: Tool Used: Lower Extremity Functional Scale (LEFS)  Score:  Initial: 33/80 Most Recent: X/80 (Date: -- )   Interpretation of Score: 20 questions each scored on a 5 point scale with 0 representing \"extreme difficulty or unable to perform\" and 4 representing \"no difficulty\". The lower the score, the greater the functional disability. 80/80 represents no disability. Minimal detectable change is 9 points.   Score 80 79-63 62-48 47-32 31-16 15-1 0   Modifier CH CI CJ CK CL CM CN       · Patient is expected to demonstrate progress in strength, range of motion, balance, coordination and functional technique to increase independence with with gait. Reason for Services/Other Comments:  · Patient has been observed to have decreased strength and ROM before, during or after an intervention. TREATMENT:   (In addition to Assessment/Re-Assessment sessions the following treatments were rendered)  THERAPEUTIC EXERCISE: (see grid for minutes):  Exercises per grid below to improve mobility and strength. Required moderate visual, verbal, manual and tactile cues to promote proper body alignment, promote proper body posture and promote proper body mechanics. Progressed resistance, range and repetitions as indicated. MANUAL THERAPY: (see grid for minutes): Joint mobilization and Soft tissue mobilization was utilized and necessary because of the patient's restricted joint motion, painful spasm and loss of articular motion. MODALITIES: (see grid for minutes): *  Ultrasound was used today secondary to the patient having symptomatic soft tissue calcification. Ultrasound was used today to reduce pain, reduce spasm, reduce joint stiffness, increase muscle flexibility, increase tendon flexibility and increase ligament flexibility. *  Iontophoresis was used today secondary to the patient having thick adhesive scars and was used for the topical delivery of dexa into the patient's to reduce pain. *  Electrical Stimulation Therapy (IFC) was provided with intensity adjusted throughout treatment to patient tolerance. to reduce pain       *  Cold Pack Therapy in order to provide analgesia and reduce inflammation and edema. *  Hot Pack Therapy in order to relieve muscle spasm.      Date: 1/11/18 1/16/18  (visit 2) 1/19/18  (visit 3) 1/23/18  (visit 4) 1/26/18  visit 5) 1/29/18  (visit 6) 2/1/18  (visit 7) 2/5/18  (visit 8) 2/7/18  (visit 9) 2/15/18  (visit 10)   Modalities: 8 min 8 mins 8 min  8 min  10 mins 10 mins 10 minutes     US with keto 8 min 1 mhz x 1.2 ntensity L PF surface around sesmoid 8' 1 mhx x 1.5 intensity with keto repeat repeat         Whirlpool cold then warm      5' each with ankle pumps 5' in each whirlpool to de--sensitize to temperature changes  5' each 5 min each                 Therapeutic Exercise: 10 mins     30 min  30 min 35 minutes 35 min 35 min   Towel crunches and inv/ev 1' each     TB side steps with TB on ankles 3 laps x 20 ft B RDL 4# MB taps to table 3x12   Lateral step ups 2x10 each with hip resistance 15#    RDL 8# MB taps to table        2x15 SL RDL and UE row 15# cable machine 3x10 B    [place] board 4 dir 30x each     Slider rettro lunges 3x10 B SLB with hip ER MB to wall tap 3x10 4# SLB with MB wall taps 8# 2x10 TB badn above knees with SLB hip ER 2x10 B 3x10 B   Long sit Ankle pumps with mirror 1'     Wall falls with heel down for eccentric gastroc 3x10  hold   TB side steps and monster walks 3 laps each forward, backward, and lateral    [place]  1'     SLB with TB HABD 3x10 B UE Lunge reto with L LE forward and R LE on slider 3x10 R LE Lunge retro LLE forward and RLE on slider 2x15;  Single squat on L with RLE on slider  2x15 Lunge with back leg on SB 3x10 to avoid 1st ray extension     edu on HEP  D1/D2 with TB, PROM toe fexion/extension using mirror repeat   Rockboard series 20x/30 sec balance with head turn, and SL diagnonals 20x each dir Repeat, squats 10x on rocboard each direction and large wobblebard 15x Squats on rockerboard each direction  x15 each; wobbleboard  x15 BOSU squats 3x10 with weights shifts at end 10\" lateral and forward repeat   treadmill        With RLE off treadmill and LLE on treadmill  4 minutes at .8 mph   x4' x3'   Small circleboard      10x3 each dir with alt foot on ground 30x each x30 each seated  30 x SLB    Proprioceptive Activities:                                                    Manual Therapy:  10 mis 10 min 12 mins 15 min 8 mins 15 min  15 minutes 10 mins 10 min   Joints stabs   D1 and D2 3x10 each  repeat repeat Scar massage repeat Resisted D1 and D2 joint stabilzation of ankle 3x10 each direction   repeat   MFR intrinsics between MT and prox first ray PF mobs    5' repeat repeat Scar tissue massage to incision plantar surface repeat repeat    Aquatic therapy:  45 mins 45 mins 45 mins 45 mins        Deep Well Jogging  30'' running, backward frog, side crab, scissor, jacks 30' running, backward frog, side crab, scissor, jacks, punches with belt, weights UE press downs without belt Repeat wit increased speed and using UE dumbells instead of jogging belt for support 3 laps each with 3# ankle weights and belt, 2 laps with DB weights in arms and not ankle weights        Gait 4 dir  3 laps each  3 laps each  3 laps each 3 laps each         FIn at the end of pool  5x1' 5x1' 3' Fig 4 stretch, gastroc stretch [id number] sec holds each         Step ups    Forward and lateral 15x each L LE 15x each        SLB with alternate UE ABD and SLB L LE with hip opener rotation to right (hip ER)  3x10 with L LE SLB, 30x ER with paddles resistance Repeat with paddle resistance 30x each  Repeat + SLB L LE with R LE hip circles (4#) 3x1- eaech 10x each        HEP: Provided HEP handout on 1/11/18  Treatment/Session Assessment:  She tolerated WB exercise with some pain flare ups during but not after. She has great independence with mirror therapy, biofeedback, de-sensitization, and she has improved body mechanics. She may benefit form reduced frequency from PT if her symptoms con't to be at a plateau. Con't with POC. · Pain/ Symptoms: Initial:   1/10 \"My foot is still having the color changes and flare ups but I am tolerating it okay. I will be getting my orthotics today\" Post Session:  3/10 The ball of her foot flared up to 3/10 with some WB exercises but improved after session ·   Compliance with Program/Exercises: compliant  · Recommendations/Intent for next treatment session: \"Next visit will focus on advancements to more challenging activities\". Total Treatment Duration:  PT Patient Time In/Time Out  Time In: 0852  Time Out: Florentin Law

## 2018-02-19 NOTE — PROGRESS NOTES
She cancelled her appointment per PT request.  She was educated if she has questions or is not making progress to make a f/u PT appointment.

## 2018-02-20 ENCOUNTER — HOSPITAL ENCOUNTER (OUTPATIENT)
Dept: PHYSICAL THERAPY | Age: 23
Discharge: HOME | End: 2018-02-20
Payer: SELF-PAY

## 2018-02-22 ENCOUNTER — HOSPITAL ENCOUNTER (OUTPATIENT)
Dept: PHYSICAL THERAPY | Age: 23
Discharge: HOME | End: 2018-02-22
Payer: SELF-PAY

## 2018-02-22 PROCEDURE — 97140 MANUAL THERAPY 1/> REGIONS: CPT

## 2018-02-22 PROCEDURE — 97110 THERAPEUTIC EXERCISES: CPT

## 2018-02-22 NOTE — PROGRESS NOTES
REHABILITATION [facility]  : 1995 [facility] [address]  [address] [place] [address].  [phone]   [phone]        OUTPATIENT PHYSICAL THERAPY:Daily Note 2018        ICD-10: Treatment Diagnosis: Pain in left foot (M79.672)Effusion, left foot (M25.475)  Precautions/Allergies:   Erythromycin   Fall Risk Score: 0 (? 5 = High Risk)  MD Orders: Evaluation and treatment  MEDICAL/REFERRING DIAGNOSIS:  Complex regional pain syndrome I, unspecified [G90.50]  Pain in left foot [M79.672]   DATE OF ONSET: sp sesmoidectomy 17 with 6 mos hx of on/off pain prior to surgery   REFERRING PHYSICIAN: Nina Sexton MD  RETURN PHYSICIAN APPOINTMENT: TBD     INITIAL ASSESSMENT:  Ms. Sherine Mata presents with decreased strength R LE, decreased ROM, pain, and swelling L foot. Her symptoms are consistent with right s/p sesamoidectomy and complex regional pain syndrome. She has a high level of irritability and pain. She reports frequent episodes of foot swelling, pain, trophic changes with insidious onset. She will benefit from PT serivces with focus on pain control and de-sensitization so she can return to PLOF. PROBLEM LIST (Impacting functional limitations):  1. Decreased Strength  2. Decreased ADL/Functional Activities  3. Decreased Ambulation Ability/Technique  4. Decreased Balance  5. Increased Pain  6. Decreased Flexibility/Joint Mobility INTERVENTIONS PLANNED:  1. Balance Exercise  2. Cold  3. Cryotherapy  4. Electrical Stimulation  5. Gait Training  6. Heat  7. Home Exercise Program (HEP)  8. Manual Therapy  9. Range of Motion (ROM)  10. Therapeutic Activites  11. Therapeutic Exercise/Strengthening   12. Aquatics   TREATMENT PLAN:  Effective Dates: 18 TO 4/3/18. Frequency/Duration: 2-3 times a week for 12 weeks  GOALS: (Goals have been discussed and agreed upon with patient.)  Short-Term Functional Goals: Time Frame: 2 weeks   1.  Patient will be independent with HEP without pain.(MET)  2. Patient will have improved gait pattern WNL wearing gym shoes with cushion and rigid base, allowing her to ambulate 10 minutes without pain.(MET)   3. Patient will have improved SLB to 30 seconds without pain improving intrinsic foot strength. (MET)  Discharge Goals: Time Frame: 12 weeks  1. Patient will have improved LEFS to > 60/80 allowing her to return to community participation without pain. 2. Patient will have improved mmt to 5-/5 B LE allowing her to progress to ascending/descending stairs without pain. 3. 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 Rehabilitation Potential For Stated Goals: Good                HISTORY:   History of Present Injury/Illness (Reason for Referral):  24 y/o F with hx of left foot pain at the ball of her foot. She has been diagnosed with sesamoiditis and was making progress with PT services but had a sudden severe increase in pain and swelling and had a left sesamoidectomy on 11/29/17. She continues to have pain left foot 8/10 intensity daily. She has trophic changes and has been diagnosed with CRPS. She has easy flare ups without   Past Medical History/Comorbidities:   Ms. Latanya Mcintosh  has no past medical history on file. Ms. Latanya Mcintosh  has a past surgical history that includes hx orthopaedic (Left, age 17) and hx heent. Social History/Living Environment:     Independent, lives on campus with roommates   Prior Level of Function/Work/Activity:  Independent with running and Equestrian  Previous Treatment Approaches:          Prior PT, she never got her prescribed orthotics   Current Medications:    Current Outpatient Prescriptions:     Lisdexamfetamine (VYVANSE) 50 mg cap, Take 50 mg by mouth every morning.   \"for anxiety\", Disp: , Rfl:    Date Last Reviewed:  2/22/2018   EXAMINATION:   Observation/Orthostatic Postural Assessment:          Left foot purple coloration (especially 1st digit), scar is clean and healed, toe nail dry and cracking, cracking skin lateral foot   Palpation:          Sensitive with light touch left plantar surface   ROM:     AROM/PROM  DF R 20/25 L 10/15  PF R 45/60 L 15/20   IN R 30/40 L 15/20  EV R 20/25 L 5/10   First MTP flexion R 45/50 L 10/15 deg with pain  First MTP extension R 45/60 deg L 5/10 deg with pain    1/23/18  AROM and PROM WNL L DF/OK/IN/EV/first ray MTP ext and flexion   Strength:     mmt  Hip abduction 4/5 B  Hip extension 4/5 B  Ankle DF R 5/5 L 4-/5  Ankle PF R 5/5 L 4-/5  Ankle IN R 5/5 L 4-/5  Ankle EV R 5/5 L 4-/5    1/23/17 mmt  Ankle DF L 4+/5  Ankle PF L (mmt not WB testing)4+/5  Ankle IN L 4+/5  Ankle EV L 4+/5   Special Tests:          n/a  Neurological Screen:        Sensation: llight touch intact   Functional Mobility:         Gait/Ambulation:  Immobilizer boot left ankle with increased L LE ER with stance and decreased hip extension   Balance:          SLB unable L LE with pain R good    Body Structures Involved:  1. Bones  2. Joints  3. Muscles Body Functions Affected:  1. Neuromusculoskeletal  2. Movement Related Activities and Participation Affected:  1. General Tasks and Demands  2. Mobility  3. Self Care  4. Community, Social and Civic Life   CLINICAL PRESENTATION:   CLINICAL DECISION MAKING:   Outcome Measure: Tool Used: Lower Extremity Functional Scale (LEFS)  Score:  Initial: 33/80 Most Recent: X/80 (Date: -- )   Interpretation of Score: 20 questions each scored on a 5 point scale with 0 representing \"extreme difficulty or unable to perform\" and 4 representing \"no difficulty\". The lower the score, the greater the functional disability. 80/80 represents no disability. Minimal detectable change is 9 points.   Score 80 79-63 62-48 47-32 31-16 15-1 0   Modifier CH CI CJ CK CL CM CN       · Patient is expected to demonstrate progress in strength, range of motion, balance, coordination and functional technique to increase independence with with gait. Reason for Services/Other Comments:  · Patient has been observed to have decreased strength and ROM before, during or after an intervention. TREATMENT:   (In addition to Assessment/Re-Assessment sessions the following treatments were rendered)  THERAPEUTIC EXERCISE: (see grid for minutes):  Exercises per grid below to improve mobility and strength. Required moderate visual, verbal, manual and tactile cues to promote proper body alignment, promote proper body posture and promote proper body mechanics. Progressed resistance, range and repetitions as indicated. MANUAL THERAPY: (see grid for minutes): Joint mobilization and Soft tissue mobilization was utilized and necessary because of the patient's restricted joint motion, painful spasm and loss of articular motion. MODALITIES: (see grid for minutes): *  Ultrasound was used today secondary to the patient having symptomatic soft tissue calcification. Ultrasound was used today to reduce pain, reduce spasm, reduce joint stiffness, increase muscle flexibility, increase tendon flexibility and increase ligament flexibility. *  Iontophoresis was used today secondary to the patient having thick adhesive scars and was used for the topical delivery of dexa into the patient's to reduce pain. *  Electrical Stimulation Therapy (IFC) was provided with intensity adjusted throughout treatment to patient tolerance. to reduce pain       *  Cold Pack Therapy in order to provide analgesia and reduce inflammation and edema. *  Hot Pack Therapy in order to relieve muscle spasm.      Date: 1/11/18 1/16/18  (visit 2) 1/19/18  (visit 3) 1/23/18  (visit 4) 1/26/18  visit 5) 1/29/18  (visit 6) 2/1/18  (visit 7) 2/5/18  (visit 8) 2/7/18  (visit 9) 2/15/18  (visit 10) 2/22/18  (visit 11)   Modalities: 8 min 8 mins 8 min  8 min  10 mins 10 mins 10 minutes      US with keto 8 min 1 mhz x 1.2 ntensity L PF surface around sesmoid 8' 1 mhx x 1.5 intensity with keto repeat repeat          Whirlpool cold then warm      5' each with ankle pumps 5' in each whirlpool to de--sensitize to temperature changes  5' each 5 min each  5 min each                  Therapeutic Exercise: 10 mins     30 min  30 min 35 minutes 35 min 35 min 25 min   Towel crunches and inv/ev 1' each     TB side steps with TB on ankles 3 laps x 20 ft B RDL 4# MB taps to table 3x12   Lateral step ups 2x10 each with hip resistance 15#  15# 3x10 L LE 2x10 R LE   RDL 8# MB taps to table        2x15 SL RDL and UE row 15# cable machine 3x10 B  Seated HR 50# B 3x15   [place] board 4 dir 30x each     Slider rettro lunges 3x10 B SLB with hip ER MB to wall tap 3x10 4# SLB with MB wall taps 8# 2x10 TB badn above knees with SLB hip ER 2x10 B 3x10 B    Long sit Ankle pumps with mirror 1'     Wall falls with heel down for eccentric gastroc 3x10  hold   TB side steps and monster walks 3 laps each forward, backward, and lateral  3 laps each   [place]  1'     SLB with TB HABD 3x10 B UE Lunge reto with L LE forward and R LE on slider 3x10 R LE Lunge retro LLE forward and RLE on slider 2x15;  Single squat on L with RLE on slider  2x15 Lunge with back leg on SB 3x10 to avoid 1st ray extension   LP RIP 40# x 2' and DL HR 60# 3x15    edu on HEP  D1/D2 with TB, PROM toe fexion/extension using mirror repeat   Rockboard series 20x/30 sec balance with head turn, and SL diagnonals 20x each dir Repeat, squats 10x on rocboard each direction and large wobblebard 15x Squats on rockerboard each direction  x15 each; wobbleboard  x15 BOSU squats 3x10 with weights shifts at end 10\" lateral and forward repeat    treadmill        With RLE off treadmill and LLE on treadmill  4 minutes at .8 mph   x4' x3' Next visit retro TM   Small circleboard      10x3 each dir with alt foot on ground 30x each x30 each seated  30 x SLB     Proprioceptive Activities:                                                        Manual Therapy:  10 mis 10 min 12 mins 15 min 8 mins 15 min  15 minutes 10 mins 10 min 15 min   Joints stabs   D1 and D2 3x10 each  repeat repeat Scar massage  repeat Resisted D1 and D2 joint stabilzation of ankle 3x10 each direction   repeat Scar massage plantar surface   MFR intrinsics between MT and prox first ray PF mobs    5' repeat repeat Scar tissue massage to incision plantar surface repeat repeat     Aquatic therapy:  45 mins 45 mins 45 mins 45 mins         Deep Well Jogging  30'' running, backward frog, side crab, scissor, jacks 30' running, backward frog, side crab, scissor, jacks, punches with belt, weights UE press downs without belt Repeat wit increased speed and using UE dumbells instead of jogging belt for support 3 laps each with 3# ankle weights and belt, 2 laps with DB weights in arms and not ankle weights         Gait 4 dir  3 laps each  3 laps each  3 laps each 3 laps each          FIn at the end of pool  5x1' 5x1' 3' Fig 4 stretch, gastroc stretch [id number] sec holds each          Step ups    Forward and lateral 15x each L LE 15x each         SLB with alternate UE ABD and SLB L LE with hip opener rotation to right (hip ER)  3x10 with L LE SLB, 30x ER with paddles resistance Repeat with paddle resistance 30x each  Repeat + SLB L LE with R LE hip circles (4#) 3x1- eaech 10x each         HEP: Provided HEP handout on 1/11/18  Treatment/Session Assessment:  She tolerated partial WB heel raise variations without c/o of increased pain. She has difficulty with controlled knee valgus despite awareness and good hip mobility. Con't with POC. · Pain/ Symptoms: Initial:   2/10 \"My foot is turning purple worse than ever but my pain is getting better. I have my new orthotics and shoes and I am happy I can walk without the immobilizer boot\" Post Session:  3/10 She denies increased pain but she has noticeable antalgia leaving clinic.   ·   Compliance with Program/Exercises: compliant  · Recommendations/Intent for next treatment session: \"Next visit will focus on advancements to more challenging activities\".    Total Treatment Duration:  PT Patient Time In/Time Out  Time In: 1530  Time Out: [address] Candelario Mejia

## 2018-03-01 ENCOUNTER — HOSPITAL ENCOUNTER (OUTPATIENT)
Dept: PHYSICAL THERAPY | Age: 23
Discharge: HOME | End: 2018-03-01
Payer: SELF-PAY

## 2018-03-01 PROCEDURE — 97140 MANUAL THERAPY 1/> REGIONS: CPT

## 2018-03-01 PROCEDURE — 97110 THERAPEUTIC EXERCISES: CPT

## 2018-03-01 NOTE — PROGRESS NOTES
REHABILITATION [facility]  : 1995 [facility] [address]  [address] [facility] [place] [facility] [place].  [phone]   [phone]        OUTPATIENT PHYSICAL THERAPY:Daily Note 3/1/2018        ICD-10: Treatment Diagnosis: Pain in left foot (M79.672)Effusion, left foot (M25.475)  Precautions/Allergies:   Erythromycin   Fall Risk Score: 0 (? 5 = High Risk)  MD Orders: Evaluation and treatment  MEDICAL/REFERRING DIAGNOSIS:  Complex regional pain syndrome I, unspecified [G90.50]  Pain in left foot [M79.672]   DATE OF ONSET: sp sesmoidectomy 19 with 6 mos hx of on/off pain prior to surgery   REFERRING PHYSICIAN: Anahy Beasley MD  RETURN PHYSICIAN APPOINTMENT: TBD     INITIAL ASSESSMENT:  Ms. Jackie Hernandez presents with decreased strength R LE, decreased ROM, pain, and swelling L foot. Her symptoms are consistent with right s/p sesamoidectomy and complex regional pain syndrome. She has a high level of irritability and pain. She reports frequent episodes of foot swelling, pain, trophic changes with insidious onset. She will benefit from PT serivces with focus on pain control and de-sensitization so she can return to PLOF. PROBLEM LIST (Impacting functional limitations):  1. Decreased Strength  2. Decreased ADL/Functional Activities  3. Decreased Ambulation Ability/Technique  4. Decreased Balance  5. Increased Pain  6. Decreased Flexibility/Joint Mobility INTERVENTIONS PLANNED:  1. Balance Exercise  2. Cold  3. Cryotherapy  4. Electrical Stimulation  5. Gait Training  6. Heat  7. Home Exercise Program (HEP)  8. Manual Therapy  9. Range of Motion (ROM)  10. Therapeutic Activites  11. Therapeutic Exercise/Strengthening   12. Aquatics   TREATMENT PLAN:  Effective Dates: 18 TO 4/3/18. Frequency/Duration: 2-3 times a week for 12 weeks  GOALS: (Goals have been discussed and agreed upon with patient.)  Short-Term Functional Goals: Time Frame: 2 weeks   1.  Patient will be independent with HEP without pain.(MET)  2. Patient will have improved gait pattern WNL wearing gym shoes with cushion and rigid base, allowing her to ambulate 10 minutes without pain.(MET)   3. Patient will have improved SLB to 30 seconds without pain improving intrinsic foot strength. (MET)  Discharge Goals: Time Frame: 12 weeks  1. Patient will have improved LEFS to > 60/80 allowing her to return to community participation without pain. 2. Patient will have improved mmt to 5-/5 B LE allowing her to progress to ascending/descending stairs without pain. 3. 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 Rehabilitation Potential For Stated Goals: Good                HISTORY:   History of Present Injury/Illness (Reason for Referral):  22 y/o F with hx of left foot pain at the ball of her foot. She has been diagnosed with sesamoiditis and was making progress with PT services but had a sudden severe increase in pain and swelling and had a left sesamoidectomy on 11/29/17. She continues to have pain left foot 8/10 intensity daily. She has trophic changes and has been diagnosed with CRPS. She has easy flare ups without   Past Medical History/Comorbidities:   Ms. Cyrus Scott  has no past medical history on file. Ms. Cyrus Scott  has a past surgical history that includes hx orthopaedic (Left, age 17) and hx heent. Social History/Living Environment:     Independent, [place] campus with roommates   Prior Level of Function/Work/Activity:  Independent with running and Equestrian  Previous Treatment Approaches:          Prior PT, she never got her prescribed orthotics   Current Medications:    Current Outpatient Prescriptions:     Lisdexamfetamine (VYVANSE) 50 mg cap, Take 50 mg by mouth every morning.   \"for anxiety\", Disp: , Rfl:    Date Last Reviewed:  3/1/2018   EXAMINATION:   Observation/Orthostatic Postural Assessment:          Left foot purple coloration (especially 1st digit), scar is clean and healed, toe nail dry and cracking, cracking skin lateral foot   Palpation:          Sensitive with light touch left plantar surface   ROM:     AROM/PROM  DF R 20/25 L 10/15  PF R 45/60 L 15/20   IN R 30/40 L 15/20  EV R 20/25 L 5/10   First MTP flexion R 45/50 L 10/15 deg with pain  First MTP extension R 45/60 deg L 5/10 deg with pain    1/23/18  AROM and PROM WNL L DF/NJ/IN/EV/first ray MTP ext and flexion   Strength:     mmt  Hip abduction 4/5 B  Hip extension 4/5 B  Ankle DF R 5/5 L 4-/5  Ankle PF R 5/5 L 4-/5  Ankle IN R 5/5 L 4-/5  Ankle EV R 5/5 L 4-/5    1/23/17 mmt  Ankle DF L 4+/5  Ankle PF L (mmt not WB testing)4+/5  Ankle IN L 4+/5  Ankle EV L 4+/5   Special Tests:          n/a  Neurological Screen:        Sensation: llight touch intact   Functional Mobility:         Gait/Ambulation:  Immobilizer boot left ankle with increased L LE ER with stance and decreased hip extension   Balance:          SLB unable L LE with pain R good    Body Structures Involved:  1. Bones  2. Joints  3. Muscles Body Functions Affected:  1. Neuromusculoskeletal  2. Movement Related Activities and Participation Affected:  1. General Tasks and Demands  2. Mobility  3. Self Care  4. Community, Social and Civic Life   CLINICAL PRESENTATION:   CLINICAL DECISION MAKING:   Outcome Measure: Tool Used: Lower Extremity Functional Scale (LEFS)  Score:  Initial: 33/80 Most Recent: X/80 (Date: -- )   Interpretation of Score: 20 questions each scored on a 5 point scale with 0 representing \"extreme difficulty or unable to perform\" and 4 representing \"no difficulty\". The lower the score, the greater the functional disability. 80/80 represents no disability. Minimal detectable change is 9 points.   Score 80 79-63 62-48 47-32 31-16 15-1 0   Modifier CH CI CJ CK CL CM CN       · Patient is expected to demonstrate progress in strength, range of motion, balance, coordination and functional technique to increase independence with with gait. Reason for Services/Other Comments:  · Patient has been observed to have decreased strength and ROM before, during or after an intervention. TREATMENT:   (In addition to Assessment/Re-Assessment sessions the following treatments were rendered)  THERAPEUTIC EXERCISE: (see grid for minutes):  Exercises per grid below to improve mobility and strength. Required moderate visual, verbal, manual and tactile cues to promote proper body alignment, promote proper body posture and promote proper body mechanics. Progressed resistance, range and repetitions as indicated. MANUAL THERAPY: (see grid for minutes): Joint mobilization and Soft tissue mobilization was utilized and necessary because of the patient's restricted joint motion, painful spasm and loss of articular motion. MODALITIES: (see grid for minutes): *  Ultrasound was used today secondary to the patient having symptomatic soft tissue calcification. Ultrasound was used today to reduce pain, reduce spasm, reduce joint stiffness, increase muscle flexibility, increase tendon flexibility and increase ligament flexibility. *  Iontophoresis was used today secondary to the patient having thick adhesive scars and was used for the topical delivery of dexa into the patient's to reduce pain. *  Electrical Stimulation Therapy (IFC) was provided with intensity adjusted throughout treatment to patient tolerance. to reduce pain       *  Cold Pack Therapy in order to provide analgesia and reduce inflammation and edema. *  Hot Pack Therapy in order to relieve muscle spasm.      Date: 1/11/18 1/16/18  (visit 2) 1/19/18  (visit 3) 1/23/18  (visit 4) 1/26/18  visit 5) 1/29/18  (visit 6) 2/1/18  (visit 7) 2/5/18  (visit 8) 2/7/18  (visit 9) 2/15/18  (visit 10) 2/22/18  (visit 11) 3/1/18  (visit 12)   Modalities: 8 min 8 mins 8 min  8 min  10 mins 10 mins 10 minutes       US with keto 8 min 1 mhz x 1.2 ntensity L PF surface around sesmoid 8' 1 mhx x 1.5 intensity with keto repeat repeat           Whirlpool cold then warm      5' each with ankle pumps 5' in each whirlpool to de--sensitize to temperature changes  5' each 5 min each  5 min each                    Therapeutic Exercise: 10 mins     30 min  30 min 35 minutes 35 min 35 min 25 min 30 mins   Towel crunches and inv/ev 1' each     TB side steps with TB on ankles 3 laps x 20 ft B RDL 4# MB taps to table 3x12   Lateral step ups 2x10 each with hip resistance 15#  15# 3x10 L LE 2x10 R LE Retro TM 10% inclune 1.5 mph x 5 min    RDL 8# MB taps to table        2x15 SL RDL and UE row 15# cable machine 3x10 B  Seated HR 50# B 3x15    [place] board 4 dir 30x each     Slider rettro lunges 3x10 B SLB with hip ER MB to wall tap 3x10 4# SLB with MB wall taps 8# 2x10 TB badn above knees with SLB hip ER 2x10 B 3x10 B  SL calms with green TB 30x   Long sit Ankle pumps with mirror 1'     Wall falls with heel down for eccentric gastroc 3x10  hold   TB side steps and monster walks 3 laps each forward, backward, and lateral  3 laps each    [place]  1'     SLB with TB HABD 3x10 B UE Lunge reto with L LE forward and R LE on slider 3x10 R LE Lunge retro LLE forward and RLE on slider 2x15;  Single squat on L with RLE on slider  2x15 Lunge with back leg on SB 3x10 to avoid 1st ray extension   LP RIP 40# x 2' and DL HR 60# 3x15     edu on HEP  D1/D2 with TB, PROM toe fexion/extension using mirror repeat   Rockboard series 20x/30 sec balance with head turn, and SL diagnonals 20x each dir Repeat, squats 10x on rocboard each direction and large wobblebard 15x Squats on rockerboard each direction  x15 each; wobbleboard  x15 BOSU squats 3x10 with weights shifts at end 10\" lateral and forward repeat  SLB with hip ER with black TB 3x10 B, Sl squat with SB tap to ground ro 3x10 B   treadmill        With RLE off treadmill and LLE on treadmill  4 minutes at .8 mph   x4' x3' Next visit retro TM Split lunge 3x10 L LE only    Small circleboard      10x3 each dir with alt foot on ground 30x each x30 each seated  30 x SLB      Proprioceptive Activities:                                                            Manual Therapy:  10 mis 10 min 12 mins 15 min 8 mins 15 min  15 minutes 10 mins 10 min 15 min 10 mins   Joints stabs   D1 and D2 3x10 each  repeat repeat Scar massage  repeat Resisted D1 and D2 joint stabilzation of ankle 3x10 each direction   repeat Scar massage plantar surface repeat   MFR intrinsics between MT and prox first ray PF mobs    5' repeat repeat Scar tissue massage to incision plantar surface repeat repeat      Aquatic therapy:  45 mins 45 mins 45 mins 45 mins          Deep Well Jogging  30'' running, backward frog, side crab, scissor, jacks 30' running, backward frog, side crab, scissor, jacks, punches with belt, weights UE press downs without belt Repeat wit increased speed and using UE dumbells instead of jogging belt for support 3 laps each with 3# ankle weights and belt, 2 laps with DB weights in arms and not ankle weights          Gait 4 dir  3 laps each  3 laps each  3 laps each 3 laps each           FIn at the end of pool  5x1' 5x1' 3' Fig 4 stretch, gastroc stretch [id number] sec holds each           Step ups    Forward and lateral 15x each L LE 15x each          SLB with alternate UE ABD and SLB L LE with hip opener rotation to right (hip ER)  3x10 with L LE SLB, 30x ER with paddles resistance Repeat with paddle resistance 30x each  Repeat + SLB L LE with R LE hip circles (4#) 3x1- eaech 10x each          HEP: Provided HEP handout on 1/11/18  Treatment/Session Assessment:  She has improved gait entering clinic without antalgia. She  Has overall improved knee valgus control and it improved after spiral taping from foot to gluteal area to promote hip ER. Con't with POC.      · Pain/ Symptoms: Initial:   0/10 \"I am doing okay\" Post Session:  0/10 She had improve tolerance of WB exercise without antalgia ·   Compliance with Program/Exercises: compliant  · Recommendations/Intent for next treatment session: \"Next visit will focus on advancements to more challenging activities\".    Total Treatment Duration:  PT Patient Time In/Time Out  Time In: 1110  Time Out: [place] , [place]

## 2018-03-12 NOTE — PROGRESS NOTES
REHABILITATION [facility]  : 1995 [facility] [address]  [address] [facility] [place]  [phone]   [phone]        OUTPATIENT PHYSICAL THERAPY:Daily Note and Progress Report 3/13/2018        ICD-10: Treatment Diagnosis: Pain in left foot (M79.672)Effusion, left foot (M25.475)  Precautions/Allergies:   Erythromycin   Fall Risk Score: 0 (? 5 = High Risk)  MD Orders: Evaluation and treatment  MEDICAL/REFERRING DIAGNOSIS:  Complex regional pain syndrome I, unspecified [G90.50]  Pain in left foot [M79.672]   DATE OF ONSET: sp sesmoidectomy 19 with 6 mos hx of on/off pain prior to surgery   REFERRING PHYSICIAN: Vick Miller MD  RETURN PHYSICIAN APPOINTMENT: TBD     INITIAL ASSESSMENT:  Ms. Jax Peters presents with decreased strength R LE, decreased ROM, pain, and swelling L foot. Her symptoms are consistent with right s/p sesamoidectomy and complex regional pain syndrome. She has a high level of irritability and pain. She reports frequent episodes of foot swelling, pain, trophic changes with insidious onset. She will benefit from PT serivces with focus on pain control and de-sensitization so she can return to [facility]. PROBLEM LIST (Impacting functional limitations):  1. Decreased Strength  2. Decreased ADL/Functional Activities  3. Decreased Ambulation Ability/Technique  4. Decreased Balance  5. Increased Pain  6. Decreased Flexibility/Joint Mobility INTERVENTIONS PLANNED:  1. Balance Exercise  2. Cold  3. Cryotherapy  4. Electrical Stimulation  5. Gait Training  6. Heat  7. Home Exercise Program (HEP)  8. Manual Therapy  9. Range of Motion (ROM)  10. Therapeutic Activites  11. Therapeutic Exercise/Strengthening   12. Aquatics   TREATMENT PLAN:  Effective Dates: 18 TO 4/3/18.   Frequency/Duration: 2-3 times a week for 12 weeks  GOALS: (Goals have been discussed and agreed upon with patient.)  Short-Term Functional Goals: Time Frame: 2 weeks 1. Patient will be independent with HEP without pain.(MET)  2. Patient will have improved gait pattern WNL wearing gym shoes with cushion and rigid base, allowing her to ambulate 10 minutes without pain.(MET)   3. Patient will have improved SLB to 30 seconds without pain improving intrinsic foot strength. (MET)  Discharge Goals: Time Frame: 12 weeks  1. Patient will have improved LEFS to > 60/80 allowing her to return to community participation without pain.(progressing)  2. Patient will have improved mmt to 5-/5 B LE allowing her to progress to ascending/descending stairs without pain.(progressing)  3. 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 Rehabilitation Potential For Stated Goals: Good                HISTORY:   History of Present Injury/Illness (Reason for Referral):  24 y/o F with hx of left foot pain at the ball of her foot. She has been diagnosed with sesamoiditis and was making progress with PT services but had a sudden severe increase in pain and swelling and had a left sesamoidectomy on 11/29/17. She continues to have pain left foot 8/10 intensity daily. She has trophic changes and has been diagnosed with CRPS. She has easy flare ups without   Past Medical History/Comorbidities:   Ms. Joseph Briones  has no past medical history on file. Ms. Joseph Briones  has a past surgical history that includes hx orthopaedic (Left, age 17) and hx heent. Social History/Living Environment:     Independent, [place] on campus with roommates   Prior Level of Function/Work/Activity:  Independent with running and Equestrian  Previous Treatment Approaches:          Prior PT, she never got her prescribed orthotics   Current Medications:    Current Outpatient Prescriptions:     Lisdexamfetamine (VYVANSE) 50 mg cap, Take 50 mg by mouth every morning.   \"for anxiety\", Disp: , Rfl:    Date Last Reviewed:  3/13/2018   EXAMINATION:   Observation/Orthostatic Postural Assessment:          Left foot purple coloration (especially 1st digit), scar is clean and healed, toe nail dry and cracking, cracking skin lateral foot   Palpation:          Sensitive with light touch left plantar surface   ROM:     AROM/PROM  DF R 20/25 L 10/15  PF R 45/60 L 15/20   IN R 30/40 L 15/20  EV R 20/25 L 5/10   First MTP flexion R 45/50 L 10/15 deg with pain  First MTP extension R 45/60 deg L 5/10 deg with pain    1/23/18  AROM and PROM WNL L DF/DE/IN/EV/first ray MTP ext and flexion   Strength:     mmt  Hip abduction 4/5 B  Hip extension 4/5 B  Ankle DF R 5/5 L 4-/5  Ankle PF R 5/5 L 4-/5  Ankle IN R 5/5 L 4-/5  Ankle EV R 5/5 L 4-/5    1/23/18 mmt  Ankle DF L 4+/5  Ankle PF L (mmt not WB testing)4+/5  Ankle IN L 4+/5  Ankle EV L 4+/5    3/13/18  Hip abduction 4+/5 B   Special Tests:          n/a  Neurological Screen:        Sensation: llight touch intact   Functional Mobility:         Gait/Ambulation:  Immobilizer boot left ankle with increased L LE ER with stance and decreased hip extension         3/13/18Functional lunge: R good L knee valgus and navicular drop   Balance:          SLB unable L LE with pain R good          3/13/18 SLB R good L minimal navicular drop with hip IR and knee valgus   Body Structures Involved:  1. Bones  2. Joints  3. Muscles Body Functions Affected:  1. Neuromusculoskeletal  2. Movement Related Activities and Participation Affected:  1. General Tasks and Demands  2. Mobility  3. Self Care  4. Community, Social and Civic Life   CLINICAL PRESENTATION:   CLINICAL DECISION MAKING:   Outcome Measure: Tool Used: Lower Extremity Functional Scale (LEFS)  Score:  Initial: 33/80 Most Recent: 48/80 (Date: -3/13/18 )   Interpretation of Score: 20 questions each scored on a 5 point scale with 0 representing \"extreme difficulty or unable to perform\" and 4 representing \"no difficulty\". The lower the score, the greater the functional disability. 80/80 represents no disability. Minimal detectable change is 9 points. Score 80 79-63 62-48 47-32 31-16 15-1 0   Modifier CH CI CJ CK CL CM CN       · Patient is expected to demonstrate progress in strength, range of motion, balance, coordination and functional technique to increase independence with with gait. Reason for Services/Other Comments:  · Patient has been observed to have decreased strength and ROM before, during or after an intervention. TREATMENT:   (In addition to Assessment/Re-Assessment sessions the following treatments were rendered)  THERAPEUTIC EXERCISE: (see grid for minutes):  Exercises per grid below to improve mobility and strength. Required moderate visual, verbal, manual and tactile cues to promote proper body alignment, promote proper body posture and promote proper body mechanics. Progressed resistance, range and repetitions as indicated. MANUAL THERAPY: (see grid for minutes): Joint mobilization and Soft tissue mobilization was utilized and necessary because of the patient's restricted joint motion, painful spasm and loss of articular motion. MODALITIES: (see grid for minutes): *  Ultrasound was used today secondary to the patient having symptomatic soft tissue calcification. Ultrasound was used today to reduce pain, reduce spasm, reduce joint stiffness, increase muscle flexibility, increase tendon flexibility and increase ligament flexibility. *  Iontophoresis was used today secondary to the patient having thick adhesive scars and was used for the topical delivery of dexa into the patient's to reduce pain. *  Electrical Stimulation Therapy (IFC) was provided with intensity adjusted throughout treatment to patient tolerance. to reduce pain       *  Cold Pack Therapy in order to provide analgesia and reduce inflammation and edema. *  Hot Pack Therapy in order to relieve muscle spasm.      Date: 1/11/18 1/16/18  (visit 2) 1/19/18  (visit 3) 1/23/18  (visit 4) 1/26/18  visit 5) 1/29/18  (visit 6) 2/1/18  (visit 7) 2/5/18  (visit 8) 2/7/18  (visit 9) 2/15/18  (visit 10)PN 2/22/18  (visit 11) 3/1/18  (visit 12) 3/13/18  (visit 13)PN   Modalities: 8 min 8 mins 8 min  8 min  10 mins 10 mins 10 minutes     10 ins   US with keto 8 min 1 mhz x 1.2 ntensity L PF surface around sesmoid 8' 1 mhx x 1.5 intensity with keto repeat repeat            Whirlpool cold then warm      5' each with ankle pumps 5' in each whirlpool to de--sensitize to temperature changes  5' each 5 min each  5 min each   5 min cold and hot whilrpool                    Therapeutic Exercise: 10 mins     30 min  30 min 35 minutes 35 min 35 min 25 min 30 mins 30 min    Towel crunches and inv/ev 1' each     TB side steps with TB on ankles 3 laps x 20 ft B RDL 4# MB taps to table 3x12   Lateral step ups 2x10 each with hip resistance 15#  15# 3x10 L LE 2x10 R LE Retro TM 10% inclune 1.5 mph x 5 min  TM 10% incline lateral walks x5 min total alt every 30 sec lateral    RDL 8# MB taps to table        2x15 SL RDL and UE row 15# cable machine 3x10 B  Seated HR 50# B 3x15  BOSU lateral step ups with chop 4# 3x10 B   Longport board 4 dir 30x each     Slider rettro lunges 3x10 B SLB with hip ER MB to wall tap 3x10 4# SLB with MB wall taps 8# 2x10 TB badn above knees with SLB hip ER 2x10 B 3x10 B  SL calms with green TB 30x Side steps with grey TB ankle around in squat 3 laps   Long sit Ankle pumps with mirror 1'     Wall falls with heel down for eccentric gastroc 3x10  hold   TB side steps and monster walks 3 laps each forward, backward, and lateral  3 laps each  BOSU static squat with lateral shifrs 3x30 sec holds    Brule  1'     SLB with TB HABD 3x10 B UE Lunge reto with L LE forward and R LE on slider 3x10 R LE Lunge retro LLE forward and RLE on slider 2x15;  Single squat on L with RLE on slider  2x15 Lunge with back leg on SB 3x10 to avoid 1st ray extension   LP RIP 40# x 2' and DL HR 60# 3x15   Monster walks forward and backward 3 laps each dir grey TB around ankles   edu on HEP  D1/D2 with TB, PROM toe fexion/extension using mirror repeat   Rockboard series 20x/30 sec balance with head turn, and SL diagnonals 20x each dir Repeat, squats 10x on rocboard each direction and large wobblebard 15x Squats on rockerboard each direction  x15 each; wobbleboard  x15 BOSU squats 3x10 with weights shifts at end 10\" lateral and forward repeat  SLB with hip ER with black TB 3x10 B, Sl squat with SB tap to ground ro 3x10 B SL on BOSU black side with biceps curls 5# 3x10 B   treadmill        With RLE off treadmill and LLE on treadmill  4 minutes at .8 mph   x4' x3' Next visit retro TM Split lunge 3x10 L LE only     Small circleboard      10x3 each dir with alt foot on ground 30x each x30 each seated  30 x SLB       Proprioceptive Activities:                                                                Manual Therapy:  10 mis 10 min 12 mins 15 min 8 mins 15 min  15 minutes 10 mins 10 min 15 min 10 mins 15 min   Joints stabs   D1 and D2 3x10 each  repeat repeat Scar massage  repeat Resisted D1 and D2 joint stabilzation of ankle 3x10 each direction   repeat Scar massage plantar surface repeat 1st ray prox and distal infer mobs, melonie ev mobs, scar mobs    MFR intrinsics between MT and prox first ray PF mobs    5' repeat repeat Scar tissue massage to incision plantar surface repeat repeat       Aquatic therapy:  45 mins 45 mins 45 mins 45 mins           Deep Well Jogging  30'' running, backward frog, side crab, scissor, jacks 30' running, backward frog, side crab, scissor, jacks, punches with belt, weights UE press downs without belt Repeat wit increased speed and using UE dumbells instead of jogging belt for support 3 laps each with 3# ankle weights and belt, 2 laps with DB weights in arms and not ankle weights           Gait 4 dir  3 laps each  3 laps each  3 laps each 3 laps each            FIn at the end of pool  5x1' 5x1' 3' Fig 4 stretch, gastroc stretch [id number] sec holds each            Step ups    Forward and lateral 15x each L LE 15x each           SLB with alternate UE ABD and SLB L LE with hip opener rotation to right (hip ER)  3x10 with L LE SLB, 30x ER with paddles resistance Repeat with paddle resistance 30x each  Repeat + SLB L LE with R LE hip circles (4#) 3x1- eaech 10x each           HEP: Provided HEP handout on 1/11/18  Treatment/Session Assessment:  She arrives at clinic without antalgia. She reports compliance wearing her orthotics to control her pain. She tolerated exercise without c/o of increased pain. She has improved knee control preventing genu valgus and hip IR with Wb exercise. She was educated to continue wearing her orthotics and con't with exercise. She is concerned that she might need to see a CRPS specialist because of feet are turning purple and she is getting full body hot flashes. Recommend hold on PT with her staying compliant with HEP and orthotics. Recommend MD consult regarding duration of CRPS symptoms. · Pain/ Symptoms: Initial:   0/10 \"Both of my feet are turning purple and my body is getting hot flashes. My sesmoid feels okay. I am having trouble with my knee control\" Post Session:  0/10 She had improve tolerance of WB exercise without antalgia ·   Compliance with Program/Exercises: compliant  · Recommendations/Intent for next treatment session: \"Next visit will focus on advancements to more challenging activities\".    Total Treatment Duration:  PT Patient Time In/Time Out  Time In: 0800  Time Out: 0900     Jim Ma, PT

## 2018-03-13 ENCOUNTER — HOSPITAL ENCOUNTER (OUTPATIENT)
Dept: PHYSICAL THERAPY | Age: 23
Discharge: HOME | End: 2018-03-13
Payer: SELF-PAY

## 2018-03-13 PROCEDURE — 97140 MANUAL THERAPY 1/> REGIONS: CPT

## 2018-03-13 PROCEDURE — 97110 THERAPEUTIC EXERCISES: CPT

## 2018-03-20 ENCOUNTER — HOSPITAL ENCOUNTER (OUTPATIENT)
Dept: PHYSICAL THERAPY | Age: 23
Discharge: HOME | End: 2018-03-20
Payer: SELF-PAY

## 2018-03-20 PROCEDURE — 97140 MANUAL THERAPY 1/> REGIONS: CPT

## 2018-03-20 NOTE — PROGRESS NOTES
REHABILITATION [facility]  : 1995 [facility] [address]  [facility], [address] [place] [address]  [phone]   [phone]        OUTPATIENT PHYSICAL THERAPY:Daily Note 3/20/2018        ICD-10: Treatment Diagnosis: Pain in left foot (M79.672)Effusion, left foot (M25.475)  Precautions/Allergies:   Erythromycin   Fall Risk Score: 0 (? 5 = High Risk)  MD Orders: Evaluation and treatment  MEDICAL/REFERRING DIAGNOSIS:  Complex regional pain syndrome I, unspecified [G90.50]  Pain in left foot [M79.672]   DATE OF ONSET: sp sesmoidectomy 17 with 6 mos hx of on/off pain prior to surgery   REFERRING PHYSICIAN: Nina Sexton MD  RETURN PHYSICIAN APPOINTMENT: TBD     INITIAL ASSESSMENT:  Ms. Sherine Mata presents with decreased strength R LE, decreased ROM, pain, and swelling L foot. Her symptoms are consistent with right s/p sesamoidectomy and complex regional pain syndrome. She has a high level of irritability and pain. She reports frequent episodes of foot swelling, pain, trophic changes with insidious onset. She will benefit from PT serivces with focus on pain control and de-sensitization so she can return to PLOF. PROBLEM LIST (Impacting functional limitations):  1. Decreased Strength  2. Decreased ADL/Functional Activities  3. Decreased Ambulation Ability/Technique  4. Decreased Balance  5. Increased Pain  6. Decreased Flexibility/Joint Mobility INTERVENTIONS PLANNED:  1. Balance Exercise  2. Cold  3. Cryotherapy  4. Electrical Stimulation  5. Gait Training  6. Heat  7. Home Exercise Program (HEP)  8. Manual Therapy  9. Range of Motion (ROM)  10. Therapeutic Activites  11. Therapeutic Exercise/Strengthening   12. Aquatics   TREATMENT PLAN:  Effective Dates: 18 TO 4/3/18. Frequency/Duration: 2-3 times a week for 12 weeks  GOALS: (Goals have been discussed and agreed upon with patient.)  Short-Term Functional Goals: Time Frame: 2 weeks   1.  Patient will be independent with HEP without pain.(MET)  2. Patient will have improved gait pattern WNL wearing gym shoes with cushion and rigid base, allowing her to ambulate 10 minutes without pain.(MET)   3. Patient will have improved SLB to 30 seconds without pain improving intrinsic foot strength. (MET)  Discharge Goals: Time Frame: 12 weeks  1. Patient will have improved LEFS to > 60/80 allowing her to return to community participation without pain.(progressing)  2. Patient will have improved mmt to 5-/5 B LE allowing her to progress to ascending/descending stairs without pain.(progressing)  3. 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 Rehabilitation Potential For Stated Goals: Good                HISTORY:   History of Present Injury/Illness (Reason for Referral):  22 y/o F with hx of left foot pain at the ball of her foot. She has been diagnosed with sesamoiditis and was making progress with PT services but had a sudden severe increase in pain and swelling and had a left sesamoidectomy on 11/29/17. She continues to have pain left foot 8/10 intensity daily. She has trophic changes and has been diagnosed with CRPS. She has easy flare ups without   Past Medical History/Comorbidities:   Ms. Jackie Hernandez  has no past medical history on file. Ms. Jackie Hernandez  has a past surgical history that includes hx orthopaedic (Left, age 17) and hx heent. Social History/Living Environment:     Independent, lives on campus with roommates   Prior Level of Function/Work/Activity:  Independent with running and Equestrian  Previous Treatment Approaches:          Prior PT, she never got her prescribed orthotics   Current Medications:    Current Outpatient Prescriptions:     Lisdexamfetamine (VYVANSE) 50 mg cap, Take 50 mg by mouth every morning.   \"for anxiety\", Disp: , Rfl:    Date Last Reviewed:  3/20/2018   EXAMINATION:   Observation/Orthostatic Postural Assessment:          Left foot purple coloration (especially 1st digit), scar is clean and healed, toe nail dry and cracking, cracking skin lateral foot   Palpation:          Sensitive with light touch left plantar surface   ROM:     AROM/PROM  DF R 20/25 L 10/15  PF R 45/60 L 15/20   IN R 30/40 L 15/20  EV R 20/25 L 5/10   First MTP flexion R 45/50 L 10/15 deg with pain  First MTP extension R 45/60 deg L 5/10 deg with pain    1/23/18  AROM and PROM WNL L DF/VT/IN/EV/first ray MTP ext and flexion   Strength:     mmt  Hip abduction 4/5 B  Hip extension 4/5 B  Ankle DF R 5/5 L 4-/5  Ankle PF R 5/5 L 4-/5  Ankle IN R 5/5 L 4-/5  Ankle EV R 5/5 L 4-/5    1/23/18 mmt  Ankle DF L 4+/5  Ankle PF L (mmt not WB testing)4+/5  Ankle IN L 4+/5  Ankle EV L 4+/5    3/13/18  Hip abduction 4+/5 B   Special Tests:          n/a  Neurological Screen:        Sensation: llight touch intact   Functional Mobility:         Gait/Ambulation:  Immobilizer boot left ankle with increased L LE ER with stance and decreased hip extension         3/13/18Functional lunge: R good L knee valgus and navicular drop   Balance:          SLB unable L LE with pain R good          3/13/18 SLB R good L minimal navicular drop with hip IR and knee valgus   Body Structures Involved:  1. Bones  2. Joints  3. Muscles Body Functions Affected:  1. Neuromusculoskeletal  2. Movement Related Activities and Participation Affected:  1. General Tasks and Demands  2. Mobility  3. Self Care  4. Community, Social and Civic Life   CLINICAL PRESENTATION:   CLINICAL DECISION MAKING:   Outcome Measure: Tool Used: Lower Extremity Functional Scale (LEFS)  Score:  Initial: 33/80 Most Recent: 48/80 (Date: -3/13/18 )   Interpretation of Score: 20 questions each scored on a 5 point scale with 0 representing \"extreme difficulty or unable to perform\" and 4 representing \"no difficulty\". The lower the score, the greater the functional disability. 80/80 represents no disability.   Minimal detectable change is 9 points. Score 80 79-63 62-48 47-32 31-16 15-1 0   Modifier CH CI CJ CK CL CM CN       · Patient is expected to demonstrate progress in strength, range of motion, balance, coordination and functional technique to increase independence with with gait. Reason for Services/Other Comments:  · Patient has been observed to have decreased strength and ROM before, during or after an intervention. TREATMENT:   (In addition to Assessment/Re-Assessment sessions the following treatments were rendered)  THERAPEUTIC EXERCISE: (see grid for minutes):  Exercises per grid below to improve mobility and strength. Required moderate visual, verbal, manual and tactile cues to promote proper body alignment, promote proper body posture and promote proper body mechanics. Progressed resistance, range and repetitions as indicated. MANUAL THERAPY: (see grid for minutes): Joint mobilization and Soft tissue mobilization was utilized and necessary because of the patient's restricted joint motion, painful spasm and loss of articular motion. MODALITIES: (see grid for minutes): *  Ultrasound was used today secondary to the patient having symptomatic soft tissue calcification. Ultrasound was used today to reduce pain, reduce spasm, reduce joint stiffness, increase muscle flexibility, increase tendon flexibility and increase ligament flexibility. *  Iontophoresis was used today secondary to the patient having thick adhesive scars and was used for the topical delivery of dexa into the patient's to reduce pain. *  Electrical Stimulation Therapy (IFC) was provided with intensity adjusted throughout treatment to patient tolerance. to reduce pain       *  Cold Pack Therapy in order to provide analgesia and reduce inflammation and edema. *  Hot Pack Therapy in order to relieve muscle spasm.      Date: 1/11/18 1/16/18  (visit 2) 1/19/18  (visit 3) 2/7/18  (visit 9) 2/15/18  (visit 10)PN 2/22/18  (visit 11) 3/1/18  (visit 12) 3/13/18  (visit 13)PN 3/20/18  (visit 14)   Modalities: 8 min 8 mins 8 min      10 ins 10 min   US with keto 8 min 1 mhz x 1.2 ntensity L PF surface around sesmoid 8' 1 mhx x 1.5 intensity with keto repeat         Whirlpool cold then warm    5 min each  5 min each   5 min cold and hot whilrpool  repeat               Therapeutic Exercise: 10 mins   35 min 35 min 25 min 30 mins 30 min     Towel crunches and inv/ev 1' each    Lateral step ups 2x10 each with hip resistance 15#  15# 3x10 L LE 2x10 R LE Retro TM 10% inclune 1.5 mph x 5 min  TM 10% incline lateral walks x5 min total alt every 30 sec lateral     RDL 8# MB taps to table    SL RDL and UE row 15# cable machine 3x10 B  Seated HR 50# B 3x15  BOSU lateral step ups with chop 4# 3x10 B    [place] board 4 dir 30x each   TB badn above knees with SLB hip ER 2x10 B 3x10 B  SL calms with green TB 30x Side steps with grey TB ankle around in squat 3 laps    Long sit Ankle pumps with mirror 1'    TB side steps and monster walks 3 laps each forward, backward, and lateral  3 laps each  BOSU static squat with lateral shifrs 3x30 sec holds     [place]  1'   Lunge with back leg on SB 3x10 to avoid 1st ray extension   LP RIP 40# x 2' and DL HR 60# 3x15   Monster walks forward and backward 3 laps each dir grey TB around ankles    edu on HEP  D1/D2 with TB, PROM toe fexion/extension using mirror repeat BOSU squats 3x10 with weights shifts at end 10\" lateral and forward repeat  SLB with hip ER with black TB 3x10 B, Sl squat with SB tap to ground ro 3x10 B SL on BOSU black side with biceps curls 5# 3x10 B    treadmill    x4' x3' Next visit retro TM Split lunge 3x10 L LE only      Small circleboard    30 x SLB        Proprioceptive Activities:                                                Manual Therapy:  10 mis 10 min 10 mins 10 min 15 min 10 mins 15 min 45 mins   Joints stabs   D1 and D2 3x10 each  repeat  repeat Scar massage plantar surface repeat 1st ray prox and distal infer mobs, melonie ev mobs, scar mobs  FDN/mFR B multifids L4-S1, LE hamstring and gastroc, HVLA L4/5 and SIJ mansip B    MFR intrinsics between MT and prox first ray PF mobs    repeat        Aquatic therapy:  45 mins 45 mins         Deep Well Jogging  30'' running, backward frog, side crab, scissor, jacks 30' running, backward frog, side crab, scissor, jacks, punches with belt, weights UE press downs without belt         Gait 4 dir  3 laps each  3 laps each          FIn at the end of pool  5x1' 5x1'         Step ups            SLB with alternate UE ABD and SLB L LE with hip opener rotation to right (hip ER)  3x10 with L LE SLB, 30x ER with paddles resistance Repeat with paddle resistance 30x each          HEP: Provided HEP handout on 1/11/18  Treatment/Session Assessment:  She tolerated session without increased pain. Con't with POC. · Pain/ Symptoms: Initial:   0/10 \"I still have the same flare ups with the CRPS but I am doing yoga, barre, and equestrian again without any worse symptoms. \" Post Session:  0/10 No increased pain ·   Compliance with Program/Exercises: compliant  · Recommendations/Intent for next treatment session: \"Next visit will focus on advancements to more challenging activities\".    Total Treatment Duration:  PT Patient Time In/Time Out  Time In: 1015  Time Out: [address] Cecile Lin

## 2018-03-21 NOTE — PROGRESS NOTES
[facility]  : 1995 [facility] [address]  [address] [place] [address].  [phone]   [phone]        OUTPATIENT PHYSICAL THERAPY:Daily Note 3/22/2018        ICD-10: Treatment Diagnosis: Pain in left foot (M79.672)Effusion, left foot (M25.475)  Precautions/Allergies:   Erythromycin   Fall Risk Score: 0 (? 5 = High Risk)  MD Orders: Evaluation and treatment  MEDICAL/REFERRING DIAGNOSIS:  Complex regional pain syndrome I, unspecified [G90.50]  Pain in left foot [M79.672]   DATE OF ONSET: sp sesmoidectomy 17 with 6 mos hx of on/off pain prior to surgery   REFERRING PHYSICIAN: Vick Miller MD  RETURN PHYSICIAN APPOINTMENT: TBD     INITIAL ASSESSMENT:  Ms. Jax Peters presents with decreased strength R LE, decreased ROM, pain, and swelling L foot. Her symptoms are consistent with right s/p sesamoidectomy and complex regional pain syndrome. She has a high level of irritability and pain. She reports frequent episodes of foot swelling, pain, trophic changes with insidious onset. She will benefit from PT serivces with focus on pain control and de-sensitization so she can return to OF. PROBLEM LIST (Impacting functional limitations):  1. Decreased Strength  2. Decreased ADL/Functional Activities  3. Decreased Ambulation Ability/Technique  4. Decreased Balance  5. Increased Pain  6. Decreased Flexibility/Joint Mobility INTERVENTIONS PLANNED:  1. Balance Exercise  2. Cold  3. Cryotherapy  4. Electrical Stimulation  5. Gait Training  6. Heat  7. Home Exercise Program (HEP)  8. Manual Therapy  9. Range of Motion (ROM)  10. Therapeutic Activites  11. Therapeutic Exercise/Strengthening   12. Aquatics   TREATMENT PLAN:  Effective Dates: 18 TO 4/3/18. Frequency/Duration: 2-3 times a week for 12 weeks  GOALS: (Goals have been discussed and agreed upon with patient.)  Short-Term Functional Goals: Time Frame: 2 weeks   1.  Patient will be independent with HEP without pain.(MET)  2. Patient will have improved gait pattern WNL wearing gym shoes with cushion and rigid base, allowing her to ambulate 10 minutes without pain.(MET)   3. Patient will have improved SLB to 30 seconds without pain improving intrinsic foot strength. (MET)  Discharge Goals: Time Frame: 12 weeks  1. Patient will have improved LEFS to > 60/80 allowing her to return to community participation without pain.(progressing)  2. Patient will have improved mmt to 5-/5 B LE allowing her to progress to ascending/descending stairs without pain.(progressing)  3. 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 Rehabilitation Potential For Stated Goals: Good                HISTORY:   History of Present Injury/Illness (Reason for Referral):  24 y/o F with hx of left foot pain at the ball of her foot. She has been diagnosed with sesamoiditis and was making progress with PT services but had a sudden severe increase in pain and swelling and had a left sesamoidectomy on 11/29/17. She continues to have pain left foot 8/10 intensity daily. She has trophic changes and has been diagnosed with CRPS. She has easy flare ups without   Past Medical History/Comorbidities:   Ms. Fe Eugene  has no past medical history on file. Ms. Fe Eugene  has a past surgical history that includes hx orthopaedic (Left, age 17) and hx heent. Social History/Living Environment:     Independent, lives on campus with roommates   Prior Level of Function/Work/Activity:  Independent with running and Equestrian  Previous Treatment Approaches:          Prior PT, she never got her prescribed orthotics   Current Medications:    Current Outpatient Prescriptions:     Lisdexamfetamine (VYVANSE) 50 mg cap, Take 50 mg by mouth every morning.   \"for anxiety\", Disp: , Rfl:    Date Last Reviewed:  3/22/2018   EXAMINATION:   Observation/Orthostatic Postural Assessment:          Left foot purple coloration (especially 1st digit), scar is clean and healed, toe nail dry and cracking, cracking skin lateral foot   Palpation:          Sensitive with light touch left plantar surface   ROM:     AROM/PROM  DF R 20/25 L 10/15  PF R 45/60 L 15/20   IN R 30/40 L 15/20  EV R 20/25 L 5/10   First MTP flexion R 45/50 L 10/15 deg with pain  First MTP extension R 45/60 deg L 5/10 deg with pain    1/23/18  AROM and PROM WNL L DF/CT/IN/EV/first ray MTP ext and flexion   Strength:     mmt  Hip abduction 4/5 B  Hip extension 4/5 B  Ankle DF R 5/5 L 4-/5  Ankle PF R 5/5 L 4-/5  Ankle IN R 5/5 L 4-/5  Ankle EV R 5/5 L 4-/5    1/23/18 mmt  Ankle DF L 4+/5  Ankle PF L (mmt not WB testing)4+/5  Ankle IN L 4+/5  Ankle EV L 4+/5    3/13/18  Hip abduction 4+/5 B   Special Tests:          n/a  Neurological Screen:        Sensation: llight touch intact   Functional Mobility:         Gait/Ambulation:  Immobilizer boot left ankle with increased L LE ER with stance and decreased hip extension         3/13/18Functional lunge: R good L knee valgus and navicular drop   Balance:          SLB unable L LE with pain R good          3/13/18 SLB R good L minimal navicular drop with hip IR and knee valgus   Body Structures Involved:  1. Bones  2. Joints  3. Muscles Body Functions Affected:  1. Neuromusculoskeletal  2. Movement Related Activities and Participation Affected:  1. General Tasks and Demands  2. Mobility  3. Self Care  4. Community, Social and Civic Life   CLINICAL PRESENTATION:   CLINICAL DECISION MAKING:   Outcome Measure: Tool Used: Lower Extremity Functional Scale (LEFS)  Score:  Initial: 33/80 Most Recent: 48/80 (Date: -3/13/18 )   Interpretation of Score: 20 questions each scored on a 5 point scale with 0 representing \"extreme difficulty or unable to perform\" and 4 representing \"no difficulty\". The lower the score, the greater the functional disability. 80/80 represents no disability.   Minimal detectable change is 9 points. Score 80 79-63 62-48 47-32 31-16 15-1 0   Modifier CH CI CJ CK CL CM CN       · Patient is expected to demonstrate progress in strength, range of motion, balance, coordination and functional technique to increase independence with with gait. Reason for Services/Other Comments:  · Patient has been observed to have decreased strength and ROM before, during or after an intervention. TREATMENT:   (In addition to Assessment/Re-Assessment sessions the following treatments were rendered)  THERAPEUTIC EXERCISE: (see grid for minutes):  Exercises per grid below to improve mobility and strength. Required moderate visual, verbal, manual and tactile cues to promote proper body alignment, promote proper body posture and promote proper body mechanics. Progressed resistance, range and repetitions as indicated. MANUAL THERAPY: (see grid for minutes): Joint mobilization and Soft tissue mobilization was utilized and necessary because of the patient's restricted joint motion, painful spasm and loss of articular motion. MODALITIES: (see grid for minutes): *  Ultrasound was used today secondary to the patient having symptomatic soft tissue calcification. Ultrasound was used today to reduce pain, reduce spasm, reduce joint stiffness, increase muscle flexibility, increase tendon flexibility and increase ligament flexibility. *  Iontophoresis was used today secondary to the patient having thick adhesive scars and was used for the topical delivery of dexa into the patient's to reduce pain. *  Electrical Stimulation Therapy (IFC) was provided with intensity adjusted throughout treatment to patient tolerance. to reduce pain       *  Cold Pack Therapy in order to provide analgesia and reduce inflammation and edema. *  Hot Pack Therapy in order to relieve muscle spasm.      Date: 1/11/18 1/16/18  (visit 2) 1/19/18  (visit 3) 2/7/18  (visit 9) 2/15/18  (visit 10)PN 2/22/18  (visit 11) 3/1/18  (visit 12) 3/13/18  (visit 13)PN 3/20/18  (visit 14) 3/22/18  (visit 15)   Modalities: 8 min 8 mins 8 min      10 ins 10 min 10 min   US with keto 8 min 1 mhz x 1.2 ntensity L PF surface around sesmoid 8' 1 mhx x 1.5 intensity with keto repeat          [place]irl[place] cold then warm    5 min each  5 min each   5 min cold and hot whilrpool  repeat repeat                Therapeutic Exercise: 10 mins   35 min 35 min 25 min 30 mins 30 min      Towel crunches and inv/ev 1' each    Lateral step ups 2x10 each with hip resistance 15#  15# 3x10 L LE 2x10 R LE Retro TM 10% inclune 1.5 mph x 5 min  TM 10% incline lateral walks x5 min total alt every 30 sec lateral      RDL 8# MB taps to table    SL RDL and UE row 15# cable machine 3x10 B  Seated HR 50# B 3x15  BOSU lateral step ups with chop 4# 3x10 B     [place] board 4 dir 30x each   TB badn above knees with SLB hip ER 2x10 B 3x10 B  SL calms with green TB 30x Side steps with grey TB ankle around in squat 3 laps     Long sit Ankle pumps with mirror 1'    TB side steps and monster walks 3 laps each forward, backward, and lateral  3 laps each  BOSU static squat with lateral shifrs 3x30 sec holds      [place]  1'   Lunge with back leg on SB 3x10 to avoid 1st ray extension   LP RIP 40# x 2' and DL HR 60# 3x15   Monster walks forward and backward 3 laps each dir grey TB around ankles     edu on HEP  D1/D2 with TB, PROM toe fexion/extension using mirror repeat BOSU squats 3x10 with weights shifts at end 10\" lateral and forward repeat  SLB with hip ER with black TB 3x10 B, Sl squat with SB tap to ground ro 3x10 B SL on BOSU black side with biceps curls 5# 3x10 B     treadmill    x4' x3' Next visit retro TM Split lunge 3x10 L LE only       Small circleboard    30 x SLB         Proprioceptive Activities:                                                    Manual Therapy:  10 mis 10 min 10 mins 10 min 15 min 10 mins 15 min 45 mins 30 miins   Joints stabs   D1 and D2 3x10 each  repeat  repeat Scar massage plantar surface repeat 1st ray prox and distal infer mobs, melonie ev mobs, scar mobs  FDN/mFR B multifids L4-S1, LE hamstring and gastroc, HVLA L4/5 and SIJ mansip B  repeat   MFR intrinsics between MT and prox first ray PF mobs    repeat         Aquatic therapy:  45 mins 45 mins          Deep Well Jogging  30'' running, backward frog, side crab, scissor, jacks 30' running, backward frog, side crab, scissor, jacks, punches with belt, weights UE press downs without belt          Gait 4 dir  3 laps each  3 laps each           FIn at the end of pool  5x1' 5x1'          Step ups             SLB with alternate UE ABD and SLB L LE with hip opener rotation to right (hip ER)  3x10 with L LE SLB, 30x ER with paddles resistance Repeat with paddle resistance 30x each           HEP: Provided HEP handout on 1/11/18  Treatment/Session Assessment: She is making symptomatic improvements with FDN and manipulation. She is compliant with her HEP. Con't with POC. · Pain/ Symptoms: Initial:   0/10 \"My feet feel better. I think the dry needling helped\" Post Session:  0/10 No increased pain ·   Compliance with Program/Exercises: compliant  · Recommendations/Intent for next treatment session: \"Next visit will focus on advancements to more challenging activities\".    Total Treatment Duration:  PT Patient Time In/Time Out  Time In: 0935  Time Out: [address], PT

## 2018-03-22 ENCOUNTER — HOSPITAL ENCOUNTER (OUTPATIENT)
Dept: PHYSICAL THERAPY | Age: 23
Discharge: HOME | End: 2018-03-22
Payer: SELF-PAY

## 2018-03-22 PROCEDURE — 97140 MANUAL THERAPY 1/> REGIONS: CPT

## 2018-03-26 ENCOUNTER — HOSPITAL ENCOUNTER (OUTPATIENT)
Dept: PHYSICAL THERAPY | Age: 23
Discharge: HOME | End: 2018-03-26
Payer: SELF-PAY

## 2018-03-26 PROCEDURE — 97140 MANUAL THERAPY 1/> REGIONS: CPT

## 2018-03-26 NOTE — PROGRESS NOTES
REHABILITATION [facility]  : 1995 [facility] [address]  [address] [facility] [place].  [phone]   [phone]        OUTPATIENT PHYSICAL THERAPY:Daily Note 3/26/2018        ICD-10: Treatment Diagnosis: Pain in left foot (M79.672)Effusion, left foot (M25.475)  Precautions/Allergies:   Erythromycin   Fall Risk Score: 0 (? 5 = High Risk)  MD Orders: Evaluation and treatment  MEDICAL/REFERRING DIAGNOSIS:  Complex regional pain syndrome I, unspecified [G90.50]  Pain in left foot [M79.672]   DATE OF ONSET: sp sesmoidectomy 17 with 6 mos hx of on/off pain prior to surgery   REFERRING PHYSICIAN: Manju Meneses MD  RETURN PHYSICIAN APPOINTMENT: TBD     INITIAL ASSESSMENT:  Ms. Damaris Beckham presents with decreased strength R LE, decreased ROM, pain, and swelling L foot. Her symptoms are consistent with right s/p sesamoidectomy and complex regional pain syndrome. She has a high level of irritability and pain. She reports frequent episodes of foot swelling, pain, trophic changes with insidious onset. She will benefit from PT serivces with focus on pain control and de-sensitization so she can return to PLOF. PROBLEM LIST (Impacting functional limitations):  1. Decreased Strength  2. Decreased ADL/Functional Activities  3. Decreased Ambulation Ability/Technique  4. Decreased Balance  5. Increased Pain  6. Decreased Flexibility/Joint Mobility INTERVENTIONS PLANNED:  1. Balance Exercise  2. Cold  3. Cryotherapy  4. Electrical Stimulation  5. Gait Training  6. Heat  7. Home Exercise Program (HEP)  8. Manual Therapy  9. Range of Motion (ROM)  10. Therapeutic Activites  11. Therapeutic Exercise/Strengthening   12. Aquatics   TREATMENT PLAN:  Effective Dates: 18 TO 4/3/18. Frequency/Duration: 2-3 times a week for 12 weeks  GOALS: (Goals have been discussed and agreed upon with patient.)  Short-Term Functional Goals: Time Frame: 2 weeks   1.  Patient will be independent with HEP without pain.(MET)  2. Patient will have improved gait pattern WNL wearing gym shoes with cushion and rigid base, allowing her to ambulate 10 minutes without pain.(MET)   3. Patient will have improved SLB to 30 seconds without pain improving intrinsic foot strength. (MET)  Discharge Goals: Time Frame: 12 weeks  1. Patient will have improved LEFS to > 60/80 allowing her to return to community participation without pain.(progressing)  2. Patient will have improved mmt to 5-/5 B LE allowing her to progress to ascending/descending stairs without pain.(progressing)  3. 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 Rehabilitation Potential For Stated Goals: Good                HISTORY:   History of Present Injury/Illness (Reason for Referral):  24 y/o F with hx of left foot pain at the ball of her foot. She has been diagnosed with sesamoiditis and was making progress with PT services but had a sudden severe increase in pain and swelling and had a left sesamoidectomy on 11/29/17. She continues to have pain left foot 8/10 intensity daily. She has trophic changes and has been diagnosed with CRPS. She has easy flare ups without   Past Medical History/Comorbidities:   Ms. Toro Randhawa  has no past medical history on file. Ms. Toro Randhawa  has a past surgical history that includes hx orthopaedic (Left, age 17) and hx heent. Social History/Living Environment:     Independent, lives on campus with roommates   Prior Level of Function/Work/Activity:  Independent with running and Equestrian  Previous Treatment Approaches:          Prior PT, she never got her prescribed orthotics   Current Medications:    Current Outpatient Prescriptions:     Lisdexamfetamine (VYVANSE) 50 mg cap, Take 50 mg by mouth every morning.   \"for anxiety\", Disp: , Rfl:    Date Last Reviewed:  3/26/2018   EXAMINATION:   Observation/Orthostatic Postural Assessment:          Left foot purple coloration (especially 1st digit), scar is clean and healed, toe nail dry and cracking, cracking skin lateral foot   Palpation:          Sensitive with light touch left plantar surface   ROM:     AROM/PROM  DF R 20/25 L 10/15  PF R 45/60 L 15/20   IN R 30/40 L 15/20  EV R 20/25 L 5/10   First MTP flexion R 45/50 L 10/15 deg with pain  First MTP extension R 45/60 deg L 5/10 deg with pain    1/23/18  AROM and PROM WNL L DF/CO/IN/EV/first ray MTP ext and flexion   Strength:     mmt  Hip abduction 4/5 B  Hip extension 4/5 B  Ankle DF R 5/5 L 4-/5  Ankle PF R 5/5 L 4-/5  Ankle IN R 5/5 L 4-/5  Ankle EV R 5/5 L 4-/5    1/23/18 mmt  Ankle DF L 4+/5  Ankle PF L (mmt not WB testing)4+/5  Ankle IN L 4+/5  Ankle EV L 4+/5    3/13/18  Hip abduction 4+/5 B   Special Tests:          n/a  Neurological Screen:        Sensation: llight touch intact   Functional Mobility:         Gait/Ambulation:  Immobilizer boot left ankle with increased L LE ER with stance and decreased hip extension         3/13/18Functional lunge: R good L knee valgus and navicular drop   Balance:          SLB unable L LE with pain R good          3/13/18 SLB R good L minimal navicular drop with hip IR and knee valgus   Body Structures Involved:  1. Bones  2. Joints  3. Muscles Body Functions Affected:  1. Neuromusculoskeletal  2. Movement Related Activities and Participation Affected:  1. General Tasks and Demands  2. Mobility  3. Self Care  4. Community, Social and Civic Life   CLINICAL PRESENTATION:   CLINICAL DECISION MAKING:   Outcome Measure: Tool Used: Lower Extremity Functional Scale (LEFS)  Score:  Initial: 33/80 Most Recent: 48/80 (Date: -3/13/18 )   Interpretation of Score: 20 questions each scored on a 5 point scale with 0 representing \"extreme difficulty or unable to perform\" and 4 representing \"no difficulty\". The lower the score, the greater the functional disability. 80/80 represents no disability.   Minimal detectable change is 9 points. Score 80 79-63 62-48 47-32 31-16 15-1 0   Modifier CH CI CJ CK CL CM CN       · Patient is expected to demonstrate progress in strength, range of motion, balance, coordination and functional technique to increase independence with with gait. Reason for Services/Other Comments:  · Patient has been observed to have decreased strength and ROM before, during or after an intervention. TREATMENT:   (In addition to Assessment/Re-Assessment sessions the following treatments were rendered)  THERAPEUTIC EXERCISE: (see grid for minutes):  Exercises per grid below to improve mobility and strength. Required moderate visual, verbal, manual and tactile cues to promote proper body alignment, promote proper body posture and promote proper body mechanics. Progressed resistance, range and repetitions as indicated. MANUAL THERAPY: (see grid for minutes): Joint mobilization and Soft tissue mobilization was utilized and necessary because of the patient's restricted joint motion, painful spasm and loss of articular motion. MODALITIES: (see grid for minutes): *  Ultrasound was used today secondary to the patient having symptomatic soft tissue calcification. Ultrasound was used today to reduce pain, reduce spasm, reduce joint stiffness, increase muscle flexibility, increase tendon flexibility and increase ligament flexibility. *  Iontophoresis was used today secondary to the patient having thick adhesive scars and was used for the topical delivery of dexa into the patient's to reduce pain. *  Electrical Stimulation Therapy (IFC) was provided with intensity adjusted throughout treatment to patient tolerance. to reduce pain       *  Cold Pack Therapy in order to provide analgesia and reduce inflammation and edema. *  Hot Pack Therapy in order to relieve muscle spasm.      Date: 1/11/18 1/16/18  (visit 2) 1/19/18  (visit 3) 2/7/18  (visit 9) 2/15/18  (visit 10)PN 2/22/18  (visit 11) 3/1/18  (visit 12) 3/13/18  (visit 13)PN 3/20/18  (visit 14) 3/22/18  (visit 15) 3/26/18  (visit 16)   Modalities: 8 min 8 mins 8 min      10 ins 10 min 10 min 10 min   US with keto 8 min 1 mhz x 1.2 ntensity L PF surface around sesmoid 8' 1 mhx x 1.5 intensity with keto repeat           Whirlpool cold then warm    5 min each  5 min each   5 min cold and hot whilrpool  repeat repeat repeat                 Therapeutic Exercise: 10 mins   35 min 35 min 25 min 30 mins 30 min       Towel crunches and inv/ev 1' each    Lateral step ups 2x10 each with hip resistance 15#  15# 3x10 L LE 2x10 R LE Retro TM 10% inclune 1.5 mph x 5 min  TM 10% incline lateral walks x5 min total alt every 30 sec lateral       RDL 8# MB taps to table    SL RDL and UE row 15# cable machine 3x10 B  Seated HR 50# B 3x15  BOSU lateral step ups with chop 4# 3x10 B      [place] board 4 dir 30x each   TB badn above knees with SLB hip ER 2x10 B 3x10 B  SL calms with green TB 30x Side steps with grey TB ankle around in squat 3 laps      Long sit Ankle pumps with mirror 1'    TB side steps and monster walks 3 laps each forward, backward, and lateral  3 laps each  BOSU static squat with lateral shifrs 3x30 sec holds       [place]  1'   Lunge with back leg on SB 3x10 to avoid 1st ray extension   LP RIP 40# x 2' and DL HR 60# 3x15   Monster walks forward and backward 3 laps each dir grey TB around ankles      edu on HEP  D1/D2 with TB, PROM toe fexion/extension using mirror repeat BOSU squats 3x10 with weights shifts at end 10\" lateral and forward repeat  SLB with hip ER with black TB 3x10 B, Sl squat with SB tap to ground ro 3x10 B SL on BOSU black side with biceps curls 5# 3x10 B      treadmill    x4' x3' Next visit retro TM Split lunge 3x10 L LE only        Small circleboard    30 x SLB          Proprioceptive Activities:                                                        Manual Therapy:  10 mis 10 min 10 mins 10 min 15 min 10 mins 15 min 45 mins 30 miins 30 mins Joints stabs   D1 and D2 3x10 each  repeat  repeat Scar massage plantar surface repeat 1st ray prox and distal infer mobs, melonie ev mobs, scar mobs  FDN/mFR B multifids L4-S1, LE hamstring and gastroc, HVLA L4/5 and SIJ mansip B  repeat repeat   MFR intrinsics between MT and prox first ray PF mobs    repeat          Aquatic therapy:  45 mins 45 mins           Deep Well Jogging  30'' running, backward frog, side crab, scissor, jacks 30' running, backward frog, side crab, scissor, jacks, punches with belt, weights UE press downs without belt           Gait 4 dir  3 laps each  3 laps each            FIn at the end of pool  5x1' 5x1'           Step ups              SLB with alternate UE ABD and SLB L LE with hip opener rotation to right (hip ER)  3x10 with L LE SLB, 30x ER with paddles resistance Repeat with paddle resistance 30x each            HEP: Provided HEP handout on 1/11/18  Treatment/Session Assessment: She is making steady progress. She reports compliance with mirror biofeedback, rice contrast baths, and LE strengthening exercise. She reports minimal flare ups after starting equestrian. She thinks that the dry needling has helped her a lot. Con't with POC. · Pain/ Symptoms: Initial:   0/10 \"My feet hurt badly yesterday. I was studying because I have 2 exams this week. I think the CRPS flare ups involve my stress\" Post Session:  0/10 No increased pain ·   Compliance with Program/Exercises: compliant  · Recommendations/Intent for next treatment session: \"Next visit will focus on advancements to more challenging activities\".    Total Treatment Duration:  PT Patient Time In/Time Out  Time In: 0800  Time Out: [id number] [facility] [place], PT

## 2018-03-28 NOTE — PROGRESS NOTES
REHABILITATION [facility]  : 1995 [facility] [address]  [address] [facility] [place]  [phone]   [phone]        OUTPATIENT PHYSICAL THERAPY:Daily Note 3/29/2018        ICD-10: Treatment Diagnosis: Pain in left foot (M79.672)Effusion, left foot (M25.475)  Precautions/Allergies:   Erythromycin   Fall Risk Score: 0 (? 5 = High Risk)  MD Orders: Evaluation and treatment  MEDICAL/REFERRING DIAGNOSIS:  Complex regional pain syndrome I, unspecified [G90.50]  Pain in left foot [M79.672]   DATE OF ONSET: sp sesmoidectomy 17 with 6 mos hx of on/off pain prior to surgery   REFERRING PHYSICIAN: Zulay Fontaine MD  RETURN PHYSICIAN APPOINTMENT: TBD     INITIAL ASSESSMENT:  Ms. Caridad Marquez presents with decreased strength R LE, decreased ROM, pain, and swelling L foot. Her symptoms are consistent with right s/p sesamoidectomy and complex regional pain syndrome. She has a high level of irritability and pain. She reports frequent episodes of foot swelling, pain, trophic changes with insidious onset. She will benefit from PT serivces with focus on pain control and de-sensitization so she can return to PLOF. PROBLEM LIST (Impacting functional limitations):  1. Decreased Strength  2. Decreased ADL/Functional Activities  3. Decreased Ambulation Ability/Technique  4. Decreased Balance  5. Increased Pain  6. Decreased Flexibility/Joint Mobility INTERVENTIONS PLANNED:  1. Balance Exercise  2. Cold  3. Cryotherapy  4. Electrical Stimulation  5. Gait Training  6. Heat  7. Home Exercise Program (HEP)  8. Manual Therapy  9. Range of Motion (ROM)  10. Therapeutic Activites  11. Therapeutic Exercise/Strengthening   12. Aquatics   TREATMENT PLAN:  Effective Dates: 18 TO 4/3/18. Frequency/Duration: 2-3 times a week for 12 weeks  GOALS: (Goals have been discussed and agreed upon with patient.)  Short-Term Functional Goals: Time Frame: 2 weeks   1.  Patient will be independent with HEP without pain.(MET)  2. Patient will have improved gait pattern WNL wearing gym shoes with cushion and rigid base, allowing her to ambulate 10 minutes without pain.(MET)   3. Patient will have improved SLB to 30 seconds without pain improving intrinsic foot strength. (MET)  Discharge Goals: Time Frame: 12 weeks  1. Patient will have improved LEFS to > 60/80 allowing her to return to community participation without pain.(progressing)  2. Patient will have improved mmt to 5-/5 B LE allowing her to progress to ascending/descending stairs without pain.(progressing)  3. 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 Rehabilitation Potential For Stated Goals: Good                HISTORY:   History of Present Injury/Illness (Reason for Referral):  22 y/o F with hx of left foot pain at the ball of her foot. She has been diagnosed with sesamoiditis and was making progress with PT services but had a sudden severe increase in pain and swelling and had a left sesamoidectomy on 11/29/17. She continues to have pain left foot 8/10 intensity daily. She has trophic changes and has been diagnosed with CRPS. She has easy flare ups without   Past Medical History/Comorbidities:   Ms. Ariana Warren  has no past medical history on file. Ms. Ariana Warren  has a past surgical history that includes hx orthopaedic (Left, age 17) and hx heent. Social History/Living Environment:     Independent, lives on campus with roommates   Prior Level of Function/Work/Activity:  Independent with running and Equestrian  Previous Treatment Approaches:          Prior PT, she never got her prescribed orthotics   Current Medications:    Current Outpatient Prescriptions:     Lisdexamfetamine (VYVANSE) 50 mg cap, Take 50 mg by mouth every morning.   \"for anxiety\", Disp: , Rfl:    Date Last Reviewed:  3/29/2018   EXAMINATION:   Observation/Orthostatic Postural Assessment:          Left foot purple coloration (especially 1st digit), scar is clean and healed, toe nail dry and cracking, cracking skin lateral foot   Palpation:          Sensitive with light touch left plantar surface   ROM:     AROM/PROM  DF R 20/25 L 10/15  PF R 45/60 L 15/20   IN R 30/40 L 15/20  EV R 20/25 L 5/10   First MTP flexion R 45/50 L 10/15 deg with pain  First MTP extension R 45/60 deg L 5/10 deg with pain    1/23/18  AROM and PROM WNL L DF/RI/IN/EV/first ray MTP ext and flexion   Strength:     mmt  Hip abduction 4/5 B  Hip extension 4/5 B  Ankle DF R 5/5 L 4-/5  Ankle PF R 5/5 L 4-/5  Ankle IN R 5/5 L 4-/5  Ankle EV R 5/5 L 4-/5    1/23/18 mmt  Ankle DF L 4+/5  Ankle PF L (mmt not WB testing)4+/5  Ankle IN L 4+/5  Ankle EV L 4+/5    3/13/18  Hip abduction 4+/5 B   Special Tests:          n/a  Neurological Screen:        Sensation: llight touch intact   Functional Mobility:         Gait/Ambulation:  Immobilizer boot left ankle with increased L LE ER with stance and decreased hip extension         3/13/18Functional lunge: R good L knee valgus and navicular drop   Balance:          SLB unable L LE with pain R good          3/13/18 SLB R good L minimal navicular drop with hip IR and knee valgus   Body Structures Involved:  1. Bones  2. Joints  3. Muscles Body Functions Affected:  1. Neuromusculoskeletal  2. Movement Related Activities and Participation Affected:  1. General Tasks and Demands  2. Mobility  3. Self Care  4. Community, Social and Civic Life   CLINICAL PRESENTATION:   CLINICAL DECISION MAKING:   Outcome Measure: Tool Used: Lower Extremity Functional Scale (LEFS)  Score:  Initial: 33/80 Most Recent: 48/80 (Date: -3/13/18 )   Interpretation of Score: 20 questions each scored on a 5 point scale with 0 representing \"extreme difficulty or unable to perform\" and 4 representing \"no difficulty\". The lower the score, the greater the functional disability. 80/80 represents no disability.   Minimal detectable change is 9 points. Score 80 79-63 62-48 47-32 31-16 15-1 0   Modifier CH CI CJ CK CL CM CN       · Patient is expected to demonstrate progress in strength, range of motion, balance, coordination and functional technique to increase independence with with gait. Reason for Services/Other Comments:  · Patient has been observed to have decreased strength and ROM before, during or after an intervention. TREATMENT:   (In addition to Assessment/Re-Assessment sessions the following treatments were rendered)  THERAPEUTIC EXERCISE: (see grid for minutes):  Exercises per grid below to improve mobility and strength. Required moderate visual, verbal, manual and tactile cues to promote proper body alignment, promote proper body posture and promote proper body mechanics. Progressed resistance, range and repetitions as indicated. MANUAL THERAPY: (see grid for minutes): Joint mobilization and Soft tissue mobilization was utilized and necessary because of the patient's restricted joint motion, painful spasm and loss of articular motion. MODALITIES: (see grid for minutes): *  Ultrasound was used today secondary to the patient having symptomatic soft tissue calcification. Ultrasound was used today to reduce pain, reduce spasm, reduce joint stiffness, increase muscle flexibility, increase tendon flexibility and increase ligament flexibility. *  Iontophoresis was used today secondary to the patient having thick adhesive scars and was used for the topical delivery of dexa into the patient's to reduce pain. *  Electrical Stimulation Therapy (IFC) was provided with intensity adjusted throughout treatment to patient tolerance. to reduce pain       *  Cold Pack Therapy in order to provide analgesia and reduce inflammation and edema. *  Hot Pack Therapy in order to relieve muscle spasm.      Date: 1/11/18 1/16/18  (visit 2) 1/19/18  (visit 3) 2/7/18  (visit 9) 2/15/18  (visit 10)PN 2/22/18  (visit 11) 3/1/18  (visit 12) 3/13/18  (visit 13)PN 3/20/18  (visit 14) 3/22/18  (visit 15) 3/26/18  (visit 16) 3/29/18  (visit 17)   Modalities: 8 min 8 mins 8 min      10 ins 10 min 10 min 10 min 10 min   US with keto 8 min 1 mhz x 1.2 ntensity L PF surface around sesmoid 8' 1 mhx x 1.5 intensity with keto repeat            Whirlpool cold then warm    5 min each  5 min each   5 min cold and hot whilrpool  repeat repeat repeat 5 min each B LE in cold then hot whirlpool                  Therapeutic Exercise: 10 mins   35 min 35 min 25 min 30 mins 30 min        Towel crunches and inv/ev 1' each    Lateral step ups 2x10 each with hip resistance 15#  15# 3x10 L LE 2x10 R LE Retro TM 10% inclune 1.5 mph x 5 min  TM 10% incline lateral walks x5 min total alt every 30 sec lateral        RDL 8# MB taps to table    SL RDL and UE row 15# cable machine 3x10 B  Seated HR 50# B 3x15  BOSU lateral step ups with chop 4# 3x10 B       [place] board 4 dir 30x each   TB badn above knees with SLB hip ER 2x10 B 3x10 B  SL calms with green TB 30x Side steps with grey TB ankle around in squat 3 laps       Long sit Ankle pumps with mirror 1'    TB side steps and monster walks 3 laps each forward, backward, and lateral  3 laps each  BOSU static squat with lateral shifrs 3x30 sec holds        [place]  1'   Lunge with back leg on SB 3x10 to avoid 1st ray extension   LP RIP 40# x 2' and DL HR 60# 3x15   Monster walks forward and backward 3 laps each dir grey TB around ankles       edu on HEP  D1/D2 with TB, PROM toe fexion/extension using mirror repeat BOSU squats 3x10 with weights shifts at end 10\" lateral and forward repeat  SLB with hip ER with black TB 3x10 B, Sl squat with SB tap to ground ro 3x10 B SL on BOSU black side with biceps curls 5# 3x10 B       treadmill    x4' x3' Next visit retro TM Split lunge 3x10 L LE only         Small circleboard    30 x SLB           Proprioceptive Activities:                                                            Manual Therapy:  10 mis 10 min 10 mins 10 min 15 min 10 mins 15 min 45 mins 30 miins 30 mins 40 min   Fibular and tibia mobs , prone hip ER MET with manual pressure PA to hip            5 min   Joints stabs   D1 and D2 3x10 each  repeat  repeat Scar massage plantar surface repeat 1st ray prox and distal infer mobs, melonie ev mobs, scar mobs  FDN/mFR B multifids L4-S1, LE hamstring and gastroc, HVLA L4/5 and SIJ mansip B  repeat repeat FDN B L/S L4-S1 B lateral hamstring proximal, medial hamstring medial, B posterior peroneals proximal, and distal gastroc/soleous   MFR intrinsics between MT and prox first ray PF mobs    repeat        L/S HVLA  X 1 B in sideyling with cavitations. Aquatic therapy:  45 mins 45 mins            Deep Well Jogging  30'' running, backward frog, side crab, scissor, jacks 30' running, backward frog, side crab, scissor, jacks, punches with belt, weights UE press downs without belt            Gait 4 dir  3 laps each  3 laps each             FIn at the end of pool  5x1' 5x1'            Step ups               SLB with alternate UE ABD and SLB L LE with hip opener rotation to right (hip ER)  3x10 with L LE SLB, 30x ER with paddles resistance Repeat with paddle resistance 30x each             HEP: Provided HEP handout on 1/11/18  Treatment/Session Assessment: She reports good relief from her CRPS symptoms with the dry needling in her low back and posterior legs and L/S manipulations. She is taking the super B vitamin that her doctor recommended but she wants to hold on trying the CBD oil or the Neurontin because she wants to avoid any side effects. She wants to still do the PT since it is helping but reduce her frequency to once a week and see how she does with that. She continues to have B knee genu valgus with hip IR that is improving with awarenss of body mechanics and functional strengthening. She reports difficulty with maintaining a neutral knee position without the ball of her foot elevated. Some manual therapy was provided today to mobilzation prox fib and distal tib/fib that may improved ease of holding the ball of her foot on the ground with a neutral knee position. Con't with POC. · Pain/ Symptoms: Initial:   0/10 \"I still get the CRPS flare ups in my left foot if I am sitting and get stressed when I study. My foot will turn purple and feel cold or hot. I started to ride my horse and I have been a little sore in my sesmoid but I recover from the pain. I am doing the mirror exercises and contrast baths\" Post Session:  0/10 No increased pain ·   Compliance with Program/Exercises: compliant  · Recommendations/Intent for next treatment session: \"Next visit will focus on advancements to more challenging activities\".    Total Treatment Duration:  PT Patient Time In/Time Out  Time In: 0930  Time Out: [address], PT

## 2018-03-29 ENCOUNTER — HOSPITAL ENCOUNTER (OUTPATIENT)
Dept: PHYSICAL THERAPY | Age: 23
Discharge: HOME | End: 2018-03-29
Payer: SELF-PAY

## 2018-03-29 PROCEDURE — 97140 MANUAL THERAPY 1/> REGIONS: CPT

## 2018-04-03 ENCOUNTER — HOSPITAL ENCOUNTER (OUTPATIENT)
Dept: PHYSICAL THERAPY | Age: 23
Discharge: HOME | End: 2018-04-03
Payer: SELF-PAY

## 2018-04-03 PROCEDURE — 97140 MANUAL THERAPY 1/> REGIONS: CPT

## 2018-04-03 PROCEDURE — 97022 WHIRLPOOL THERAPY: CPT

## 2018-04-03 NOTE — PROGRESS NOTES
REHABILITATION [facility]  : 1995 [facility] [address]  [address] [facility] [place].  [phone]   [phone]        OUTPATIENT PHYSICAL THERAPY:Daily Note 4/3/2018        ICD-10: Treatment Diagnosis: Pain in left foot (M79.672)Effusion, left foot (M25.475)  Precautions/Allergies:   Erythromycin   Fall Risk Score: 0 (? 5 = High Risk)  MD Orders: Evaluation and treatment  MEDICAL/REFERRING DIAGNOSIS:  Complex regional pain syndrome I, unspecified [G90.50]  Pain in left foot [M79.672]   DATE OF ONSET: sp sesmoidectomy 19 with 6 mos hx of on/off pain prior to surgery   REFERRING PHYSICIAN: Los Salazar MD  RETURN PHYSICIAN APPOINTMENT: TBD     INITIAL ASSESSMENT:  Ms. Josh Sicard presents with decreased strength R LE, decreased ROM, pain, and swelling L foot. Her symptoms are consistent with right s/p sesamoidectomy and complex regional pain syndrome. She has a high level of irritability and pain. She reports frequent episodes of foot swelling, pain, trophic changes with insidious onset. She will benefit from PT serivces with focus on pain control and de-sensitization so she can return to [facility]. PROBLEM LIST (Impacting functional limitations):  1. Decreased Strength  2. Decreased ADL/Functional Activities  3. Decreased Ambulation Ability/Technique  4. Decreased Balance  5. Increased Pain  6. Decreased Flexibility/Joint Mobility INTERVENTIONS PLANNED:  1. Balance Exercise  2. Cold  3. Cryotherapy  4. Electrical Stimulation  5. Gait Training  6. Heat  7. Home Exercise Program (HEP)  8. Manual Therapy  9. Range of Motion (ROM)  10. Therapeutic Activites  11. Therapeutic Exercise/Strengthening   12. Aquatics   TREATMENT PLAN:  Effective Dates: 18 TO 4/3/18. Frequency/Duration: 2-3 times a week for 12 weeks  GOALS: (Goals have been discussed and agreed upon with patient.)  Short-Term Functional Goals: Time Frame: 2 weeks   1.  Patient will be independent with HEP without pain.(MET)  2. Patient will have improved gait pattern WNL wearing gym shoes with cushion and rigid base, allowing her to ambulate 10 minutes without pain.(MET)   3. Patient will have improved SLB to 30 seconds without pain improving intrinsic foot strength. (MET)  Discharge Goals: Time Frame: 12 weeks  1. Patient will have improved LEFS to > 60/80 allowing her to return to community participation without pain.(progressing)  2. Patient will have improved mmt to 5-/5 B LE allowing her to progress to ascending/descending stairs without pain.(progressing)  3. 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 Rehabilitation Potential For Stated Goals: Good                HISTORY:   History of Present Injury/Illness (Reason for Referral):  24 y/o F with hx of left foot pain at the ball of her foot. She has been diagnosed with sesamoiditis and was making progress with PT services but had a sudden severe increase in pain and swelling and had a left sesamoidectomy on 11/29/17. She continues to have pain left foot 8/10 intensity daily. She has trophic changes and has been diagnosed with CRPS. She has easy flare ups without   Past Medical History/Comorbidities:   Ms. Reta Johnson  has no past medical history on file. Ms. Reta Johnson  has a past surgical history that includes hx orthopaedic (Left, age 17) and hx heent. Social History/Living Environment:     Independent, lives on campus with roommates   Prior Level of Function/Work/Activity:  Independent with running and Equestrian  Previous Treatment Approaches:          Prior PT, she never got her prescribed orthotics   Current Medications:    Current Outpatient Prescriptions:     Lisdexamfetamine (VYVANSE) 50 mg cap, Take 50 mg by mouth every morning.   \"for anxiety\", Disp: , Rfl:    Date Last Reviewed:  4/3/2018   EXAMINATION:   Observation/Orthostatic Postural Assessment:          Left foot purple coloration (especially 1st digit), scar is clean and healed, toe nail dry and cracking, cracking skin lateral foot   Palpation:          Sensitive with light touch left plantar surface   ROM:     AROM/PROM  DF R 20/25 L 10/15  PF R 45/60 L 15/20   IN R 30/40 L 15/20  EV R 20/25 L 5/10   First MTP flexion R 45/50 L 10/15 deg with pain  First MTP extension R 45/60 deg L 5/10 deg with pain    1/23/18  AROM and PROM WNL L DF/TN/IN/EV/first ray MTP ext and flexion   Strength:     mmt  Hip abduction 4/5 B  Hip extension 4/5 B  Ankle DF R 5/5 L 4-/5  Ankle PF R 5/5 L 4-/5  Ankle IN R 5/5 L 4-/5  Ankle EV R 5/5 L 4-/5    1/23/18 mmt  Ankle DF L 4+/5  Ankle PF L (mmt not WB testing)4+/5  Ankle IN L 4+/5  Ankle EV L 4+/5    3/13/18  Hip abduction 4+/5 B   Special Tests:          n/a  Neurological Screen:        Sensation: llight touch intact   Functional Mobility:         Gait/Ambulation:  Immobilizer boot left ankle with increased L LE ER with stance and decreased hip extension         3/13/18Functional lunge: R good L knee valgus and navicular drop   Balance:          SLB unable L LE with pain R good          3/13/18 SLB R good L minimal navicular drop with hip IR and knee valgus   Body Structures Involved:  1. Bones  2. Joints  3. Muscles Body Functions Affected:  1. Neuromusculoskeletal  2. Movement Related Activities and Participation Affected:  1. General Tasks and Demands  2. Mobility  3. Self Care  4. Community, Social and Civic Life   CLINICAL PRESENTATION:   CLINICAL DECISION MAKING:   Outcome Measure: Tool Used: Lower Extremity Functional Scale (LEFS)  Score:  Initial: 33/80 Most Recent: 48/80 (Date: -3/13/18 )   Interpretation of Score: 20 questions each scored on a 5 point scale with 0 representing \"extreme difficulty or unable to perform\" and 4 representing \"no difficulty\". The lower the score, the greater the functional disability. 80/80 represents no disability.   Minimal detectable change is 9 points. Score 80 79-63 62-48 47-32 31-16 15-1 0   Modifier CH CI CJ CK CL CM CN       · Patient is expected to demonstrate progress in strength, range of motion, balance, coordination and functional technique to increase independence with with gait. Reason for Services/Other Comments:  · Patient has been observed to have decreased strength and ROM before, during or after an intervention. TREATMENT:   (In addition to Assessment/Re-Assessment sessions the following treatments were rendered)  THERAPEUTIC EXERCISE: (see grid for minutes):  Exercises per grid below to improve mobility and strength. Required moderate visual, verbal, manual and tactile cues to promote proper body alignment, promote proper body posture and promote proper body mechanics. Progressed resistance, range and repetitions as indicated. MANUAL THERAPY: (see grid for minutes): Joint mobilization and Soft tissue mobilization was utilized and necessary because of the patient's restricted joint motion, painful spasm and loss of articular motion. MODALITIES: (see grid for minutes): *  Ultrasound was used today secondary to the patient having symptomatic soft tissue calcification. Ultrasound was used today to reduce pain, reduce spasm, reduce joint stiffness, increase muscle flexibility, increase tendon flexibility and increase ligament flexibility. *  Iontophoresis was used today secondary to the patient having thick adhesive scars and was used for the topical delivery of dexa into the patient's to reduce pain. *  Electrical Stimulation Therapy (IFC) was provided with intensity adjusted throughout treatment to patient tolerance. to reduce pain       *  Cold Pack Therapy in order to provide analgesia and reduce inflammation and edema. *  Hot Pack Therapy in order to relieve muscle spasm.      Date: 2/15/18  (visit 10)PN 2/22/18  (visit 11) 3/1/18  (visit 12) 3/13/18  (visit 13)PN 3/20/18  (visit 14) 3/22/18  (visit 15) 3/26/18  (visit 16) 3/29/18  (visit 17) 4-3-18  (Visit 18)    Modalities:    10 ins 10 min 10 min 10 min 10 min 10 mins    US with keto            Whirlpool cold then warm  5 min each   5 min cold and hot whilrpool  repeat repeat repeat 5 min each B LE in cold then hot whirlpool Repeat                Therapeutic Exercise: 35 min 25 min 30 mins 30 min         Towel crunches and inv/ev Lateral step ups 2x10 each with hip resistance 15#  15# 3x10 L LE 2x10 R LE Retro TM 10% inclune 1.5 mph x 5 min  TM 10% incline lateral walks x5 min total alt every 30 sec lateral         RDL 8# MB taps to table  Seated HR 50# B 3x15  BOSU lateral step ups with chop 4# 3x10 B        [place] board 4 dir 3x10 B  SL calms with green TB 30x Side steps with grey TB ankle around in squat 3 laps        Long sit Ankle pumps with mirror TB side steps and monster walks 3 laps each forward, backward, and lateral  3 laps each  BOSU static squat with lateral shifrs 3x30 sec holds         [place]   LP RIP 40# x 2' and DL HR 60# 3x15   Monster walks forward and backward 3 laps each dir grey TB around ankles        edu on HEP repeat  SLB with hip ER with black TB 3x10 B, Sl squat with SB tap to ground ro 3x10 B SL on BOSU black side with biceps curls 5# 3x10 B        treadmill x3' Next visit retro TM Split lunge 3x10 L LE only       Walking heel raises forward and reverse 2L following needling    Small circleboard            Proprioceptive Activities:                                                Manual Therapy: 10 min 15 min 10 mins 15 min 45 mins 30 miins 30 mins 40 min 35 mins    Fibular and tibia mobs , prone hip ER MET with manual pressure PA to hip        5 min    Joints stabs  repeat Scar massage plantar surface repeat 1st ray prox and distal infer mobs, melonie ev mobs, scar mobs  FDN/mFR B multifids L4-S1, LE hamstring and gastroc, HVLA L4/5 and SIJ mansip B  repeat repeat FDN B L/S L4-S1 B lateral hamstring proximal, medial hamstring medial, B posterior peroneals proximal, and distal gastroc/soleous Repeat    MFR intrinsics between MT and prox first ray PF mobs        L/S HVLA  X 1 B in sideyling with cavitations. Repeat    HEP: Provided HEP handout on 1/11/18  Treatment/Session Assessment: Pt reports the dry needling seems to be helping her symptoms and she is not getting the intense burning sensations as much anymore but she also knows her limits a little more now. Pt is using a custom insert in both shoes and is complaining of pain with heel raises and longer than 10 mins on the elliptical. Pt stated her pain is worse in the evenings. · Pain/ Symptoms: Initial:   0/10 \"I do my exercises on my own as much as I can tolerate. \"  Post Session:  0/10 No increased pain ·   Compliance with Program/Exercises: compliant  · Recommendations/Intent for next treatment session: \"Next visit will focus on advancements to more challenging activities\".    Total Treatment Duration:  PT Patient Time In/Time Out  Time In: 0800  Time Out: [address], PT, DPT

## 2018-04-10 ENCOUNTER — APPOINTMENT (OUTPATIENT)
Dept: PHYSICAL THERAPY | Age: 23
End: 2018-04-10
Payer: SELF-PAY

## 2018-04-11 ENCOUNTER — HOSPITAL ENCOUNTER (OUTPATIENT)
Dept: PHYSICAL THERAPY | Age: 23
Discharge: HOME | End: 2018-04-11
Payer: SELF-PAY

## 2018-04-11 PROCEDURE — 97140 MANUAL THERAPY 1/> REGIONS: CPT

## 2018-04-11 NOTE — PROGRESS NOTES
REHABILITATION [facility]  : 1995 [facility] [address]  [address] [facility] [place].  [phone]   [phone]        OUTPATIENT PHYSICAL THERAPY:Recertification [id number]        ICD-10: Treatment Diagnosis: Pain in left foot (M79.672)Effusion, left foot (M25.475)  Precautions/Allergies:   Erythromycin   Fall Risk Score: 0 (? 5 = High Risk)  MD Orders: Evaluation and treatment  MEDICAL/REFERRING DIAGNOSIS:  Complex regional pain syndrome I, unspecified [G90.50]  Pain in left foot [M79.672]   DATE OF ONSET: sp sesmoidectomy 17 with 6 mos hx of on/off pain prior to surgery   REFERRING PHYSICIAN: Zulay Fontaine MD  RETURN PHYSICIAN APPOINTMENT: TBD     INITIAL ASSESSMENT:  Ms. Caridad Marquez presents with decreased strength R LE, decreased ROM, pain, and swelling L foot. Her symptoms are consistent with right s/p sesamoidectomy and complex regional pain syndrome. She has a high level of irritability and pain. She reports frequent episodes of foot swelling, pain, trophic changes with insidious onset. She will benefit from PT serivces with focus on pain control and de-sensitization so she can return to [facility]. PROBLEM LIST (Impacting functional limitations):  1. Decreased Strength  2. Decreased ADL/Functional Activities  3. Decreased Ambulation Ability/Technique  4. Decreased Balance  5. Increased Pain  6. Decreased Flexibility/Joint Mobility INTERVENTIONS PLANNED:  1. Balance Exercise  2. Cold  3. Cryotherapy  4. Electrical Stimulation  5. Gait Training  6. Heat  7. Home Exercise Program (HEP)  8. Manual Therapy  9. Range of Motion (ROM)  10. Therapeutic Activites  11. Therapeutic Exercise/Strengthening   12. Aquatics   TREATMENT PLAN:  Effective Dates: 18 to 18.   Frequency/Duration: 1-2 times a week for 4 more weeks  GOALS: (Goals have been discussed and agreed upon with patient.)  Short-Term Functional Goals: Time Frame: 2 weeks 1. Patient will be independent with HEP without pain.(MET)  2. Patient will have improved gait pattern WNL wearing gym shoes with cushion and rigid base, allowing her to ambulate 10 minutes without pain.(MET)   3. Patient will have improved SLB to 30 seconds without pain improving intrinsic foot strength. (MET)  Discharge Goals: Time Frame: 12 weeks  1. Patient will have improved LEFS to > 60/80 allowing her to return to community participation without pain.(progressing)  2. Patient will have improved mmt to 5-/5 B LE allowing her to progress to ascending/descending stairs without pain.(progressing)  3. 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 Rehabilitation Potential For Stated Goals: Good    Patient stated the needling was helping control the pain and is helping with the functional activity tolerance. Pt to continue with needling until patient leaves for summer break. Regarding [facility] An[facility]'s therapy, I certify that the treatment plan above will be carried out by a therapist or under their direction. Thank you for this referral,  Blanquita Mejia, PT, DPT     Referring Physician Signature: Quita Marks MD          Date                        HISTORY:   History of Present Injury/Illness (Reason for Referral):  24 y/o F with hx of left foot pain at the ball of her foot. She has been diagnosed with sesamoiditis and was making progress with PT services but had a sudden severe increase in pain and swelling and had a left sesamoidectomy on 11/29/17. She continues to have pain left foot 8/10 intensity daily. She has trophic changes and has been diagnosed with CRPS. She has easy flare ups without   Past Medical History/Comorbidities:   Ms. Prashant Crow  has no past medical history on file. Ms. Prashant Crow  has a past surgical history that includes hx orthopaedic (Left, age 17) and hx heent.   Social History/Living Environment: Independent, lives on campus with roommates   Prior Level of Function/Work/Activity:  Independent with running and Equestrian  Previous Treatment Approaches:          Prior PT, she never got her prescribed orthotics   Current Medications:    Current Outpatient Prescriptions:     Lisdexamfetamine (VYVANSE) 50 mg cap, Take 50 mg by mouth every morning. \"for anxiety\", Disp: , Rfl:    Date Last Reviewed:  4/11/2018   EXAMINATION:   Observation/Orthostatic Postural Assessment:          Left foot purple coloration (especially 1st digit), scar is clean and healed, toe nail dry and cracking, cracking skin lateral foot   Palpation:          Sensitive with light touch left plantar surface   ROM:     AROM/PROM  DF R 20/25 L 10/15  PF R 45/60 L 15/20   IN R 30/40 L 15/20  EV R 20/25 L 5/10   First MTP flexion R 45/50 L 10/15 deg with pain  First MTP extension R 45/60 deg L 5/10 deg with pain    1/23/18  AROM and PROM WNL L DF/SD/IN/EV/first ray MTP ext and flexion   Strength:     mmt  Hip abduction 4/5 B  Hip extension 4/5 B  Ankle DF R 5/5 L 4-/5  Ankle PF R 5/5 L 4-/5  Ankle IN R 5/5 L 4-/5  Ankle EV R 5/5 L 4-/5    1/23/18 mmt  Ankle DF L 4+/5  Ankle PF L (mmt not WB testing)4+/5  Ankle IN L 4+/5  Ankle EV L 4+/5    3/13/18  Hip abduction 4+/5 B   Special Tests:          n/a  Neurological Screen:        Sensation: llight touch intact   Functional Mobility:         Gait/Ambulation:  Immobilizer boot left ankle with increased L LE ER with stance and decreased hip extension         3/13/18Functional lunge: R good L knee valgus and navicular drop   Balance:          SLB unable L LE with pain R good          3/13/18 SLB R good L minimal navicular drop with hip IR and knee valgus   Body Structures Involved:  1. Bones  2. Joints  3. Muscles Body Functions Affected:  1. Neuromusculoskeletal  2. Movement Related Activities and Participation Affected:  1. General Tasks and Demands  2. Mobility  3. Self Care  4.  Community, Social and Civic Life   CLINICAL PRESENTATION:   CLINICAL DECISION MAKING:   Outcome Measure: Tool Used: Lower Extremity Functional Scale (LEFS)  Score:  Initial: 33/80 Most Recent: 48/80 (Date: -3/13/18 )   Interpretation of Score: 20 questions each scored on a 5 point scale with 0 representing \"extreme difficulty or unable to perform\" and 4 representing \"no difficulty\". The lower the score, the greater the functional disability. 80/80 represents no disability. Minimal detectable change is 9 points. Score 80 79-63 62-48 47-32 31-16 15-1 0   Modifier CH CI CJ CK CL CM CN       · Patient is expected to demonstrate progress in strength, range of motion, balance, coordination and functional technique to increase independence with with gait. Reason for Services/Other Comments:  · Patient has been observed to have decreased strength and ROM before, during or after an intervention. TREATMENT:   (In addition to Assessment/Re-Assessment sessions the following treatments were rendered)  THERAPEUTIC EXERCISE: (see grid for minutes):  Exercises per grid below to improve mobility and strength. Required moderate visual, verbal, manual and tactile cues to promote proper body alignment, promote proper body posture and promote proper body mechanics. Progressed resistance, range and repetitions as indicated. MANUAL THERAPY: (see grid for minutes): Joint mobilization and Soft tissue mobilization was utilized and necessary because of the patient's restricted joint motion, painful spasm and loss of articular motion. MODALITIES: (see grid for minutes): *  Ultrasound was used today secondary to the patient having symptomatic soft tissue calcification. Ultrasound was used today to reduce pain, reduce spasm, reduce joint stiffness, increase muscle flexibility, increase tendon flexibility and increase ligament flexibility.        *  Iontophoresis was used today secondary to the patient having thick adhesive scars and was used for the topical delivery of dexa into the patient's to reduce pain. *  Electrical Stimulation Therapy (IFC) was provided with intensity adjusted throughout treatment to patient tolerance. to reduce pain       *  Cold Pack Therapy in order to provide analgesia and reduce inflammation and edema. *  Hot Pack Therapy in order to relieve muscle spasm.      Date: 2/15/18  (visit 10)PN 2/22/18  (visit 11) 3/1/18  (visit 12) 3/13/18  (visit 13)PN 3/20/18  (visit 14) 3/22/18  (visit 15) 3/26/18  (visit 16) 3/29/18  (visit 17) 4-3-18  (Visit 18)  4-11-18  (Visit 19)    Modalities:    10 ins 10 min 10 min 10 min 10 min 10 mins     US with keto             Whirlpool cold then warm  5 min each   5 min cold and hot whilrpool  repeat repeat repeat 5 min each B LE in cold then hot whirlpool Repeat                  Therapeutic Exercise: 35 min 25 min 30 mins 30 min          Towel crunches and inv/ev Lateral step ups 2x10 each with hip resistance 15#  15# 3x10 L LE 2x10 R LE Retro TM 10% inclune 1.5 mph x 5 min  TM 10% incline lateral walks x5 min total alt every 30 sec lateral          RDL 8# MB taps to table  Seated HR 50# B 3x15  BOSU lateral step ups with chop 4# 3x10 B         [place] board 4 dir 3x10 B  SL calms with green TB 30x Side steps with grey TB ankle around in squat 3 laps         Long sit Ankle pumps with mirror TB side steps and monster walks 3 laps each forward, backward, and lateral  3 laps each  BOSU static squat with lateral shifrs 3x30 sec holds          [place]   LP RIP 40# x 2' and DL HR 60# 3x15   Monster walks forward and backward 3 laps each dir grey TB around ankles         edu on HEP repeat  SLB with hip ER with black TB 3x10 B, Sl squat with SB tap to ground ro 3x10 B SL on BOSU black side with biceps curls 5# 3x10 B         treadmill x3' Next visit retro TM Split lunge 3x10 L LE only       Walking heel raises forward and reverse 2L following needling     Small circleboard Proprioceptive Activities:                                                    Manual Therapy: 10 min 15 min 10 mins 15 min 45 mins 30 miins 30 mins 40 min 35 mins  40 mins   Fibular and tibia mobs , prone hip ER MET with manual pressure PA to hip        5 min     Joints stabs  repeat Scar massage plantar surface repeat 1st ray prox and distal infer mobs, melonie ev mobs, scar mobs  FDN/mFR B multifids L4-S1, LE hamstring and gastroc, HVLA L4/5 and SIJ mansip B  repeat repeat FDN B L/S L4-S1 B lateral hamstring proximal, medial hamstring medial, B posterior peroneals proximal, and distal gastroc/soleous Repeat  Repeat    MFR intrinsics between MT and prox first ray PF mobs        L/S HVLA  X 1 B in sideyling with cavitations. Repeat  Repeat    HEP: Provided HEP handout on 1/11/18  Treatment/Session Assessment: pt stated she tried the treadmill running this past week but did not get past 10 mins because she was afraid of the pain increasing. Pt was told not to progress faster than this at this time due to the symptoms still returning. She is concerned because the numbness is in the R foot now and the discoloration is in her hands but no symptoms yet. Pt was given updated HEP with hip rotation RDLs, supine resisted hip extension with weights and hip ER with theraband. Continue with needling. · Pain/ Symptoms: Initial:   0/10 \"I feel like the needling is helping. \"  Post Session:  0/10 No increased pain ·   Compliance with Program/Exercises: compliant  · Recommendations/Intent for next treatment session: \"Next visit will focus on advancements to more challenging activities\".    Total Treatment Duration:  PT Patient Time In/Time Out  Time In: 0200  Time Out: [address], PT, DPT

## 2018-04-17 ENCOUNTER — HOSPITAL ENCOUNTER (OUTPATIENT)
Dept: PHYSICAL THERAPY | Age: 23
Discharge: HOME | End: 2018-04-17
Payer: SELF-PAY

## 2018-04-17 PROCEDURE — 97140 MANUAL THERAPY 1/> REGIONS: CPT

## 2018-04-17 NOTE — PROGRESS NOTES
REHABILITATION [facility]  : 1995 [facility] [address]  [facility], [address] [place] [address]  [phone]   [phone]        OUTPATIENT PHYSICAL THERAPY:Daily Note 2018        ICD-10: Treatment Diagnosis: Pain in left foot (M79.672)Effusion, left foot (M25.475)  Precautions/Allergies:   Erythromycin   Fall Risk Score: 0 (? 5 = High Risk)  MD Orders: Evaluation and treatment  MEDICAL/REFERRING DIAGNOSIS:  Complex regional pain syndrome I, unspecified [G90.50]  Pain in left foot [M79.672]   DATE OF ONSET: sp sesmoidectomy 17 with 6 mos hx of on/off pain prior to surgery   REFERRING PHYSICIAN: Gurmeet Vilchis MD  RETURN PHYSICIAN APPOINTMENT: TBD     INITIAL ASSESSMENT:  Ms. Reta Johnson presents with decreased strength R LE, decreased ROM, pain, and swelling L foot. Her symptoms are consistent with right s/p sesamoidectomy and complex regional pain syndrome. She has a high level of irritability and pain. She reports frequent episodes of foot swelling, pain, trophic changes with insidious onset. She will benefit from PT serivces with focus on pain control and de-sensitization so she can return to PLOF. PROBLEM LIST (Impacting functional limitations):  1. Decreased Strength  2. Decreased ADL/Functional Activities  3. Decreased Ambulation Ability/Technique  4. Decreased Balance  5. Increased Pain  6. Decreased Flexibility/Joint Mobility INTERVENTIONS PLANNED:  1. Balance Exercise  2. Cold  3. Cryotherapy  4. Electrical Stimulation  5. Gait Training  6. Heat  7. Home Exercise Program (HEP)  8. Manual Therapy  9. Range of Motion (ROM)  10. Therapeutic Activites  11. Therapeutic Exercise/Strengthening   12. Aquatics   TREATMENT PLAN:  Effective Dates: 18 to 18. Frequency/Duration: 1-2 times a week for 4 more weeks  GOALS: (Goals have been discussed and agreed upon with patient.)  Short-Term Functional Goals: Time Frame: 2 weeks   1.  Patient will be independent with HEP without pain.(MET)  2. Patient will have improved gait pattern WNL wearing gym shoes with cushion and rigid base, allowing her to ambulate 10 minutes without pain.(MET)   3. Patient will have improved SLB to 30 seconds without pain improving intrinsic foot strength. (MET)  Discharge Goals: Time Frame: 12 weeks  1. Patient will have improved LEFS to > 60/80 allowing her to return to community participation without pain.(progressing)  2. Patient will have improved mmt to 5-/5 B LE allowing her to progress to ascending/descending stairs without pain.(progressing)  3. 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 Rehabilitation Potential For Stated Goals: Good                HISTORY:   History of Present Injury/Illness (Reason for Referral):  24 y/o F with hx of left foot pain at the ball of her foot. She has been diagnosed with sesamoiditis and was making progress with PT services but had a sudden severe increase in pain and swelling and had a left sesamoidectomy on 11/29/17. She continues to have pain left foot 8/10 intensity daily. She has trophic changes and has been diagnosed with CRPS. She has easy flare ups without   Past Medical History/Comorbidities:   Ms. Ariana Warren  has no past medical history on file. Ms. Ariana Warren  has a past surgical history that includes hx orthopaedic (Left, age 17) and hx heent. Social History/Living Environment:     Independent, [place] on campus with roommates   Prior Level of Function/Work/Activity:  Independent with running and Equestrian  Previous Treatment Approaches:          Prior PT, she never got her prescribed orthotics   Current Medications:    Current Outpatient Prescriptions:     Lisdexamfetamine (VYVANSE) 50 mg cap, Take 50 mg by mouth every morning.   \"for anxiety\", Disp: , Rfl:    Date Last Reviewed:  4/17/2018   EXAMINATION:   Observation/Orthostatic Postural Assessment: Left foot purple coloration (especially 1st digit), scar is clean and healed, toe nail dry and cracking, cracking skin lateral foot   Palpation:          Sensitive with light touch left plantar surface   ROM:     AROM/PROM  DF R 20/25 L 10/15  PF R 45/60 L 15/20   IN R 30/40 L 15/20  EV R 20/25 L 5/10   First MTP flexion R 45/50 L 10/15 deg with pain  First MTP extension R 45/60 deg L 5/10 deg with pain    1/23/18  AROM and PROM WNL L DF/NV/IN/EV/first ray MTP ext and flexion   Strength:     mmt  Hip abduction 4/5 B  Hip extension 4/5 B  Ankle DF R 5/5 L 4-/5  Ankle PF R 5/5 L 4-/5  Ankle IN R 5/5 L 4-/5  Ankle EV R 5/5 L 4-/5    1/23/18 mmt  Ankle DF L 4+/5  Ankle PF L (mmt not WB testing)4+/5  Ankle IN L 4+/5  Ankle EV L 4+/5    3/13/18  Hip abduction 4+/5 B   Special Tests:          n/a  Neurological Screen:        Sensation: llight touch intact   Functional Mobility:         Gait/Ambulation:  Immobilizer boot left ankle with increased L LE ER with stance and decreased hip extension         3/13/18Functional lunge: R good L knee valgus and navicular drop   Balance:          SLB unable L LE with pain R good          3/13/18 SLB R good L minimal navicular drop with hip IR and knee valgus   Body Structures Involved:  1. Bones  2. Joints  3. Muscles Body Functions Affected:  1. Neuromusculoskeletal  2. Movement Related Activities and Participation Affected:  1. General Tasks and Demands  2. Mobility  3. Self Care  4. Community, Social and Civic Life   CLINICAL PRESENTATION:   CLINICAL DECISION MAKING:   Outcome Measure: Tool Used: Lower Extremity Functional Scale (LEFS)  Score:  Initial: 33/80 Most Recent: 48/80 (Date: -3/13/18 )   Interpretation of Score: 20 questions each scored on a 5 point scale with 0 representing \"extreme difficulty or unable to perform\" and 4 representing \"no difficulty\". The lower the score, the greater the functional disability. 80/80 represents no disability.   Minimal detectable change is 9 points. Score 80 79-63 62-48 47-32 31-16 15-1 0   Modifier CH CI CJ CK CL CM CN       · Patient is expected to demonstrate progress in strength, range of motion, balance, coordination and functional technique to increase independence with with gait. Reason for Services/Other Comments:  · Patient has been observed to have decreased strength and ROM before, during or after an intervention. TREATMENT:   (In addition to Assessment/Re-Assessment sessions the following treatments were rendered)  THERAPEUTIC EXERCISE: (see grid for minutes):  Exercises per grid below to improve mobility and strength. Required moderate visual, verbal, manual and tactile cues to promote proper body alignment, promote proper body posture and promote proper body mechanics. Progressed resistance, range and repetitions as indicated. MANUAL THERAPY: (see grid for minutes): Joint mobilization and Soft tissue mobilization was utilized and necessary because of the patient's restricted joint motion, painful spasm and loss of articular motion. MODALITIES: (see grid for minutes): *  Ultrasound was used today secondary to the patient having symptomatic soft tissue calcification. Ultrasound was used today to reduce pain, reduce spasm, reduce joint stiffness, increase muscle flexibility, increase tendon flexibility and increase ligament flexibility. *  Iontophoresis was used today secondary to the patient having thick adhesive scars and was used for the topical delivery of dexa into the patient's to reduce pain. *  Electrical Stimulation Therapy (IFC) was provided with intensity adjusted throughout treatment to patient tolerance. to reduce pain       *  Cold Pack Therapy in order to provide analgesia and reduce inflammation and edema. *  Hot Pack Therapy in order to relieve muscle spasm.      Date: 2/15/18  (visit 10)PN 2/22/18  (visit 11) 3/1/18  (visit 12) 3/13/18  (visit 13)PN 3/20/18  (visit 14) 3/22/18  (visit 15) 3/26/18  (visit 16) 3/29/18  (visit 17) 4-3-18  (Visit 18)  4-11-18  (Visit 19)   recert  5-11-68  (Visit 20)    Modalities:    10 ins 10 min 10 min 10 min 10 min 10 mins      US with keto              Whirlpool cold then warm  5 min each   5 min cold and hot whilrpool  repeat repeat repeat 5 min each B LE in cold then hot whirlpool Repeat                    Therapeutic Exercise: 35 min 25 min 30 mins 30 min           Towel crunches and inv/ev Lateral step ups 2x10 each with hip resistance 15#  15# 3x10 L LE 2x10 R LE Retro TM 10% inclune 1.5 mph x 5 min  TM 10% incline lateral walks x5 min total alt every 30 sec lateral           RDL 8# MB taps to table  Seated HR 50# B 3x15  BOSU lateral step ups with chop 4# 3x10 B          Weinert board 4 dir 3x10 B  SL calms with green TB 30x Side steps with grey TB ankle around in squat 3 laps          Long sit Ankle pumps with mirror TB side steps and monster walks 3 laps each forward, backward, and lateral  3 laps each  BOSU static squat with lateral shifrs 3x30 sec holds           [place]   LP RIP 40# x 2' and DL HR 60# 3x15   Monster walks forward and backward 3 laps each dir grey TB around ankles          edu on HEP repeat  SLB with hip ER with black TB 3x10 B, Sl squat with SB tap to ground ro 3x10 B SL on BOSU black side with biceps curls 5# 3x10 B          treadmill x3' Next visit retro TM Split lunge 3x10 L LE only       Walking heel raises forward and reverse 2L following needling      Small circleboard              Manual Therapy: 10 min 15 min 10 mins 15 min 45 mins 30 miins 30 mins 40 min 35 mins  40 mins 35 mins   Joints stabs  repeat Scar massage plantar surface repeat 1st ray prox and distal infer mobs, melonie ev mobs, scar mobs  FDN/mFR B multifids L4-S1, LE hamstring and gastroc, HVLA L4/5 and SIJ mansip B  repeat repeat FDN B L/S L4-S1 B lateral hamstring proximal, medial hamstring medial, B posterior peroneals proximal, and distal gastroc/soleous Repeat Repeat  Repeat    MFR intrinsics between MT and prox first ray PF mobs        L/S HVLA  X 1 B in sideyling with cavitations. Repeat  Repeat  Repeat successful x2    HEP: Provided HEP handout on 1/11/18  Treatment/Session Assessment: Today only the L leg was needled and into the back and calf. Pt was noted to have less tightness in the lower back on the L side following the needling on the L side. Pt was told to try to run daily at least 10 mins and to call and get another appt with Dr. Anselmo Noonan due ot the symptoms spreading since the last visit with him. Pt is still stating the needling is helping with the pain and she is showing progress and still able to ride but is not having success with the cold and the tingling in the hands and feet. Continue with POC. · Pain/ Symptoms: Initial:   0/10 \"I feel like the needling is helping. \"  Post Session:  0/10 No increased pain ·   Compliance with Program/Exercises: compliant  · Recommendations/Intent for next treatment session: \"Next visit will focus on advancements to more challenging activities\".    Total Treatment Duration:  PT Patient Time In/Time Out  Time In: 0805  Time Out: [address], PT, DPT

## 2018-04-23 ENCOUNTER — APPOINTMENT (OUTPATIENT)
Dept: PHYSICAL THERAPY | Age: 23
End: 2018-04-23
Payer: SELF-PAY

## 2018-04-25 ENCOUNTER — HOSPITAL ENCOUNTER (OUTPATIENT)
Dept: PHYSICAL THERAPY | Age: 23
Discharge: HOME | End: 2018-04-25
Payer: SELF-PAY

## 2018-04-25 PROCEDURE — 97140 MANUAL THERAPY 1/> REGIONS: CPT

## 2018-04-25 NOTE — PROGRESS NOTES
Saad Walker   (:1995) [id number] [facility]  [address] [place] [address].  [phone]   [phone]           PHYSICIAN COMMUNICATION    REFERRING PHYSICIAN: Syed Power MD  Return Physician Appointment: 18  MEDICAL/REFERRING DIAGNOSIS:  · Complex regional pain syndrome I, unspecified [G90.50]  · Pain in left foot [M79.672]  ATTENDANCE: Saad Walker has attended 21 out of 21 visits, with 0 cancellation(s) and 0 no shows. ASSESSMENT:  DATE: 2018    PROGRESS: Saad Walker states the needling helps control the radiating symptoms into the hamstrings and gastrocs on both legs but initially we were needling only the L leg due to the CRPS. Pt has since began noticing symptoms in the feet and the hands of discoloration and temperature change. Therapist not sure if the needling is actually helping or if placebo effect due to the therapist not finding trigger points in the legs, only in the piriformis and lower back. Pt also responds well to the manipulation of the lower spine and SI joints. RECOMMENDATIONS: Pt was recommended to make appt due to the complaint of worsening symptoms. Continue with needling if MD would like. Thank you for this referral,  Dorys Henry, PT, DPT     Referring Physician Signature:  Syed Power MD          Date

## 2018-04-25 NOTE — PROGRESS NOTES
REHABILITATION [facility]  : 1995 [facility] [address]  [facility], [address] [place] [address]  [phone]   [phone]        OUTPATIENT PHYSICAL THERAPY:Daily Note 2018        ICD-10: Treatment Diagnosis: Pain in left foot (M79.672)Effusion, left foot (M25.475)  Precautions/Allergies:   Erythromycin   Fall Risk Score: 0 (? 5 = High Risk)  MD Orders: Evaluation and treatment  MEDICAL/REFERRING DIAGNOSIS:  Complex regional pain syndrome I, unspecified [G90.50]  Pain in left foot [M79.672]   DATE OF ONSET: sp sesmoidectomy 17 with 6 mos hx of on/off pain prior to surgery   REFERRING PHYSICIAN: Jamin Alcantara MD  RETURN PHYSICIAN APPOINTMENT: TBD     INITIAL ASSESSMENT:  Ms. Chantel Ta presents with decreased strength R LE, decreased ROM, pain, and swelling L foot. Her symptoms are consistent with right s/p sesamoidectomy and complex regional pain syndrome. She has a high level of irritability and pain. She reports frequent episodes of foot swelling, pain, trophic changes with insidious onset. She will benefit from PT serivces with focus on pain control and de-sensitization so she can return to PLOF. PROBLEM LIST (Impacting functional limitations):  1. Decreased Strength  2. Decreased ADL/Functional Activities  3. Decreased Ambulation Ability/Technique  4. Decreased Balance  5. Increased Pain  6. Decreased Flexibility/Joint Mobility INTERVENTIONS PLANNED:  1. Balance Exercise  2. Cold  3. Cryotherapy  4. Electrical Stimulation  5. Gait Training  6. Heat  7. Home Exercise Program (HEP)  8. Manual Therapy  9. Range of Motion (ROM)  10. Therapeutic Activites  11. Therapeutic Exercise/Strengthening   12. Aquatics   TREATMENT PLAN:  Effective Dates: 18 to 18. Frequency/Duration: 1-2 times a week for 4 more weeks  GOALS: (Goals have been discussed and agreed upon with patient.)  Short-Term Functional Goals: Time Frame: 2 weeks   1.  Patient will be independent with HEP without pain.(MET)  2. Patient will have improved gait pattern WNL wearing gym shoes with cushion and rigid base, allowing her to ambulate 10 minutes without pain.(MET)   3. Patient will have improved SLB to 30 seconds without pain improving intrinsic foot strength. (MET)  Discharge Goals: Time Frame: 12 weeks  1. Patient will have improved LEFS to > 60/80 allowing her to return to community participation without pain.(progressing)  2. Patient will have improved mmt to 5-/5 B LE allowing her to progress to ascending/descending stairs without pain.(progressing)  3. 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 Rehabilitation Potential For Stated Goals: Good                HISTORY:   History of Present Injury/Illness (Reason for Referral):  22 y/o F with hx of left foot pain at the ball of her foot. She has been diagnosed with sesamoiditis and was making progress with PT services but had a sudden severe increase in pain and swelling and had a left sesamoidectomy on 11/29/17. She continues to have pain left foot 8/10 intensity daily. She has trophic changes and has been diagnosed with CRPS. She has easy flare ups without   Past Medical History/Comorbidities:   Ms. Nellie Mckeon  has no past medical history on file. Ms. Nellie Mckeon  has a past surgical history that includes hx orthopaedic (Left, age 17) and hx heent. Social History/Living Environment:     Independent, lives on campus with roommates   Prior Level of Function/Work/Activity:  Independent with running and Equestrian  Previous Treatment Approaches:          Prior PT, she never got her prescribed orthotics   Current Medications:    Current Outpatient Prescriptions:     Lisdexamfetamine (VYVANSE) 50 mg cap, Take 50 mg by mouth every morning.   \"for anxiety\", Disp: , Rfl:    Date Last Reviewed:  4/25/2018   EXAMINATION:   Observation/Orthostatic Postural Assessment: Left foot purple coloration (especially 1st digit), scar is clean and healed, toe nail dry and cracking, cracking skin lateral foot   Palpation:          Sensitive with light touch left plantar surface   ROM:     AROM/PROM  DF R 20/25 L 10/15  PF R 45/60 L 15/20   IN R 30/40 L 15/20  EV R 20/25 L 5/10   First MTP flexion R 45/50 L 10/15 deg with pain  First MTP extension R 45/60 deg L 5/10 deg with pain    1/23/18  AROM and PROM WNL L DF/ND/IN/EV/first ray MTP ext and flexion   Strength:     mmt  Hip abduction 4/5 B  Hip extension 4/5 B  Ankle DF R 5/5 L 4-/5  Ankle PF R 5/5 L 4-/5  Ankle IN R 5/5 L 4-/5  Ankle EV R 5/5 L 4-/5    1/23/18 mmt  Ankle DF L 4+/5  Ankle PF L (mmt not WB testing)4+/5  Ankle IN L 4+/5  Ankle EV L 4+/5    3/13/18  Hip abduction 4+/5 B   Special Tests:          n/a  Neurological Screen:        Sensation: llight touch intact   Functional Mobility:         Gait/Ambulation:  Immobilizer boot left ankle with increased L LE ER with stance and decreased hip extension         3/13/18Functional lunge: R good L knee valgus and navicular drop   Balance:          SLB unable L LE with pain R good          3/13/18 SLB R good L minimal navicular drop with hip IR and knee valgus   Body Structures Involved:  1. Bones  2. Joints  3. Muscles Body Functions Affected:  1. Neuromusculoskeletal  2. Movement Related Activities and Participation Affected:  1. General Tasks and Demands  2. Mobility  3. Self Care  4. Community, Social and Civic Life   CLINICAL PRESENTATION:   CLINICAL DECISION MAKING:   Outcome Measure: Tool Used: Lower Extremity Functional Scale (LEFS)  Score:  Initial: 33/80 Most Recent: 48/80 (Date: -3/13/18 )   Interpretation of Score: 20 questions each scored on a 5 point scale with 0 representing \"extreme difficulty or unable to perform\" and 4 representing \"no difficulty\". The lower the score, the greater the functional disability. 80/80 represents no disability.   Minimal detectable change is 9 points. Score 80 79-63 62-48 47-32 31-16 15-1 0   Modifier CH CI CJ CK CL CM CN       · Patient is expected to demonstrate progress in strength, range of motion, balance, coordination and functional technique to increase independence with with gait. Reason for Services/Other Comments:  · Patient has been observed to have decreased strength and ROM before, during or after an intervention. TREATMENT:   (In addition to Assessment/Re-Assessment sessions the following treatments were rendered)  THERAPEUTIC EXERCISE: (see grid for minutes):  Exercises per grid below to improve mobility and strength. Required moderate visual, verbal, manual and tactile cues to promote proper body alignment, promote proper body posture and promote proper body mechanics. Progressed resistance, range and repetitions as indicated. MANUAL THERAPY: (see grid for minutes): Joint mobilization and Soft tissue mobilization was utilized and necessary because of the patient's restricted joint motion, painful spasm and loss of articular motion. MODALITIES: (see grid for minutes): *  Ultrasound was used today secondary to the patient having symptomatic soft tissue calcification. Ultrasound was used today to reduce pain, reduce spasm, reduce joint stiffness, increase muscle flexibility, increase tendon flexibility and increase ligament flexibility. *  Iontophoresis was used today secondary to the patient having thick adhesive scars and was used for the topical delivery of dexa into the patient's to reduce pain. *  Electrical Stimulation Therapy (IFC) was provided with intensity adjusted throughout treatment to patient tolerance. to reduce pain       *  Cold Pack Therapy in order to provide analgesia and reduce inflammation and edema. *  Hot Pack Therapy in order to relieve muscle spasm.      Date: 3/22/18  (visit 15) 3/26/18  (visit 16) 3/29/18  (visit 17) 4-3-18  (Visit 18)  4-11-18  (Visit 19)   suha 4-17-18  (Visit 20)  4-25-18  (Visit 21)    Modalities: 10 min 10 min 10 min 10 mins       US with keto          Whirlpool cold then warm repeat repeat 5 min each B LE in cold then hot whirlpool Repeat                 Therapeutic Exercise: Towel crunches and inv/ev          RDL 8# MB taps to table          Hopi board 4 dir          Long sit Ankle pumps with mirror          [place]           edu on HEP          treadmill    Walking heel raises forward and reverse 2L following needling       Small circleboard          Manual Therapy: 30 miins 30 mins 40 min 35 mins  40 mins 35 mins 40 mins    Joints stabs  repeat repeat FDN B L/S L4-S1 B lateral hamstring proximal, medial hamstring medial, B posterior peroneals proximal, and distal gastroc/soleous Repeat  Repeat  Repeat  Repeat    MFR intrinsics between MT and prox first ray PF mobs   L/S HVLA  X 1 B in sideyling with cavitations. Repeat  Repeat  Repeat successful x2  successful with several cavitations lower back   HEP: Provided HEP handout on 1/11/18  Treatment/Session Assessment: Pt is going to the doctor tomorrow for a follow up concerning the discoloration in the feet and hands as well as the symptoms still going down the legs. Continue with treatment based on MD recommendations following tomorrow appt. · Pain/ Symptoms: Initial:   2/10 \"Its a little sore today.'  Post Session:  0/10 No increased pain ·   Compliance with Program/Exercises: compliant  · Recommendations/Intent for next treatment session: \"Next visit will focus on advancements to more challenging activities\".    Total Treatment Duration:  PT Patient Time In/Time Out  Time In: 0930  Time Out: [address], PT, DPT

## 2018-05-01 ENCOUNTER — HOSPITAL ENCOUNTER (OUTPATIENT)
Dept: PHYSICAL THERAPY | Age: 23
Discharge: HOME | End: 2018-05-01
Payer: SELF-PAY

## 2018-05-01 PROCEDURE — 97140 MANUAL THERAPY 1/> REGIONS: CPT

## 2018-05-01 NOTE — PROGRESS NOTES
[facility]  : 1995 [facility] [address]  [address] [place] [address] [place].  [phone]   [phone]        OUTPATIENT PHYSICAL THERAPY:Daily Note 2018        ICD-10: Treatment Diagnosis: Pain in left foot (M79.672)Effusion, left foot (M25.475)  Precautions/Allergies:   Erythromycin   Fall Risk Score: 0 (? 5 = High Risk)  MD Orders: Evaluation and treatment  MEDICAL/REFERRING DIAGNOSIS:  Complex regional pain syndrome I, unspecified [G90.50]  Pain in left foot [M79.672]   DATE OF ONSET: sp sesmoidectomy 19 with 6 mos hx of on/off pain prior to surgery   REFERRING PHYSICIAN: Jamaica Louie MD  RETURN PHYSICIAN APPOINTMENT: TBD     INITIAL ASSESSMENT:  Ms. Antwan Nava presents with decreased strength R LE, decreased ROM, pain, and swelling L foot. Her symptoms are consistent with right s/p sesamoidectomy and complex regional pain syndrome. She has a high level of irritability and pain. She reports frequent episodes of foot swelling, pain, trophic changes with insidious onset. She will benefit from PT serivces with focus on pain control and de-sensitization so she can return to [facility]. PROBLEM LIST (Impacting functional limitations):  1. Decreased Strength  2. Decreased ADL/Functional Activities  3. Decreased Ambulation Ability/Technique  4. Decreased Balance  5. Increased Pain  6. Decreased Flexibility/Joint Mobility INTERVENTIONS PLANNED:  1. Balance Exercise  2. Cold  3. Cryotherapy  4. Electrical Stimulation  5. Gait Training  6. Heat  7. Home Exercise Program (HEP)  8. Manual Therapy  9. Range of Motion (ROM)  10. Therapeutic Activites  11. Therapeutic Exercise/Strengthening   12. Aquatics   TREATMENT PLAN:  Effective Dates: 18 to 18. Frequency/Duration: 1-2 times a week for 4 more weeks  GOALS: (Goals have been discussed and agreed upon with patient.)  Short-Term Functional Goals: Time Frame: 2 weeks   1.  Patient will be independent with HEP without pain.(MET)  2. Patient will have improved gait pattern WNL wearing gym shoes with cushion and rigid base, allowing her to ambulate 10 minutes without pain.(MET)   3. Patient will have improved SLB to 30 seconds without pain improving intrinsic foot strength. (MET)  Discharge Goals: Time Frame: 12 weeks  1. Patient will have improved LEFS to > 60/80 allowing her to return to community participation without pain.(progressing)  2. Patient will have improved mmt to 5-/5 B LE allowing her to progress to ascending/descending stairs without pain.(progressing)  3. 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 Rehabilitation Potential For Stated Goals: Good                HISTORY:   History of Present Injury/Illness (Reason for Referral):  22 y/o F with hx of left foot pain at the ball of her foot. She has been diagnosed with sesamoiditis and was making progress with PT services but had a sudden severe increase in pain and swelling and had a left sesamoidectomy on 11/29/17. She continues to have pain left foot 8/10 intensity daily. She has trophic changes and has been diagnosed with CRPS. She has easy flare ups without   Past Medical History/Comorbidities:   Ms. Josh Sicard  has no past medical history on file. Ms. Josh Sicard  has a past surgical history that includes hx orthopaedic (Left, age 17) and hx heent. Social History/Living Environment:     Independent, lives on campus with roommates   Prior Level of Function/Work/Activity:  Independent with running and Equestrian  Previous Treatment Approaches:          Prior PT, she never got her prescribed orthotics   Current Medications:    Current Outpatient Prescriptions:     Lisdexamfetamine (VYVANSE) 50 mg cap, Take 50 mg by mouth every morning.   \"for anxiety\", Disp: , Rfl:    Date Last Reviewed:  5/1/2018   EXAMINATION:   Observation/Orthostatic Postural Assessment: Left foot purple coloration (especially 1st digit), scar is clean and healed, toe nail dry and cracking, cracking skin lateral foot   Palpation:          Sensitive with light touch left plantar surface   ROM:     AROM/PROM  DF R 20/25 L 10/15  PF R 45/60 L 15/20   IN R 30/40 L 15/20  EV R 20/25 L 5/10   First MTP flexion R 45/50 L 10/15 deg with pain  First MTP extension R 45/60 deg L 5/10 deg with pain    1/23/18  AROM and PROM WNL L DF/SC/IN/EV/first ray MTP ext and flexion   Strength:     mmt  Hip abduction 4/5 B  Hip extension 4/5 B  Ankle DF R 5/5 L 4-/5  Ankle PF R 5/5 L 4-/5  Ankle IN R 5/5 L 4-/5  Ankle EV R 5/5 L 4-/5    1/23/18 mmt  Ankle DF L 4+/5  Ankle PF L (mmt not WB testing)4+/5  Ankle IN L 4+/5  Ankle EV L 4+/5    3/13/18  Hip abduction 4+/5 B   Special Tests:          n/a  Neurological Screen:        Sensation: llight touch intact   Functional Mobility:         Gait/Ambulation:  Immobilizer boot left ankle with increased L LE ER with stance and decreased hip extension         3/13/18Functional lunge: R good L knee valgus and navicular drop   Balance:          SLB unable L LE with pain R good          3/13/18 SLB R good L minimal navicular drop with hip IR and knee valgus   Body Structures Involved:  1. Bones  2. Joints  3. Muscles Body Functions Affected:  1. Neuromusculoskeletal  2. Movement Related Activities and Participation Affected:  1. General Tasks and Demands  2. Mobility  3. Self Care  4. Community, Social and Civic Life   CLINICAL PRESENTATION:   CLINICAL DECISION MAKING:   Outcome Measure: Tool Used: Lower Extremity Functional Scale (LEFS)  Score:  Initial: 33/80 Most Recent: 48/80 (Date: -3/13/18 )   Interpretation of Score: 20 questions each scored on a 5 point scale with 0 representing \"extreme difficulty or unable to perform\" and 4 representing \"no difficulty\". The lower the score, the greater the functional disability. 80/80 represents no disability.   Minimal detectable change is 9 points. Score 80 79-63 62-48 47-32 31-16 15-1 0   Modifier CH CI CJ CK CL CM CN       · Patient is expected to demonstrate progress in strength, range of motion, balance, coordination and functional technique to increase independence with with gait. Reason for Services/Other Comments:  · Patient has been observed to have decreased strength and ROM before, during or after an intervention. TREATMENT:   (In addition to Assessment/Re-Assessment sessions the following treatments were rendered)  THERAPEUTIC EXERCISE: (see grid for minutes):  Exercises per grid below to improve mobility and strength. Required moderate visual, verbal, manual and tactile cues to promote proper body alignment, promote proper body posture and promote proper body mechanics. Progressed resistance, range and repetitions as indicated. MANUAL THERAPY: (see grid for minutes): Joint mobilization and Soft tissue mobilization was utilized and necessary because of the patient's restricted joint motion, painful spasm and loss of articular motion. MODALITIES: (see grid for minutes): *  Ultrasound was used today secondary to the patient having symptomatic soft tissue calcification. Ultrasound was used today to reduce pain, reduce spasm, reduce joint stiffness, increase muscle flexibility, increase tendon flexibility and increase ligament flexibility. *  Iontophoresis was used today secondary to the patient having thick adhesive scars and was used for the topical delivery of dexa into the patient's to reduce pain. *  Electrical Stimulation Therapy (IFC) was provided with intensity adjusted throughout treatment to patient tolerance. to reduce pain       *  Cold Pack Therapy in order to provide analgesia and reduce inflammation and edema. *  Hot Pack Therapy in order to relieve muscle spasm.      Date: 3/22/18  (visit 15) 3/26/18  (visit 16) 3/29/18  (visit 17) 4-3-18  (Visit 18)  4-11-18  (Visit 19)   suha 4-17-18  (Visit 20)  4-25-18  (Visit 21)  5-1-18  (Visit 22)    Modalities: 10 min 10 min 10 min 10 mins        US with keto           Whirlpool cold then warm repeat repeat 5 min each B LE in cold then hot whirlpool Repeat                   Therapeutic Exercise: Towel crunches and inv/ev           RDL 8# MB taps to table           [place] board 4 dir           Long sit Ankle pumps with mirror           [place]            edu on HEP           treadmill    Walking heel raises forward and reverse 2L following needling        Small circleboard           Manual Therapy: 30 miins 30 mins 40 min 35 mins  40 mins 35 mins 40 mins  40 mins    Joints stabs  repeat repeat FDN B L/S L4-S1 B lateral hamstring proximal, medial hamstring medial, B posterior peroneals proximal, and distal gastroc/soleous Repeat  Repeat  Repeat  Repeat  Performed    MFR intrinsics between MT and prox first ray PF mobs   L/S HVLA  X 1 B in sideyling with cavitations. Repeat  Repeat  Repeat successful x2  successful with several cavitations lower back Performed    HEP: Provided HEP handout on 1/11/18  Treatment/Session Assessment: Dr. Guadelupe Runner referred pt to a physiatrist for a visit tomorrow due to symptoms being inconsistent. Pt to continue with needling and manip. · Pain/ Symptoms: Initial:   1/10 \"Im doing ok today. \"  Post Session:  0/10 No increased pain ·   Compliance with Program/Exercises: compliant  · Recommendations/Intent for next treatment session: \"Next visit will focus on advancements to more challenging activities\".    Total Treatment Duration:  PT Patient Time In/Time Out  Time In: 0330  Time Out: [address] Mel[address] Lares, PT, DPT

## 2018-05-10 ENCOUNTER — HOSPITAL ENCOUNTER (OUTPATIENT)
Dept: PHYSICAL THERAPY | Age: 23
Discharge: HOME | End: 2018-05-10
Payer: SELF-PAY

## 2018-05-10 PROCEDURE — 97140 MANUAL THERAPY 1/> REGIONS: CPT

## 2018-05-10 NOTE — PROGRESS NOTES
[facility]  : 1995 [facility] [address]  [address] [facility] [place].  [phone]   [phone]        OUTPATIENT PHYSICAL THERAPY:Daily Note 5/10/2018        ICD-10: Treatment Diagnosis: Pain in left foot (M79.672)Effusion, left foot (M25.475)  Precautions/Allergies:   Erythromycin   Fall Risk Score: 0 (? 5 = High Risk)  MD Orders: Evaluation and treatment  MEDICAL/REFERRING DIAGNOSIS:  Complex regional pain syndrome I, unspecified [G90.50]  Pain in left foot [M79.672]   DATE OF ONSET: sp sesmoidectomy 17 with 6 mos hx of on/off pain prior to surgery   REFERRING PHYSICIAN: Randee Alonso MD  RETURN PHYSICIAN APPOINTMENT: TBD     INITIAL ASSESSMENT:  Ms. Yadi Cesar presents with decreased strength R LE, decreased ROM, pain, and swelling L foot. Her symptoms are consistent with right s/p sesamoidectomy and complex regional pain syndrome. She has a high level of irritability and pain. She reports frequent episodes of foot swelling, pain, trophic changes with insidious onset. She will benefit from PT serivces with focus on pain control and de-sensitization so she can return to [facility]. PROBLEM LIST (Impacting functional limitations):  1. Decreased Strength  2. Decreased ADL/Functional Activities  3. Decreased Ambulation Ability/Technique  4. Decreased Balance  5. Increased Pain  6. Decreased Flexibility/Joint Mobility INTERVENTIONS PLANNED:  1. Balance Exercise  2. Cold  3. Cryotherapy  4. Electrical Stimulation  5. Gait Training  6. Heat  7. Home Exercise Program (HEP)  8. Manual Therapy  9. Range of Motion (ROM)  10. Therapeutic Activites  11. Therapeutic Exercise/Strengthening   12. Aquatics   TREATMENT PLAN:  Effective Dates: 18 to 18. Frequency/Duration: 1-2 times a week for 4 more weeks  GOALS: (Goals have been discussed and agreed upon with patient.)  Short-Term Functional Goals: Time Frame: 2 weeks   1.  Patient will be independent with HEP without pain.(MET)  2. Patient will have improved gait pattern WNL wearing gym shoes with cushion and rigid base, allowing her to ambulate 10 minutes without pain.(MET)   3. Patient will have improved SLB to 30 seconds without pain improving intrinsic foot strength. (MET)  Discharge Goals: Time Frame: 12 weeks  1. Patient will have improved LEFS to > 60/80 allowing her to return to community participation without pain.(progressing)  2. Patient will have improved mmt to 5-/5 B LE allowing her to progress to ascending/descending stairs without pain.(progressing)  3. 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 Rehabilitation Potential For Stated Goals: Good                HISTORY:   History of Present Injury/Illness (Reason for Referral):  24 y/o F with hx of left foot pain at the ball of her foot. She has been diagnosed with sesamoiditis and was making progress with PT services but had a sudden severe increase in pain and swelling and had a left sesamoidectomy on 11/29/17. She continues to have pain left foot 8/10 intensity daily. She has trophic changes and has been diagnosed with CRPS. She has easy flare ups without   Past Medical History/Comorbidities:   Ms. John Mosley  has no past medical history on file. Ms. John Mosley  has a past surgical history that includes hx orthopaedic (Left, age 17) and hx heent. Social History/Living Environment:     Independent, lives on campus with roommates   Prior Level of Function/Work/Activity:  Independent with running and Equestrian  Previous Treatment Approaches:          Prior PT, she never got her prescribed orthotics   Current Medications:    Current Outpatient Prescriptions:     Lisdexamfetamine (VYVANSE) 50 mg cap, Take 50 mg by mouth every morning.   \"for anxiety\", Disp: , Rfl:    Date Last Reviewed:  5/10/2018   EXAMINATION:   Observation/Orthostatic Postural Assessment: Left foot purple coloration (especially 1st digit), scar is clean and healed, toe nail dry and cracking, cracking skin lateral foot   Palpation:          Sensitive with light touch left plantar surface   ROM:     AROM/PROM  DF R 20/25 L 10/15  PF R 45/60 L 15/20   IN R 30/40 L 15/20  EV R 20/25 L 5/10   First MTP flexion R 45/50 L 10/15 deg with pain  First MTP extension R 45/60 deg L 5/10 deg with pain    1/23/18  AROM and PROM WNL L DF/IL/IN/EV/first ray MTP ext and flexion   Strength:     mmt  Hip abduction 4/5 B  Hip extension 4/5 B  Ankle DF R 5/5 L 4-/5  Ankle PF R 5/5 L 4-/5  Ankle IN R 5/5 L 4-/5  Ankle EV R 5/5 L 4-/5    1/23/18 mmt  Ankle DF L 4+/5  Ankle PF L (mmt not WB testing)4+/5  Ankle IN L 4+/5  Ankle EV L 4+/5    3/13/18  Hip abduction 4+/5 B   Special Tests:          n/a  Neurological Screen:        Sensation: llight touch intact   Functional Mobility:         Gait/Ambulation:  Immobilizer boot left ankle with increased L LE ER with stance and decreased hip extension         3/13/18Functional lunge: R good L knee valgus and navicular drop   Balance:          SLB unable L LE with pain R good          3/13/18 SLB R good L minimal navicular drop with hip IR and knee valgus   Body Structures Involved:  1. Bones  2. Joints  3. Muscles Body Functions Affected:  1. Neuromusculoskeletal  2. Movement Related Activities and Participation Affected:  1. General Tasks and Demands  2. Mobility  3. Self Care  4. Community, Social and Civic Life   CLINICAL PRESENTATION:   CLINICAL DECISION MAKING:   Outcome Measure: Tool Used: Lower Extremity Functional Scale (LEFS)  Score:  Initial: 33/80 Most Recent: 48/80 (Date: -3/13/18 )   Interpretation of Score: 20 questions each scored on a 5 point scale with 0 representing \"extreme difficulty or unable to perform\" and 4 representing \"no difficulty\". The lower the score, the greater the functional disability. 80/80 represents no disability.   Minimal detectable change is 9 points. Score 80 79-63 62-48 47-32 31-16 15-1 0   Modifier CH CI CJ CK CL CM CN       · Patient is expected to demonstrate progress in strength, range of motion, balance, coordination and functional technique to increase independence with with gait. Reason for Services/Other Comments:  · Patient has been observed to have decreased strength and ROM before, during or after an intervention. TREATMENT:   (In addition to Assessment/Re-Assessment sessions the following treatments were rendered)  THERAPEUTIC EXERCISE: (see grid for minutes):  Exercises per grid below to improve mobility and strength. Required moderate visual, verbal, manual and tactile cues to promote proper body alignment, promote proper body posture and promote proper body mechanics. Progressed resistance, range and repetitions as indicated. MANUAL THERAPY: (see grid for minutes): Joint mobilization and Soft tissue mobilization was utilized and necessary because of the patient's restricted joint motion, painful spasm and loss of articular motion. MODALITIES: (see grid for minutes): *  Ultrasound was used today secondary to the patient having symptomatic soft tissue calcification. Ultrasound was used today to reduce pain, reduce spasm, reduce joint stiffness, increase muscle flexibility, increase tendon flexibility and increase ligament flexibility. *  Iontophoresis was used today secondary to the patient having thick adhesive scars and was used for the topical delivery of dexa into the patient's to reduce pain. *  Electrical Stimulation Therapy (IFC) was provided with intensity adjusted throughout treatment to patient tolerance. to reduce pain       *  Cold Pack Therapy in order to provide analgesia and reduce inflammation and edema. *  Hot Pack Therapy in order to relieve muscle spasm.      Date: 3/22/18  (visit 15) 3/26/18  (visit 16) 3/29/18  (visit 17) 4-3-18  (Visit 18)  4-11-18  (Visit 19)   suha 4-17-18  (Visit 20)  4-25-18  (Visit 21)  5-1-18  (Visit 22)  5-10-18  (Visit 23)    Modalities: 10 min 10 min 10 min 10 mins         US with keto            Whirlpool cold then warm repeat repeat 5 min each B LE in cold then hot whirlpool Repeat                     Therapeutic Exercise: Towel crunches and inv/ev            RDL 8# MB taps to table            [place] board 4 dir            Long sit Ankle pumps with mirror            [place]             edu on HEP            treadmill    Walking heel raises forward and reverse 2L following needling         Small circleboard            Manual Therapy: 30 miins 30 mins 40 min 35 mins  40 mins 35 mins 40 mins  40 mins  40 mins    Joints stabs  repeat repeat FDN B L/S L4-S1 B lateral hamstring proximal, medial hamstring medial, B posterior peroneals proximal, and distal gastroc/soleous Repeat  Repeat  Repeat  Repeat  Performed  Repeat with STM following needling into the lower legs    MFR intrinsics between MT and prox first ray PF mobs   L/S HVLA  X 1 B in sideyling with cavitations. Repeat  Repeat  Repeat successful x2  successful with several cavitations lower back Performed  Successful bilaterally   HEP: Provided HEP handout on 1/11/18  Treatment/Session Assessment: Pt went to the doctor and was told she needed blood work and the nerve conduction study. Pt to continue with needling and manips    · Pain/ Symptoms: Initial:   1/10 \"I went to the doctor but he wasn't that helpful. I had blood work done and he wanted to do all these other tests but didn't tell me what he was looking for. \"  Post Session:  0/10 No increased pain ·   Compliance with Program/Exercises: compliant  · Recommendations/Intent for next treatment session: \"Next visit will focus on advancements to more challenging activities\".    Total Treatment Duration:  PT Patient Time In/Time Out  Time In: 0805  Time Out: [address], PT, DPT

## 2018-05-17 ENCOUNTER — HOSPITAL ENCOUNTER (OUTPATIENT)
Dept: PHYSICAL THERAPY | Age: 23
Discharge: HOME | End: 2018-05-17
Payer: SELF-PAY

## 2018-05-17 PROCEDURE — 97140 MANUAL THERAPY 1/> REGIONS: CPT

## 2018-05-17 NOTE — PROGRESS NOTES
REHABILITATION [facility]  : 1995 [facility]  [address], [place]  [phone]   [phone]        OUTPATIENT PHYSICAL THERAPY:Daily Note 2018        ICD-10: Treatment Diagnosis: Pain in left foot (M79.672)Effusion, left foot (M25.475)  Precautions/Allergies:   Erythromycin   Fall Risk Score: 0 (? 5 = High Risk)  MD Orders: Evaluation and treatment  MEDICAL/REFERRING DIAGNOSIS:  Complex regional pain syndrome I, unspecified [G90.50]  Pain in left foot [M79.672]   DATE OF ONSET: sp sesmoidectomy 19 with 6 mos hx of on/off pain prior to surgery   REFERRING PHYSICIAN: Avery Carlisle MD  RETURN PHYSICIAN APPOINTMENT: TBD     INITIAL ASSESSMENT:  Ms. Deacon Shipman presents with decreased strength R LE, decreased ROM, pain, and swelling L foot. Her symptoms are consistent with right s/p sesamoidectomy and complex regional pain syndrome. She has a high level of irritability and pain. She reports frequent episodes of foot swelling, pain, trophic changes with insidious onset. She will benefit from PT serivces with focus on pain control and de-sensitization so she can return to PLOF. PROBLEM LIST (Impacting functional limitations):  1. Decreased Strength  2. Decreased ADL/Functional Activities  3. Decreased Ambulation Ability/Technique  4. Decreased Balance  5. Increased Pain  6. Decreased Flexibility/Joint Mobility INTERVENTIONS PLANNED:  1. Balance Exercise  2. Cold  3. Cryotherapy  4. Electrical Stimulation  5. Gait Training  6. Heat  7. Home Exercise Program (HEP)  8. Manual Therapy  9. Range of Motion (ROM)  10. Therapeutic Activites  11. Therapeutic Exercise/Strengthening   12. Aquatics   TREATMENT PLAN:  Effective Dates: 18 to 18. Frequency/Duration: 1-2 times a week for 4 more weeks  GOALS: (Goals have been discussed and agreed upon with patient.)  Short-Term Functional Goals: Time Frame: 2 weeks   1.  Patient will be independent with HEP without pain.(MET)  2. Patient will have improved gait pattern WNL wearing gym shoes with cushion and rigid base, allowing her to ambulate 10 minutes without pain.(MET)   3. Patient will have improved SLB to 30 seconds without pain improving intrinsic foot strength. (MET)  Discharge Goals: Time Frame: 12 weeks  1. Patient will have improved LEFS to > 60/80 allowing her to return to community participation without pain.(progressing)  2. Patient will have improved mmt to 5-/5 B LE allowing her to progress to ascending/descending stairs without pain.(progressing)  3. 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 Rehabilitation Potential For Stated Goals: Good                HISTORY:   History of Present Injury/Illness (Reason for Referral):  24 y/o F with hx of left foot pain at the ball of her foot. She has been diagnosed with sesamoiditis and was making progress with PT services but had a sudden severe increase in pain and swelling and had a left sesamoidectomy on 11/29/17. She continues to have pain left foot 8/10 intensity daily. She has trophic changes and has been diagnosed with CRPS. She has easy flare ups without   Past Medical History/Comorbidities:   Ms. Tal Walters  has no past medical history on file. Ms. Tal Walters  has a past surgical history that includes hx orthopaedic (Left, age 17) and hx heent. Social History/Living Environment:     Independent, lives on campus with roommates   Prior Level of Function/Work/Activity:  Independent with running and Equestrian  Previous Treatment Approaches:          Prior PT, she never got her prescribed orthotics   Current Medications:    Current Outpatient Prescriptions:     Lisdexamfetamine (VYVANSE) 50 mg cap, Take 50 mg by mouth every morning.   \"for anxiety\", Disp: , Rfl:    Date Last Reviewed:  5/17/2018   EXAMINATION:   Observation/Orthostatic Postural Assessment: Left foot purple coloration (especially 1st digit), scar is clean and healed, toe nail dry and cracking, cracking skin lateral foot   Palpation:          Sensitive with light touch left plantar surface   ROM:     AROM/PROM  DF R 20/25 L 10/15  PF R 45/60 L 15/20   IN R 30/40 L 15/20  EV R 20/25 L 5/10   First MTP flexion R 45/50 L 10/15 deg with pain  First MTP extension R 45/60 deg L 5/10 deg with pain    1/23/18  AROM and PROM WNL L DF/MI/IN/EV/first ray MTP ext and flexion   Strength:     mmt  Hip abduction 4/5 B  Hip extension 4/5 B  Ankle DF R 5/5 L 4-/5  Ankle PF R 5/5 L 4-/5  Ankle IN R 5/5 L 4-/5  Ankle EV R 5/5 L 4-/5    1/23/18 mmt  Ankle DF L 4+/5  Ankle PF L (mmt not WB testing)4+/5  Ankle IN L 4+/5  Ankle EV L 4+/5    3/13/18  Hip abduction 4+/5 B   Special Tests:          n/a  Neurological Screen:        Sensation: llight touch intact   Functional Mobility:         Gait/Ambulation:  Immobilizer boot left ankle with increased L LE ER with stance and decreased hip extension         3/13/18Functional lunge: R good L knee valgus and navicular drop   Balance:          SLB unable L LE with pain R good          3/13/18 SLB R good L minimal navicular drop with hip IR and knee valgus   Body Structures Involved:  1. Bones  2. Joints  3. Muscles Body Functions Affected:  1. Neuromusculoskeletal  2. Movement Related Activities and Participation Affected:  1. General Tasks and Demands  2. Mobility  3. Self Care  4. Community, Social and Civic Life   CLINICAL PRESENTATION:   CLINICAL DECISION MAKING:   Outcome Measure: Tool Used: Lower Extremity Functional Scale (LEFS)  Score:  Initial: 33/80 Most Recent: 48/80 (Date: -3/13/18 )   Interpretation of Score: 20 questions each scored on a 5 point scale with 0 representing \"extreme difficulty or unable to perform\" and 4 representing \"no difficulty\". The lower the score, the greater the functional disability. 80/80 represents no disability.   Minimal detectable change is 9 points. Score 80 79-63 62-48 47-32 31-16 15-1 0   Modifier CH CI CJ CK CL CM CN       · Patient is expected to demonstrate progress in strength, range of motion, balance, coordination and functional technique to increase independence with with gait. Reason for Services/Other Comments:  · Patient has been observed to have decreased strength and ROM before, during or after an intervention. TREATMENT:   (In addition to Assessment/Re-Assessment sessions the following treatments were rendered)  THERAPEUTIC EXERCISE: (see grid for minutes):  Exercises per grid below to improve mobility and strength. Required moderate visual, verbal, manual and tactile cues to promote proper body alignment, promote proper body posture and promote proper body mechanics. Progressed resistance, range and repetitions as indicated. MANUAL THERAPY: (see grid for minutes): Joint mobilization and Soft tissue mobilization was utilized and necessary because of the patient's restricted joint motion, painful spasm and loss of articular motion. MODALITIES: (see grid for minutes): *  Ultrasound was used today secondary to the patient having symptomatic soft tissue calcification. Ultrasound was used today to reduce pain, reduce spasm, reduce joint stiffness, increase muscle flexibility, increase tendon flexibility and increase ligament flexibility. *  Iontophoresis was used today secondary to the patient having thick adhesive scars and was used for the topical delivery of dexa into the patient's to reduce pain. *  Electrical Stimulation Therapy (IFC) was provided with intensity adjusted throughout treatment to patient tolerance. to reduce pain       *  Cold Pack Therapy in order to provide analgesia and reduce inflammation and edema. *  Hot Pack Therapy in order to relieve muscle spasm.      Date: 3/22/18  (visit 15) 3/26/18  (visit 16) 3/29/18  (visit 17) 4-3-18  (Visit 18)  4-11-18  (Visit 19)   suha 4-17-18  (Visit 20)  4-25-18  (Visit 21)  5-1-18  (Visit 22)  5-10-18  (Visit 23)  5-17-18  (Visit 24)    Modalities: 10 min 10 min 10 min 10 mins          US with keto             Whirlpool cold then warm repeat repeat 5 min each B LE in cold then hot whirlpool Repeat                       Therapeutic Exercise: Towel crunches and inv/ev             RDL 8# MB taps to table             [place] board 4 dir             Long sit Ankle pumps with mirror             [place]              edu on HEP             treadmill    Walking heel raises forward and reverse 2L following needling          Small circleboard             Manual Therapy: 30 miins 30 mins 40 min 35 mins  40 mins 35 mins 40 mins  40 mins  40 mins  45 mins    Joints stabs  repeat repeat FDN B L/S L4-S1 B lateral hamstring proximal, medial hamstring medial, B posterior peroneals proximal, and distal gastroc/soleous Repeat  Repeat  Repeat  Repeat  Performed  Repeat with STM following needling into the lower legs  Repeat    MFR intrinsics between MT and prox first ray PF mobs   L/S HVLA  X 1 B in sideyling with cavitations. Repeat  Repeat  Repeat successful x2  successful with several cavitations lower back Performed  Successful bilaterally Repeat in lower back but not SIJ    HEP: Provided HEP handout on 1/11/18  Treatment/Session Assessment: Pt is making good progress with needling and the pain seems to be keeping at [address] but pt stated the doctor told her she had a mixed connective tissue disorder and would need further testing to figure out the next step. Pt is waiting for an MD consult with rheumatologist.     · Pain/ Symptoms: Initial:   1/10 \"I went to the doctor but he wasn't that helpful. I had blood work done and he wanted to do all these other tests but didn't tell me what he was looking for. \"  Post Session:  0/10 No increased pain ·   Compliance with Program/Exercises: compliant  · Recommendations/Intent for next treatment session: \"Next visit will focus on advancements to more challenging activities\".    Total Treatment Duration:  PT Patient Time In/Time Out  Time In: 0245  Time Out: [address], PT, DPT

## 2018-05-24 ENCOUNTER — HOSPITAL ENCOUNTER (OUTPATIENT)
Dept: PHYSICAL THERAPY | Age: 23
Discharge: HOME | End: 2018-05-24
Payer: SELF-PAY

## 2018-05-24 PROCEDURE — 97140 MANUAL THERAPY 1/> REGIONS: CPT

## 2018-05-24 NOTE — PROGRESS NOTES
REHABILITATION [facility]  : 1995 [facility] [address]  [address] [place] [address], [address] [place] [address]  [phone]   [phone]        OUTPATIENT PHYSICAL THERAPY:Daily Note 2018        ICD-10: Treatment Diagnosis: Pain in left foot (M79.672)Effusion, left foot (M25.475)  Precautions/Allergies:   Erythromycin   Fall Risk Score: 0 (? 5 = High Risk)  MD Orders: Evaluation and treatment  MEDICAL/REFERRING DIAGNOSIS:  Complex regional pain syndrome I, unspecified [G90.50]  Pain in left foot [M79.672]   DATE OF ONSET: sp sesmoidectomy 17 with 6 mos hx of on/off pain prior to surgery   REFERRING PHYSICIAN: Gabriel Gloria MD  RETURN PHYSICIAN APPOINTMENT: TBD     INITIAL ASSESSMENT:  Ms. Jose Johnson presents with decreased strength R LE, decreased ROM, pain, and swelling L foot. Her symptoms are consistent with right s/p sesamoidectomy and complex regional pain syndrome. She has a high level of irritability and pain. She reports frequent episodes of foot swelling, pain, trophic changes with insidious onset. She will benefit from PT serivces with focus on pain control and de-sensitization so she can return to OF. PROBLEM LIST (Impacting functional limitations):  1. Decreased Strength  2. Decreased ADL/Functional Activities  3. Decreased Ambulation Ability/Technique  4. Decreased Balance  5. Increased Pain  6. Decreased Flexibility/Joint Mobility INTERVENTIONS PLANNED:  1. Balance Exercise  2. Cold  3. Cryotherapy  4. Electrical Stimulation  5. Gait Training  6. Heat  7. Home Exercise Program (HEP)  8. Manual Therapy  9. Range of Motion (ROM)  10. Therapeutic Activites  11. Therapeutic Exercise/Strengthening   12. Aquatics   TREATMENT PLAN:  Effective Dates: 18 to 18. Frequency/Duration: 1-2 times a week for 4 more weeks  GOALS: (Goals have been discussed and agreed upon with patient.)  Short-Term Functional Goals: Time Frame: 2 weeks   1.  Patient will be independent with HEP without pain.(MET)  2. Patient will have improved gait pattern WNL wearing gym shoes with cushion and rigid base, allowing her to ambulate 10 minutes without pain.(MET)   3. Patient will have improved SLB to 30 seconds without pain improving intrinsic foot strength. (MET)  Discharge Goals: Time Frame: 12 weeks  1. Patient will have improved LEFS to > 60/80 allowing her to return to community participation without pain.(progressing)  2. Patient will have improved mmt to 5-/5 B LE allowing her to progress to ascending/descending stairs without pain.(progressing)  3. 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 Rehabilitation Potential For Stated Goals: Good                HISTORY:   History of Present Injury/Illness (Reason for Referral):  24 y/o F with hx of left foot pain at the ball of her foot. She has been diagnosed with sesamoiditis and was making progress with PT services but had a sudden severe increase in pain and swelling and had a left sesamoidectomy on 11/29/17. She continues to have pain left foot 8/10 intensity daily. She has trophic changes and has been diagnosed with CRPS. She has easy flare ups without   Past Medical History/Comorbidities:   Ms. Zeb Gerard  has no past medical history on file. Ms. Zeb Gerard  has a past surgical history that includes hx orthopaedic (Left, age 17) and hx heent. Social History/Living Environment:     Independent, lives on campus with roommates   Prior Level of Function/Work/Activity:  Independent with running and Equestrian  Previous Treatment Approaches:          Prior PT, she never got her prescribed orthotics   Current Medications:    Current Outpatient Prescriptions:     Lisdexamfetamine (VYVANSE) 50 mg cap, Take 50 mg by mouth every morning.   \"for anxiety\", Disp: , Rfl:    Date Last Reviewed:  5/24/2018   EXAMINATION:   Observation/Orthostatic Postural Assessment: Left foot purple coloration (especially 1st digit), scar is clean and healed, toe nail dry and cracking, cracking skin lateral foot   Palpation:          Sensitive with light touch left plantar surface   ROM:     AROM/PROM  DF R 20/25 L 10/15  PF R 45/60 L 15/20   IN R 30/40 L 15/20  EV R 20/25 L 5/10   First MTP flexion R 45/50 L 10/15 deg with pain  First MTP extension R 45/60 deg L 5/10 deg with pain    1/23/18  AROM and PROM WNL L DF/[place]/IN/EV/first ray MTP ext and flexion   Strength:     mmt  Hip abduction 4/5 B  Hip extension 4/5 B  Ankle DF R 5/5 L 4-/5  Ankle PF R 5/5 L 4-/5  Ankle IN R 5/5 L 4-/5  Ankle EV R 5/5 L 4-/5    1/23/18 mmt  Ankle DF L 4+/5  Ankle PF L (mmt not WB testing)4+/5  Ankle IN L 4+/5  Ankle EV L 4+/5    3/13/18  Hip abduction 4+/5 B   Special Tests:          n/a  Neurological Screen:        Sensation: llight touch intact   Functional Mobility:         Gait/Ambulation:  Immobilizer boot left ankle with increased L LE ER with stance and decreased hip extension         3/13/18Functional lunge: R good L knee valgus and navicular drop   Balance:          SLB unable L LE with pain R good          3/13/18 SLB R good L minimal navicular drop with hip IR and knee valgus   Body Structures Involved:  1. Bones  2. Joints  3. Muscles Body Functions Affected:  1. Neuromusculoskeletal  2. Movement Related Activities and Participation Affected:  1. General Tasks and Demands  2. Mobility  3. Self Care  4. Community, Social and Civic Life   CLINICAL PRESENTATION:   CLINICAL DECISION MAKING:   Outcome Measure: Tool Used: Lower Extremity Functional Scale (LEFS)  Score:  Initial: 33/80 Most Recent: 48/80 (Date: -3/13/18 )   Interpretation of Score: 20 questions each scored on a 5 point scale with 0 representing \"extreme difficulty or unable to perform\" and 4 representing \"no difficulty\". The lower the score, the greater the functional disability. 80/80 represents no disability.   Minimal detectable change is 9 points. Score 80 79-63 62-48 47-32 31-16 15-1 0   Modifier CH CI CJ CK CL CM CN       · Patient is expected to demonstrate progress in strength, range of motion, balance, coordination and functional technique to increase independence with with gait. Reason for Services/Other Comments:  · Patient has been observed to have decreased strength and ROM before, during or after an intervention. TREATMENT:   (In addition to Assessment/Re-Assessment sessions the following treatments were rendered)  THERAPEUTIC EXERCISE: (see grid for minutes):  Exercises per grid below to improve mobility and strength. Required moderate visual, verbal, manual and tactile cues to promote proper body alignment, promote proper body posture and promote proper body mechanics. Progressed resistance, range and repetitions as indicated. MANUAL THERAPY: (see grid for minutes): Joint mobilization and Soft tissue mobilization was utilized and necessary because of the patient's restricted joint motion, painful spasm and loss of articular motion. MODALITIES: (see grid for minutes): *  Ultrasound was used today secondary to the patient having symptomatic soft tissue calcification. Ultrasound was used today to reduce pain, reduce spasm, reduce joint stiffness, increase muscle flexibility, increase tendon flexibility and increase ligament flexibility. *  Iontophoresis was used today secondary to the patient having thick adhesive scars and was used for the topical delivery of dexa into the patient's to reduce pain. *  Electrical Stimulation Therapy (IFC) was provided with intensity adjusted throughout treatment to patient tolerance. to reduce pain       *  Cold Pack Therapy in order to provide analgesia and reduce inflammation and edema. *  Hot Pack Therapy in order to relieve muscle spasm.      Date: 3/22/18  (visit 15) 3/26/18  (visit 16) 3/29/18  (visit 17) 4-3-18  (Visit 18)  4-11-18  (Visit 19)   suha 4-17-18  (Visit 20)  4-25-18  (Visit 21)  5-1-18  (Visit 22)  5-10-18  (Visit 23)  5-17-18  (Visit 24)  5-24-18  (visit 25)    Modalities: 10 min 10 min 10 min 10 mins           US with keto              Whirlpool cold then warm repeat repeat 5 min each B LE in cold then hot whirlpool Repeat                         Therapeutic Exercise: Towel crunches and inv/ev              RDL 8# MB taps to table              [place] board 4 dir              Long sit Ankle pumps with mirror              [place]               edu on HEP              treadmill    Walking heel raises forward and reverse 2L following needling           Small circleboard              Manual Therapy: 30 miins 30 mins 40 min 35 mins  40 mins 35 mins 40 mins  40 mins  40 mins  45 mins  45 mins    Joints stabs  repeat repeat FDN B L/S L4-S1 B lateral hamstring proximal, medial hamstring medial, B posterior peroneals proximal, and distal gastroc/soleous Repeat  Repeat  Repeat  Repeat  Performed  Repeat with STM following needling into the lower legs  Repeat  Repeat    MFR intrinsics between MT and prox first ray PF mobs   L/S HVLA  X 1 B in sideyling with cavitations. Repeat  Repeat  Repeat successful x2  successful with several cavitations lower back Performed  Successful bilaterally Repeat in lower back but not SIJ  Repeat successful multiple cavitations   HEP: Provided HEP handout on 1/11/18  Treatment/Session Assessment: Pt was able to tolerate the resting needling today and felt much better following treatment as well as following the manipulation with multiple cavitations bilaterally in the lower back and SIJ. Continue with weekly needling and pt is recommended to get appt with rheumatologist asap. Pt is continuing with core and hip stability exercises at home.      · Pain/ Symptoms: Initial:   1/10 \"Im waiting to get a referral from the doctor for the rheumatologist.\"   Post Session:  0/10 No increased pain ·   Compliance with Program/Exercises: compliant  · Recommendations/Intent for next treatment session: \"Next visit will focus on advancements to more challenging activities\".    Total Treatment Duration:  PT Patient Time In/Time Out  Time In: 0800  Time Out: [address], PT, DPT

## 2018-05-29 ENCOUNTER — HOSPITAL ENCOUNTER (OUTPATIENT)
Dept: PHYSICAL THERAPY | Age: 23
Discharge: HOME | End: 2018-05-29
Payer: SELF-PAY

## 2018-06-04 ENCOUNTER — HOSPITAL ENCOUNTER (OUTPATIENT)
Dept: PHYSICAL THERAPY | Age: 23
Discharge: HOME | End: 2018-06-04
Payer: SELF-PAY

## 2018-06-04 PROCEDURE — 97140 MANUAL THERAPY 1/> REGIONS: CPT

## 2018-06-04 NOTE — PROGRESS NOTES
REHABILITATION [facility]  : 1995 [facility] [address]  [facility], [address] [place] [address]  [phone]   [phone]        OUTPATIENT PHYSICAL THERAPY:Daily Note 2018        ICD-10: Treatment Diagnosis: Pain in left foot (M79.672)Effusion, left foot (M25.475)  Precautions/Allergies:   Erythromycin   Fall Risk Score: 0 (? 5 = High Risk)  MD Orders: Evaluation and treatment  MEDICAL/REFERRING DIAGNOSIS:  Complex regional pain syndrome I, unspecified [G90.50]  Pain in left foot [M79.672]   DATE OF ONSET: sp sesmoidectomy 19 with 6 mos hx of on/off pain prior to surgery   REFERRING PHYSICIAN: Mir Dennis MD  RETURN PHYSICIAN APPOINTMENT: TBD     INITIAL ASSESSMENT:  Ms. Klever Crawford presents with decreased strength R LE, decreased ROM, pain, and swelling L foot. Her symptoms are consistent with right s/p sesamoidectomy and complex regional pain syndrome. She has a high level of irritability and pain. She reports frequent episodes of foot swelling, pain, trophic changes with insidious onset. She will benefit from PT serivces with focus on pain control and de-sensitization so she can return to PLOF. PROBLEM LIST (Impacting functional limitations):  1. Decreased Strength  2. Decreased ADL/Functional Activities  3. Decreased Ambulation Ability/Technique  4. Decreased Balance  5. Increased Pain  6. Decreased Flexibility/Joint Mobility INTERVENTIONS PLANNED:  1. Balance Exercise  2. Cold  3. Cryotherapy  4. Electrical Stimulation  5. Gait Training  6. Heat  7. Home Exercise Program (HEP)  8. Manual Therapy  9. Range of Motion (ROM)  10. Therapeutic Activites  11. Therapeutic Exercise/Strengthening   12. Aquatics   TREATMENT PLAN:  Effective Dates: 18 to 18. Frequency/Duration: 1-2 times a week for 4 more weeks  GOALS: (Goals have been discussed and agreed upon with patient.)  Short-Term Functional Goals: Time Frame: 2 weeks   1.  Patient will be independent with HEP without pain.(MET)  2. Patient will have improved gait pattern WNL wearing gym shoes with cushion and rigid base, allowing her to ambulate 10 minutes without pain.(MET)   3. Patient will have improved SLB to 30 seconds without pain improving intrinsic foot strength. (MET)  Discharge Goals: Time Frame: 12 weeks  1. Patient will have improved LEFS to > 60/80 allowing her to return to community participation without pain.(progressing)  2. Patient will have improved mmt to 5-/5 B LE allowing her to progress to ascending/descending stairs without pain.(progressing)  3. 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 Rehabilitation Potential For Stated Goals: Good                HISTORY:   History of Present Injury/Illness (Reason for Referral):  22 y/o F with hx of left foot pain at the ball of her foot. She has been diagnosed with sesamoiditis and was making progress with PT services but had a sudden severe increase in pain and swelling and had a left sesamoidectomy on 11/29/17. She continues to have pain left foot 8/10 intensity daily. She has trophic changes and has been diagnosed with CRPS. She has easy flare ups without   Past Medical History/Comorbidities:   Ms. Yesika Bertrand  has no past medical history on file. Ms. Yesika Bertrand  has a past surgical history that includes hx orthopaedic (Left, age 17) and hx heent. Social History/Living Environment:     Independent, [place] on campus with roommates   Prior Level of Function/Work/Activity:  Independent with running and Equestrian  Previous Treatment Approaches:          Prior PT, she never got her prescribed orthotics   Current Medications:    Current Outpatient Prescriptions:     Lisdexamfetamine (VYVANSE) 50 mg cap, Take 50 mg by mouth every morning.   \"for anxiety\", Disp: , Rfl:    Date Last Reviewed:  6/4/2018   EXAMINATION:   Observation/Orthostatic Postural Assessment: Left foot purple coloration (especially 1st digit), scar is clean and healed, toe nail dry and cracking, cracking skin lateral foot   Palpation:          Sensitive with light touch left plantar surface   ROM:     AROM/PROM  DF R 20/25 L 10/15  PF R 45/60 L 15/20   IN R 30/40 L 15/20  EV R 20/25 L 5/10   First MTP flexion R 45/50 L 10/15 deg with pain  First MTP extension R 45/60 deg L 5/10 deg with pain    1/23/18  AROM and PROM WNL L DF/TN/IN/EV/first ray MTP ext and flexion   Strength:     mmt  Hip abduction 4/5 B  Hip extension 4/5 B  Ankle DF R 5/5 L 4-/5  Ankle PF R 5/5 L 4-/5  Ankle IN R 5/5 L 4-/5  Ankle EV R 5/5 L 4-/5    1/23/18 mmt  Ankle DF L 4+/5  Ankle PF L (mmt not WB testing)4+/5  Ankle IN L 4+/5  Ankle EV L 4+/5    3/13/18  Hip abduction 4+/5 B   Special Tests:          n/a  Neurological Screen:        Sensation: llight touch intact   Functional Mobility:         Gait/Ambulation:  Immobilizer boot left ankle with increased L LE ER with stance and decreased hip extension         3/13/18Functional lunge: R good L knee valgus and navicular drop   Balance:          SLB unable L LE with pain R good          3/13/18 SLB R good L minimal navicular drop with hip IR and knee valgus   Body Structures Involved:  1. Bones  2. Joints  3. Muscles Body Functions Affected:  1. Neuromusculoskeletal  2. Movement Related Activities and Participation Affected:  1. General Tasks and Demands  2. Mobility  3. Self Care  4. Community, Social and Civic Life   CLINICAL PRESENTATION:   CLINICAL DECISION MAKING:   Outcome Measure: Tool Used: Lower Extremity Functional Scale (LEFS)  Score:  Initial: 33/80 Most Recent: 48/80 (Date: -3/13/18 )   Interpretation of Score: 20 questions each scored on a 5 point scale with 0 representing \"extreme difficulty or unable to perform\" and 4 representing \"no difficulty\". The lower the score, the greater the functional disability. 80/80 represents no disability.   Minimal detectable change is 9 points. Score 80 79-63 62-48 47-32 31-16 15-1 0   Modifier CH CI CJ CK CL CM CN       · Patient is expected to demonstrate progress in strength, range of motion, balance, coordination and functional technique to increase independence with with gait. Reason for Services/Other Comments:  · Patient has been observed to have decreased strength and ROM before, during or after an intervention. TREATMENT:   (In addition to Assessment/Re-Assessment sessions the following treatments were rendered)  THERAPEUTIC EXERCISE: (see grid for minutes):  Exercises per grid below to improve mobility and strength. Required moderate visual, verbal, manual and tactile cues to promote proper body alignment, promote proper body posture and promote proper body mechanics. Progressed resistance, range and repetitions as indicated. MANUAL THERAPY: (see grid for minutes): Joint mobilization and Soft tissue mobilization was utilized and necessary because of the patient's restricted joint motion, painful spasm and loss of articular motion. MODALITIES: (see grid for minutes): *  Ultrasound was used today secondary to the patient having symptomatic soft tissue calcification. Ultrasound was used today to reduce pain, reduce spasm, reduce joint stiffness, increase muscle flexibility, increase tendon flexibility and increase ligament flexibility. *  Iontophoresis was used today secondary to the patient having thick adhesive scars and was used for the topical delivery of dexa into the patient's to reduce pain. *  Electrical Stimulation Therapy (IFC) was provided with intensity adjusted throughout treatment to patient tolerance. to reduce pain       *  Cold Pack Therapy in order to provide analgesia and reduce inflammation and edema. *  Hot Pack Therapy in order to relieve muscle spasm.      Date: 4-11-18  (Visit 19)   recert  0-11-52  (Visit 20)  4-25-18  (Visit 21)  5-1-18  (Visit 22)  5-10-18  (Visit 23) 5-17-18  (Visit 24)  5-24-18  (visit 25)  6-4-18  (Visit 26)    Modalities:           US with keto           Wh[employer] cold then warm                      Therapeutic Exercise: Towel crunches and inv/ev           RDL 8# MB taps to table           [place] board 4 dir           Long sit Ankle pumps with mirror           [place]            edu on HEP           treadmill           Small circleboard           Manual Therapy: 40 mins 35 mins 40 mins  40 mins  40 mins  45 mins  45 mins  45 mins    Joints stabs  Repeat  Repeat  Repeat  Performed  Repeat with STM following needling into the lower legs  Repeat  Repeat  Repeat    MFR intrinsics between MT and prox first ray PF mobs Repeat  Repeat successful x2  successful with several cavitations lower back Performed  Successful bilaterally Repeat in lower back but not SIJ  Repeat successful multiple cavitations Repeat    HEP: Provided HEP handout on 1/11/18  Treatment/Session Assessment: pt compalining of back pain today more than the legs. No trigger points were found but the back paraspinals were very tight and were needled today but unable to manipulate. Pt was taped in the lower legs for circulation down the leg. Continue with POC 1 time a week for needling. · Pain/ Symptoms: Initial:   0/10 \"Im doing ok. I have an appt for the rheumatologist on the 16th. \"   Post Session:  0/10 No increased pain ·   Compliance with Program/Exercises: compliant  · Recommendations/Intent for next treatment session: \"Next visit will focus on advancements to more challenging activities\".    Total Treatment Duration:  PT Patient Time In/Time Out  Time In: 0845  Time Out: [address], PT, DPT

## 2018-06-11 ENCOUNTER — HOSPITAL ENCOUNTER (OUTPATIENT)
Dept: PHYSICAL THERAPY | Age: 23
Discharge: HOME | End: 2018-06-11
Payer: SELF-PAY

## 2018-06-11 PROCEDURE — 97140 MANUAL THERAPY 1/> REGIONS: CPT

## 2018-06-11 PROCEDURE — 97035 APP MDLTY 1+ULTRASOUND EA 15: CPT

## 2018-06-11 NOTE — PROGRESS NOTES
[facility]  : [address] [facility] [address]  [address] [place] [address].  [phone]   [phone]        OUTPATIENT PHYSICAL THERAPY:Daily Note 2018        ICD-10: Treatment Diagnosis: Pain in left foot (M79.672)Effusion, left foot (M25.475)  Precautions/Allergies:   Erythromycin   Fall Risk Score: 0 (? 5 = High Risk)  MD Orders: Evaluation and treatment  MEDICAL/REFERRING DIAGNOSIS:  Complex regional pain syndrome I, unspecified [G90.50]  Pain in left foot [M79.672]   DATE OF ONSET: sp sesmoidectomy 17 with 6 mos hx of on/off pain prior to surgery   REFERRING PHYSICIAN: Annika Reid MD  RETURN PHYSICIAN APPOINTMENT: TBD     INITIAL ASSESSMENT:  Ms. Gael Raymundo presents with decreased strength R LE, decreased ROM, pain, and swelling L foot. Her symptoms are consistent with right s/p sesamoidectomy and complex regional pain syndrome. She has a high level of irritability and pain. She reports frequent episodes of foot swelling, pain, trophic changes with insidious onset. She will benefit from PT serivces with focus on pain control and de-sensitization so she can return to PLOF. PROBLEM LIST (Impacting functional limitations):  1. Decreased Strength  2. Decreased ADL/Functional Activities  3. Decreased Ambulation Ability/Technique  4. Decreased Balance  5. Increased Pain  6. Decreased Flexibility/Joint Mobility INTERVENTIONS PLANNED:  1. Balance Exercise  2. Cold  3. Cryotherapy  4. Electrical Stimulation  5. Gait Training  6. Heat  7. Home Exercise Program (HEP)  8. Manual Therapy  9. Range of Motion (ROM)  10. Therapeutic Activites  11. Therapeutic Exercise/Strengthening   12. Aquatics   TREATMENT PLAN:  Effective Dates: 18 to 18. Frequency/Duration: 1-2 times a week for 4 more weeks  GOALS: (Goals have been discussed and agreed upon with patient.)  Short-Term Functional Goals: Time Frame: 2 weeks   1.  Patient will be independent with HEP without pain.(MET)  2. Patient will have improved gait pattern WNL wearing gym shoes with cushion and rigid base, allowing her to ambulate 10 minutes without pain.(MET)   3. Patient will have improved SLB to 30 seconds without pain improving intrinsic foot strength. (MET)  Discharge Goals: Time Frame: 12 weeks  1. Patient will have improved LEFS to > 60/80 allowing her to return to community participation without pain.(progressing)  2. Patient will have improved mmt to 5-/5 B LE allowing her to progress to ascending/descending stairs without pain.(progressing)  3. 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 Rehabilitation Potential For Stated Goals: Good                HISTORY:   History of Present Injury/Illness (Reason for Referral):  24 y/o F with hx of left foot pain at the ball of her foot. She has been diagnosed with sesamoiditis and was making progress with PT services but had a sudden severe increase in pain and swelling and had a left sesamoidectomy on 11/29/17. She continues to have pain left foot 8/10 intensity daily. She has trophic changes and has been diagnosed with CRPS. She has easy flare ups without   Past Medical History/Comorbidities:   Ms. Anson Connell  has no past medical history on file. Ms. Anson Connell  has a past surgical history that includes hx orthopaedic (Left, age 17) and hx heent. Social History/Living Environment:     Independent, lives on campus with roommates   Prior Level of Function/Work/Activity:  Independent with running and Equestrian  Previous Treatment Approaches:          Prior PT, she never got her prescribed orthotics   Current Medications:    Current Outpatient Prescriptions:     Lisdexamfetamine (VYVANSE) 50 mg cap, Take 50 mg by mouth every morning.   \"for anxiety\", Disp: , Rfl:    Date Last Reviewed:  6/11/2018   EXAMINATION:   Observation/Orthostatic Postural Assessment: Left foot purple coloration (especially 1st digit), scar is clean and healed, toe nail dry and cracking, cracking skin lateral foot   Palpation:          Sensitive with light touch left plantar surface   ROM:     AROM/PROM  DF R 20/25 L 10/15  PF R 45/60 L 15/20   IN R 30/40 L 15/20  EV R 20/25 L 5/10   First MTP flexion R 45/50 L 10/15 deg with pain  First MTP extension R 45/60 deg L 5/10 deg with pain    1/23/18  AROM and PROM WNL L DF/MS/IN/EV/first ray MTP ext and flexion   Strength:     mmt  Hip abduction 4/5 B  Hip extension 4/5 B  Ankle DF R 5/5 L 4-/5  Ankle PF R 5/5 L 4-/5  Ankle IN R 5/5 L 4-/5  Ankle EV R 5/5 L 4-/5    1/23/18 mmt  Ankle DF L 4+/5  Ankle PF L (mmt not WB testing)4+/5  Ankle IN L 4+/5  Ankle EV L 4+/5    3/13/18  Hip abduction 4+/5 B   Special Tests:          n/a  Neurological Screen:        Sensation: llight touch intact   Functional Mobility:         Gait/Ambulation:  Immobilizer boot left ankle with increased L LE ER with stance and decreased hip extension         3/13/18Functional lunge: R good L knee valgus and navicular drop   Balance:          SLB unable L LE with pain R good          3/13/18 SLB R good L minimal navicular drop with hip IR and knee valgus   Body Structures Involved:  1. Bones  2. Joints  3. Muscles Body Functions Affected:  1. Neuromusculoskeletal  2. Movement Related Activities and Participation Affected:  1. General Tasks and Demands  2. Mobility  3. Self Care  4. Community, Social and Civic Life   CLINICAL PRESENTATION:   CLINICAL DECISION MAKING:   Outcome Measure: Tool Used: Lower Extremity Functional Scale (LEFS)  Score:  Initial: 33/80 Most Recent: 48/80 (Date: -3/13/18 )   Interpretation of Score: 20 questions each scored on a 5 point scale with 0 representing \"extreme difficulty or unable to perform\" and 4 representing \"no difficulty\". The lower the score, the greater the functional disability. 80/80 represents no disability.   Minimal detectable change is 9 points. Score 80 79-63 62-48 47-32 31-16 15-1 0   Modifier CH CI CJ CK CL CM CN       · Patient is expected to demonstrate progress in strength, range of motion, balance, coordination and functional technique to increase independence with with gait. Reason for Services/Other Comments:  · Patient has been observed to have decreased strength and ROM before, during or after an intervention. TREATMENT:   (In addition to Assessment/Re-Assessment sessions the following treatments were rendered)  THERAPEUTIC EXERCISE: (see grid for minutes):  Exercises per grid below to improve mobility and strength. Required moderate visual, verbal, manual and tactile cues to promote proper body alignment, promote proper body posture and promote proper body mechanics. Progressed resistance, range and repetitions as indicated. MANUAL THERAPY: (see grid for minutes): Joint mobilization and Soft tissue mobilization was utilized and necessary because of the patient's restricted joint motion, painful spasm and loss of articular motion. MODALITIES: (see grid for minutes): *  Ultrasound was used today secondary to the patient having symptomatic soft tissue calcification. Ultrasound was used today to reduce pain, reduce spasm, reduce joint stiffness, increase muscle flexibility, increase tendon flexibility and increase ligament flexibility. *  Iontophoresis was used today secondary to the patient having thick adhesive scars and was used for the topical delivery of dexa into the patient's to reduce pain. *  Electrical Stimulation Therapy (IFC) was provided with intensity adjusted throughout treatment to patient tolerance. to reduce pain       *  Cold Pack Therapy in order to provide analgesia and reduce inflammation and edema. *  Hot Pack Therapy in order to relieve muscle spasm.      Date: 4-11-18  (Visit 19)   recert  2-52-62  (Visit 20)  4-25-18  (Visit 21)  5-1-18  (Visit 22)  5-10-18  (Visit 23) 5-17-18  (Visit 24)  5-24-18  (visit 25)  6-4-18  (Visit 26)  6-11-18  (Visit 27)    Modalities:         8 mins    US with keto         L plantar surface of foot over incision x 8 mins    [employer] cold then warm                        Therapeutic Exercise: Towel crunches and inv/ev            RDL 8# MB taps to table            [place] board 4 dir            Long sit Ankle pumps with mirror            [place]             edu on HEP            treadmill            Small circleboard            Manual Therapy: 40 mins 35 mins 40 mins  40 mins  40 mins  45 mins  45 mins  45 mins  32 mins    FDN Repeat  Repeat  Repeat  Performed  Repeat with STM following needling into the lower legs  Repeat  Repeat  Repeat  Needling with manip following needling over the hamstrings and gastrocs as well as LB   MFR intrinsics between MT and prox first ray PF mobs Repeat  Repeat successful x2  successful with several cavitations lower back Performed  Successful bilaterally Repeat in lower back but not SIJ  Repeat successful multiple cavitations Repeat     HEP: Provided HEP handout on 1/11/18  Treatment/Session Assessment: Pt will not return for 2 weeks due to going home for an appt and then her testing for the [facility]. Pt will continue to progress with needling and will need to continue with stretching on her own due to pain in the calf today and trigger points found in the L gastroc/soleus. · Pain/ Symptoms: Initial:   0/10 \"Im a little stiff and my foot hurts again. \"  Post Session:  0/10 No increased pain ·   Compliance with Program/Exercises: compliant  · Recommendations/Intent for next treatment session: \"Next visit will focus on advancements to more challenging activities\".    Total Treatment Duration:  PT Patient Time In/Time Out  Time In: 1015  Time Out: [id number] [facility], PT, DPT

## 2018-06-25 ENCOUNTER — HOSPITAL ENCOUNTER (OUTPATIENT)
Dept: PHYSICAL THERAPY | Age: 23
Discharge: HOME | End: 2018-06-25
Payer: SELF-PAY

## 2018-06-25 PROCEDURE — 97035 APP MDLTY 1+ULTRASOUND EA 15: CPT

## 2018-06-25 PROCEDURE — 97140 MANUAL THERAPY 1/> REGIONS: CPT

## 2018-06-25 NOTE — THERAPY RECERTIFICATION
[facility]  : 1995 [facility] [address]  [address] [facility] [address]  [phone]   [phone]        OUTPATIENT PHYSICAL THERAPY:Daily Note and Recertification         ICD-10: Treatment Diagnosis: Pain in left foot (M79.672)Effusion, left foot (M25.475)  Precautions/Allergies:   Erythromycin   Fall Risk Score: 0 (? 5 = High Risk)  MD Orders: Evaluation and treatment  MEDICAL/REFERRING DIAGNOSIS:  Complex regional pain syndrome I, unspecified [G90.50]  Pain in left foot [M79.672]   DATE OF ONSET: sp sesmoidectomy 19 with 6 mos hx of on/off pain prior to surgery   REFERRING PHYSICIAN: Katia Dela Cruz MD  RETURN PHYSICIAN APPOINTMENT: TBD     INITIAL ASSESSMENT:  Ms. Benjy Arevalo presents with decreased strength R LE, decreased ROM, pain, and swelling L foot. Her symptoms are consistent with right s/p sesamoidectomy and complex regional pain syndrome. She has a high level of irritability and pain. She reports frequent episodes of foot swelling, pain, trophic changes with insidious onset. She will benefit from PT serivces with focus on pain control and de-sensitization so she can return to [facility]. PROBLEM LIST (Impacting functional limitations):  1. Decreased Strength  2. Decreased ADL/Functional Activities  3. Decreased Ambulation Ability/Technique  4. Decreased Balance  5. Increased Pain  6. Decreased Flexibility/Joint Mobility INTERVENTIONS PLANNED:  1. Balance Exercise  2. Cold  3. Cryotherapy  4. Electrical Stimulation  5. Gait Training  6. Heat  7. Home Exercise Program (HEP)  8. Manual Therapy  9. Range of Motion (ROM)  10. Therapeutic Activites  11. Therapeutic Exercise/Strengthening   12. Aquatics   TREATMENT PLAN:  Effective Dates: 18 to 2018.   Frequency/Duration: 1-2 times a week for 4 more weeks  GOALS: (Goals have been discussed and agreed upon with patient.)  Short-Term Functional Goals: Time Frame: 2 weeks   1. Patient will be independent with HEP without pain.(MET)  2. Patient will have improved gait pattern WNL wearing gym shoes with cushion and rigid base, allowing her to ambulate 10 minutes without pain.(MET)   3. Patient will have improved SLB to 30 seconds without pain improving intrinsic foot strength. (MET)  Discharge Goals: Time Frame: 12 weeks  1. Patient will have improved LEFS to > 60/80 allowing her to return to community participation without pain.(MET)  2. Patient will have improved mmt to 5-/5 B LE allowing her to progress to ascending/descending stairs without pain.(progressing)  3. 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 (Progressing)   Rehabilitation Potential For Stated Goals: Good  Regarding [facility] Ananda's therapy, I certify that the treatment plan above will be carried out by a therapist or under their direction. Thank you for this referral,  Ananda Benites, PT, DPT     Referring Physician Signature: Bryson Carmona MD          Date                          HISTORY:   History of Present Injury/Illness (Reason for Referral):  22 y/o F with hx of left foot pain at the ball of her foot. She has been diagnosed with sesamoiditis and was making progress with PT services but had a sudden severe increase in pain and swelling and had a left sesamoidectomy on 11/29/17. She continues to have pain left foot 8/10 intensity daily. She has trophic changes and has been diagnosed with CRPS. She has easy flare ups without   Past Medical History/Comorbidities:   Ms. Rohan Paul  has no past medical history on file. Ms. Rohan Paul  has a past surgical history that includes hx orthopaedic (Left, age 17) and hx heent.   Social History/Living Environment:     Independent, [place] campus with roommates   Prior Level of Function/Work/Activity:  Independent with running and Equestrian  Previous Treatment Approaches:          Prior PT, she never got her prescribed orthotics   Current Medications:    Current Outpatient Prescriptions:     Lisdexamfetamine (VYVANSE) 50 mg cap, Take 50 mg by mouth every morning. \"for anxiety\", Disp: , Rfl:    AMLODIPINE BESYOATE 5 MG (Not used for BP but for the possible Raynauds)  TIZANIDINE 4mg : muscle relaxers   Date Last Reviewed:  6/25/2018   EXAMINATION:   Observation/Orthostatic Postural Assessment:          Left foot purple coloration (especially 1st digit), scar is clean and healed, toe nail dry and cracking, cracking skin lateral foot   Palpation:          Sensitive with light touch left plantar surface   ROM:     AROM/PROM  DF R 20/25 L 10/15  PF R 45/60 L 15/20   IN R 30/40 L 15/20  EV R 20/25 L 5/10   First MTP flexion R 45/50 L 10/15 deg with pain  First MTP extension R 45/60 deg L 5/10 deg with pain    1/23/18  AROM and PROM WNL L DF/WA/IN/EV/first ray MTP ext and flexion   Strength:     mmt  Hip abduction 4/5 B  Hip extension 4/5 B  Ankle DF R 5/5 L 4-/5  Ankle PF R 5/5 L 4-/5  Ankle IN R 5/5 L 4-/5  Ankle EV R 5/5 L 4-/5    1/23/18 mmt  Ankle DF L 4+/5  Ankle PF L (mmt not WB testing)4+/5  Ankle IN L 4+/5  Ankle EV L 4+/5    3/13/18  Hip abduction 4+/5 B   Special Tests:          n/a  Neurological Screen:        Sensation: llight touch intact   Functional Mobility:         Gait/Ambulation:  Immobilizer boot left ankle with increased L LE ER with stance and decreased hip extension         3/13/18Functional lunge: R good L knee valgus and navicular drop   Balance:          SLB unable L LE with pain R good          3/13/18 SLB R good L minimal navicular drop with hip IR and knee valgus   Body Structures Involved:  1. Bones  2. Joints  3. Muscles Body Functions Affected:  1. Neuromusculoskeletal  2. Movement Related Activities and Participation Affected:  1. General Tasks and Demands  2. Mobility  3. Self Care  4.  Community, Social and Civic Life   CLINICAL PRESENTATION:   CLINICAL DECISION MAKING: Outcome Measure: Tool Used: Lower Extremity Functional Scale (LEFS)  Score:  Initial: 33/80 Most Recent: 48/80 (Date: -3/13/18 )                        70/80 (Date: 6-25-18)   Interpretation of Score: 20 questions each scored on a 5 point scale with 0 representing \"extreme difficulty or unable to perform\" and 4 representing \"no difficulty\". The lower the score, the greater the functional disability. 80/80 represents no disability. Minimal detectable change is 9 points. Score 80 79-63 62-48 47-32 31-16 15-1 0   Modifier CH CI CJ CK CL CM CN       · Patient is expected to demonstrate progress in strength, range of motion, balance, coordination and functional technique to increase independence with with gait. Reason for Services/Other Comments:  · Patient has been observed to have decreased strength and ROM before, during or after an intervention. TREATMENT:   (In addition to Assessment/Re-Assessment sessions the following treatments were rendered)  THERAPEUTIC EXERCISE: (see grid for minutes):  Exercises per grid below to improve mobility and strength. Required moderate visual, verbal, manual and tactile cues to promote proper body alignment, promote proper body posture and promote proper body mechanics. Progressed resistance, range and repetitions as indicated. MANUAL THERAPY: (see grid for minutes): Joint mobilization and Soft tissue mobilization was utilized and necessary because of the patient's restricted joint motion, painful spasm and loss of articular motion. MODALITIES: (see grid for minutes): *  Ultrasound was used today secondary to the patient having symptomatic soft tissue calcification. Ultrasound was used today to reduce pain, reduce spasm, reduce joint stiffness, increase muscle flexibility, increase tendon flexibility and increase ligament flexibility.        *  Iontophoresis was used today secondary to the patient having thick adhesive scars and was used for the topical delivery of dexa into the patient's to reduce pain. *  Electrical Stimulation Therapy (IFC) was provided with intensity adjusted throughout treatment to patient tolerance. to reduce pain       *  Cold Pack Therapy in order to provide analgesia and reduce inflammation and edema. *  Hot Pack Therapy in order to relieve muscle spasm. Date: 4-11-18  (Visit 19)   recert  8-10-44  (Visit 20)  4-25-18  (Visit 21)  5-1-18  (Visit 22)  5-10-18  (Visit 23)  5-17-18  (Visit 24)  5-24-18  (visit 25)  6-4-18  (Visit 26)  6-11-18  (Visit 27)  6-25-18  (Visit 28)  Recert    Modalities:         8 mins  8 mins    US with keto         L plantar surface of foot over incision x 8 mins  Repeat    Whirlpool cold then warm                          Therapeutic Exercise: Towel crunches and inv/ev             RDL 8# MB taps to table             [place] board 4 dir             Long sit Ankle pumps with mirror             [place]              edu on HEP             treadmill             Small circleboard             Manual Therapy: 40 mins 35 mins 40 mins  40 mins  40 mins  45 mins  45 mins  45 mins  32 mins  20 mins    FDN Repeat  Repeat  Repeat  Performed  Repeat with STM following needling into the lower legs  Repeat  Repeat  Repeat  Needling with manip following needling over the hamstrings and gastrocs as well as LB Repeat    MFR intrinsics between MT and prox first ray PF mobs Repeat  Repeat successful x2  successful with several cavitations lower back Performed  Successful bilaterally Repeat in lower back but not SIJ  Repeat successful multiple cavitations Repeat      HEP: Provided HEP handout on 1/11/18  Treatment/Session Assessment: Pt to continue with 4 more weeks due to more testing and still having some pain in the foot and the legs. Pt is concerned with the pain in the foot coming back. Pt has made improvements but has been very slow and is continuing to work on endurance and return to running.       · Pain/ Symptoms: Initial:  0/10 \"Im going back to the doctor next week for more blood work. The doctor said she thinks its raynauds and the foot is hurting again in that same spot since starting this new medication. \"  Post Session:  0/10 No increased pain ·   Compliance with Program/Exercises: compliant  · Recommendations/Intent for next treatment session: \"Next visit will focus on advancements to more challenging activities\".    Total Treatment Duration:  PT Patient Time In/Time Out  Time In: 1200  Time Out: [address], PT, DPT

## 2018-08-29 NOTE — THERAPY DISCHARGE
[facility]  : 1995 [facility] [address]  [address]  [phone]   [phone]        OUTPATIENT PHYSICAL THERAPY:Discontinuation Summary 2018        ICD-10: Treatment Diagnosis: Pain in left foot (M79.672)Effusion, left foot (M25.475)  Precautions/Allergies:   Erythromycin   Fall Risk Score: 0 (? 5 = High Risk)  MD Orders: Evaluation and treatment  MEDICAL/REFERRING DIAGNOSIS:  Complex regional pain syndrome I, unspecified [G90.50]  Pain in left foot [M79.672]   DATE OF ONSET: sp sesmoidectomy 17 with 6 mos hx of on/off pain prior to surgery   REFERRING PHYSICIAN: Meryl Sow MD  RETURN PHYSICIAN APPOINTMENT: TBD     INITIAL ASSESSMENT:  Ms. Man President presents with decreased strength R LE, decreased ROM, pain, and swelling L foot. Her symptoms are consistent with right s/p sesamoidectomy and complex regional pain syndrome. She has a high level of irritability and pain. She reports frequent episodes of foot swelling, pain, trophic changes with insidious onset. She will benefit from PT serivces with focus on pain control and de-sensitization so she can return to [facility]. PROBLEM LIST (Impacting functional limitations):  1. Decreased Strength  2. Decreased ADL/Functional Activities  3. Decreased Ambulation Ability/Technique  4. Decreased Balance  5. Increased Pain  6. Decreased Flexibility/Joint Mobility INTERVENTIONS PLANNED:  1. Balance Exercise  2. Cold  3. Cryotherapy  4. Electrical Stimulation  5. Gait Training  6. Heat  7. Home Exercise Program (HEP)  8. Manual Therapy  9. Range of Motion (ROM)  10. Therapeutic Activites  11. Therapeutic Exercise/Strengthening   12. Aquatics   TREATMENT PLAN:  Effective Dates: 18 to 2018.   Frequency/Duration: 1-2 times a week for 4 more weeks  GOALS: (Goals have been discussed and agreed upon with patient.)  Short-Term Functional Goals: Time Frame: 2 weeks 1. Patient will be independent with HEP without pain.(MET)  2. Patient will have improved gait pattern WNL wearing gym shoes with cushion and rigid base, allowing her to ambulate 10 minutes without pain.(MET)   3. Patient will have improved SLB to 30 seconds without pain improving intrinsic foot strength. (MET)  Discharge Goals: Time Frame: 12 weeks  1. Patient will have improved LEFS to > 60/80 allowing her to return to community participation without pain.(MET)  2. Patient will have improved mmt to 5-/5 B LE allowing her to progress to ascending/descending stairs without pain.(progressing)  3. 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 (Progressing)   Rehabilitation Potential For Stated Goals: Good  Regarding [facility] Ananda's therapy, I certify that the treatment plan above will be carried out by a therapist or under their direction. Thank you for this referral,  Izzy Dobbs, PT, DPT     Referring Physician Signature: Alcides Howard MD          Date                          HISTORY:   History of Present Injury/Illness (Reason for Referral):  24 y/o F with hx of left foot pain at the ball of her foot. She has been diagnosed with sesamoiditis and was making progress with PT services but had a sudden severe increase in pain and swelling and had a left sesamoidectomy on 11/29/17. She continues to have pain left foot 8/10 intensity daily. She has trophic changes and has been diagnosed with CRPS. She has easy flare ups without   Past Medical History/Comorbidities:   Ms. Nellie Mckeon  has no past medical history on file. Ms. Nellie Mckeon  has a past surgical history that includes hx orthopaedic (Left, age 17) and hx heent.   Social History/Living Environment:     Independent, [place] campus with roommates   Prior Level of Function/Work/Activity:  Independent with running and Equestrian  Previous Treatment Approaches:          Prior PT, she never got her prescribed orthotics   Current Medications:    Current Outpatient Prescriptions:     Lisdexamfetamine (VYVANSE) 50 mg cap, Take 50 mg by mouth every morning. \"for anxiety\", Disp: , Rfl:    AMLODIPINE BESYOATE 5 MG (Not used for BP but for the possible Raynauds)  TIZANIDINE 4mg : muscle relaxers   Date Last Reviewed:  6/25/2018   EXAMINATION:   Observation/Orthostatic Postural Assessment:          Left foot purple coloration (especially 1st digit), scar is clean and healed, toe nail dry and cracking, cracking skin lateral foot   Palpation:          Sensitive with light touch left plantar surface   ROM:     AROM/PROM  DF R 20/25 L 10/15  PF R 45/60 L 15/20   IN R 30/40 L 15/20  EV R 20/25 L 5/10   First MTP flexion R 45/50 L 10/15 deg with pain  First MTP extension R 45/60 deg L 5/10 deg with pain    1/23/18  AROM and PROM WNL L DF/ME/IN/EV/first ray MTP ext and flexion   Strength:     mmt  Hip abduction 4/5 B  Hip extension 4/5 B  Ankle DF R 5/5 L 4-/5  Ankle PF R 5/5 L 4-/5  Ankle IN R 5/5 L 4-/5  Ankle EV R 5/5 L 4-/5    1/23/18 mmt  Ankle DF L 4+/5  Ankle PF L (mmt not WB testing)4+/5  Ankle IN L 4+/5  Ankle EV L 4+/5    3/13/18  Hip abduction 4+/5 B   Special Tests:          n/a  Neurological Screen:        Sensation: llight touch intact   Functional Mobility:         Gait/Ambulation:  Immobilizer boot left ankle with increased L LE ER with stance and decreased hip extension         3/13/18Functional lunge: R good L knee valgus and navicular drop   Balance:          SLB unable L LE with pain R good          3/13/18 SLB R good L minimal navicular drop with hip IR and knee valgus   Body Structures Involved:  1. Bones  2. Joints  3. Muscles Body Functions Affected:  1. Neuromusculoskeletal  2. Movement Related Activities and Participation Affected:  1. General Tasks and Demands  2. Mobility  3. Self Care  4.  Community, Social and Civic Life   CLINICAL PRESENTATION:   CLINICAL DECISION MAKING: Outcome Measure: Tool Used: Lower Extremity Functional Scale (LEFS)  Score:  Initial: 33/80 Most Recent: 48/80 (Date: -3/13/18 )                        70/80 (Date: 6-25-18)   Interpretation of Score: 20 questions each scored on a 5 point scale with 0 representing \"extreme difficulty or unable to perform\" and 4 representing \"no difficulty\". The lower the score, the greater the functional disability. 80/80 represents no disability. Minimal detectable change is 9 points. Score 80 79-63 62-48 47-32 31-16 15-1 0   Modifier CH CI CJ CK CL CM CN       · Patient is expected to demonstrate progress in strength, range of motion, balance, coordination and functional technique to increase independence with with gait. Reason for Services/Other Comments:  · Patient has been observed to have decreased strength and ROM before, during or after an intervention. TREATMENT:   (In addition to Assessment/Re-Assessment sessions the following treatments were rendered)  THERAPEUTIC EXERCISE: (see grid for minutes):  Exercises per grid below to improve mobility and strength. Required moderate visual, verbal, manual and tactile cues to promote proper body alignment, promote proper body posture and promote proper body mechanics. Progressed resistance, range and repetitions as indicated. MANUAL THERAPY: (see grid for minutes): Joint mobilization and Soft tissue mobilization was utilized and necessary because of the patient's restricted joint motion, painful spasm and loss of articular motion. MODALITIES: (see grid for minutes): *  Ultrasound was used today secondary to the patient having symptomatic soft tissue calcification. Ultrasound was used today to reduce pain, reduce spasm, reduce joint stiffness, increase muscle flexibility, increase tendon flexibility and increase ligament flexibility.        *  Iontophoresis was used today secondary to the patient having thick adhesive scars and was used for the topical delivery of dexa into the patient's to reduce pain. *  Electrical Stimulation Therapy (IFC) was provided with intensity adjusted throughout treatment to patient tolerance. to reduce pain       *  Cold Pack Therapy in order to provide analgesia and reduce inflammation and edema. *  Hot Pack Therapy in order to relieve muscle spasm. Date: 4-11-18  (Visit 19)   recert  8-00-01  (Visit 20)  4-25-18  (Visit 21)  5-1-18  (Visit 22)  5-10-18  (Visit 23)  5-17-18  (Visit 24)  5-24-18  (visit 25)  6-4-18  (Visit 26)  6-11-18  (Visit 27)  6-25-18  (Visit 28)  Recert    Modalities:         8 mins  8 mins    US with keto         L plantar surface of foot over incision x 8 mins  Repeat    Whirlpool cold then warm                          Therapeutic Exercise: Towel crunches and inv/ev             RDL 8# MB taps to table             [place] board 4 dir             Long sit Ankle pumps with mirror             [place]              edu on HEP             treadmill             Small circleboard             Manual Therapy: 40 mins 35 mins 40 mins  40 mins  40 mins  45 mins  45 mins  45 mins  32 mins  20 mins    FDN Repeat  Repeat  Repeat  Performed  Repeat with STM following needling into the lower legs  Repeat  Repeat  Repeat  Needling with manip following needling over the hamstrings and gastrocs as well as LB Repeat    MFR intrinsics between MT and prox first ray PF mobs Repeat  Repeat successful x2  successful with several cavitations lower back Performed  Successful bilaterally Repeat in lower back but not SIJ  Repeat successful multiple cavitations Repeat      HEP: Provided HEP handout on 1/11/18  Treatment/Session Assessment: pt was unable to continue with therapy due to class and work schedule. Pt is discontinued.     Suzette Siddiqui, PT, DPT

## 2019-04-23 ENCOUNTER — HOSPITAL ENCOUNTER (OUTPATIENT)
Dept: PHYSICAL THERAPY | Age: 24
Discharge: HOME | End: 2019-04-23
Payer: COMMERCIAL

## 2019-04-23 PROCEDURE — 97110 THERAPEUTIC EXERCISES: CPT

## 2019-04-23 PROCEDURE — 97162 PT EVAL MOD COMPLEX 30 MIN: CPT

## 2019-04-23 PROCEDURE — 97530 THERAPEUTIC ACTIVITIES: CPT

## 2019-04-23 NOTE — THERAPY EVALUATION
[facility]  : 1995 [facility] [address]  [address] [facility]  [phone]   [phone]        OUTPATIENT PHYSICAL THERAPY:Initial Assessment 2019         ICD-10: Treatment Diagnosis: Pain in right foot (M79.671)  Precautions/Allergies:   Erythromycin   Fall Risk Score:     Ambulatory/Rehab Services H2 Model Falls Risk Assessment    Risk Factors:       No Risk Factors Identified Ability to Rise from Chair:       (0)  Ability to rise in a single movement    Falls Prevention Plan:       No modifications necessary   Total: (5 or greater = High Risk): 0     [facility] of [employer]. All Rights Reserved. [facility] Patent #[id number]. Federal Law prohibits the replication, distribution or use without written permission from [employer]     MD Orders: eval and treat sesamoiditits, RSD MEDICAL/REFERRING DIAGNOSIS:  Pain in right foot [M79.671]   DATE OF ONSET: months ago  REFERRING PHYSICIAN: Adan Osuna MD  RETURN PHYSICIAN APPOINTMENT: 19     INITIAL ASSESSMENT:  Ms. Donna Keene presents with multiple complaints of pain and weakness through hips, knees, ankles B that prohibits her from participating in her preferred levels of activity. She will benefit from skilled PT to address the RSD and develop an appropriate HEP to address her needs so she can resume a more active lifestyle successfully. PROBLEM LIST (Impacting functional limitations):  1. Decreased Strength  2. Decreased ADL/Functional Activities  3. Decreased Ambulation Ability/Technique  4. Decreased Balance  5. Increased Pain  6. Decreased Activity Tolerance  7. Decreased [place] with Home Exercise Program INTERVENTIONS PLANNED:  1. Home Exercise Program (HEP)  2. Manual Therapy  3. Therapeutic Activites  4. Therapeutic Exercise/Strengthening    TREATMENT PLAN:  Effective Dates: 2019 TO 2019 (30 days).  Frequency/Duration: 2 times a week for 30 Days  GOALS: (Goals have been discussed and agreed upon with patient.)  Short-Term Functional Goals: Time Frame: 4 weeks  1. Pt to report compliance with HEP. 2. Pt to demonstrate correct ex performance with HEP. 3. Pt to demonstrate understanding of RSD and its successful management. Rehabilitation Potential For Stated Goals: [address] Karley Malave's therapy, I certify that the treatment plan above will be carried out by a therapist or under their direction. 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 R foot pain started off and on and then developed to constant in appprox March. X-rays looked okay. Put in boot almost 2 weeks ago to help calm it down. Numbness and tingling in entire ball of foot. Boot has helped. Likes to go to the gym to run, [employer] [facility] [employer], general conditioning. Both feet go numb. Had lumbar injection to help with N/T in feet and legs. Pt has had therapy multiple times and does the ex's currently. Past Medical History/Comorbidities:   Ms. Tanesha Landis  has no past medical history on file. Ms. Tanesha Lanids  has a past surgical history that includes hx orthopaedic (Left, age 17) and hx heent. Social History/Living Environment:     lives in [facility] with stairs  Prior Level of Function/Work/Activity:  Senior at [employer]:         LEFT  Current Medications:    Current Outpatient Medications:     Lisdexamfetamine (VYVANSE) 50 mg cap, Take 50 mg by mouth every morning.   \"for anxiety\", Disp: , Rfl:  Date Last Reviewed:  4/23/2019   # of Personal Factors/Comorbidities that affect the Plan of Care: 1-2: MODERATE COMPLEXITY   EXAMINATION:   Observation/Orthostatic Postural Assessment:          Pt wearing a walking boot on R foot. Femoral IR B. Palpation:          No tenderness noted. ROM:     AROM  Right  DF-PF  7-0-62  Decreased great toe ext B Left  3-0-63   Strength:     5/5 B LE's however pt consistently reports weakness in hips, legs, ankles       Neurological Screen:        unremarkable  Functional Mobility:         WNL  Balance:          Not assessed   Body Structures Involved:  1. Nerves  2. Bones  3. Joints  4. Muscles Body Functions Affected:  1. Mental  2. Sensory/Pain  3. Neuromusculoskeletal  4. Movement Related Activities and Participation Affected:  1. General Tasks and Demands  2. Self Care  3. Domestic Life  4. Interpersonal Interactions and Relationships  5. Community, Social and [place]   # of elements that affect the Plan of Care: 3: MODERATE COMPLEXITY   CLINICAL PRESENTATION:   Presentation: Evolving clinical presentation with changing clinical characteristics: MODERATE COMPLEXITY   CLINICAL DECISION MAKING:   Tool Used: Lower Extremity Functional Scale (LEFS)  Score:  Initial: 53/80 Most Recent: X/80 (Date: -- )   Interpretation of Score: 20 questions each scored on a 5 point scale with 0 representing \"extreme difficulty or unable to perform\" and 4 representing \"no difficulty\". The lower the score, the greater the functional disability. 80/80 represents no disability. Minimal detectable change is 9 points. Medical Necessity:   · Patient demonstrates fair rehab potential due to higher previous functional level. Reason for Services/Other Comments:  · Patient continues to require present interventions due to patient's inability to perform normal WB activities outside of boot.    Use of outcome tool(s) and clinical judgement create a POC that gives a: Questionable prediction of patient's progress: MODERATE COMPLEXITY     Total Treatment Duration:  PT Patient Time In/Time Out  Time In: 0200  Time Out: 0245     Yessi Rivers, PT

## 2019-04-23 NOTE — PROGRESS NOTES
[facility]  : 1995  Primary: [employer]  Secondary:  [address] [facility] @ [address] [place] [address]  [phone]   [phone]      OUTPATIENT PHYSICAL THERAPY: Daily Treatment Note  2019   ICD-10: Treatment Diagnosis: Pain in right foot (M79.671)     Effective Dates: 2019 TO 2019 (30 days). Frequency/Duration: 2 times a week for 30 Days  GOALS: (Goals have been discussed and agreed upon with patient.)  Short-Term Functional Goals: Time Frame: 4 weeks  1. Pt to report compliance with HEP. 2. Pt to demonstrate correct ex performance with HEP. 3. Pt to demonstrate understanding of RSD and its successful management.  _________________________________________________________________________  Pre-treatment Symptoms/Complaints:  Pain in R foot, N/T both legs and feet. Pain: Initial: 5/10 Post Session:  5/10   Medications Last Reviewed:  2019    Updated Objective Findings:   AROM  Right  DF-PF  7-0-62  Decreased great toe ext B Left  3-0-63   Strength:     5/5 B LE's however pt consistently reports weakness in hips, legs, ankles        Tool Used: Lower Extremity Functional Scale (LEFS)  Score:  Initial: 53/80 Most Recent: X (Date: -- )      See evaluation note from today     TREATMENT:   THERAPEUTIC ACTIVITY: ( see below for minutes): Therapeutic activities per grid below to improve mobility. Required moderate verbal and manual cues to improve functional mobility . THERAPEUTIC EXERCISE: (see below for minutes):  Exercises per grid below to improve strength. Required moderate verbal and manual cues to promote proper body alignment, promote proper body posture, promote proper body mechanics and promote proper body breathing techniques. Progressed resistance, range, repetitions and complexity of movement as indicated.   MANUAL THERAPY: (see below for minutes): Joint mobilization and Soft tissue mobilization was utilized and necessary because of the patient's restricted joint motion, painful spasm, loss of articular motion and restricted motion of soft tissue. MODALITIES: (see below for minutes):      for pain modulation       Date: 4/23/19       Modalities:                        Therapeutic Exercise: 15 mins       S/L hip abd B X 30       Clams B level 2  X 30       PROM great toe ext 5 mins                                               Manual Therapy:                        Therapeutic Activities: 20 mins       Pt education regarding RSD, postural education, HEP 15 mins       Bridging without hams, paraspinals X 15                 HEP: pt to continue HEP with emphasis on recruiting gluts and quads and not hams and paraspinals    [employer] Portal  Treatment/Session Summary:    · Response to Treatment:  Pt had good bud to ex's. She had fair understanding of muscle recruitment patterns. · Communication/Consultation:  None today  · Equipment provided today:  a MT pad was place in boot to help relieve stress across area  · Recommendations/Intent for next treatment session: Next visit will focus on strengthening, pt education, stretching.     Total Treatment Billable Duration:  45 mins  PT Patient Time In/Time Out  Time In: 0200  Time Out: 0245    Laurel Rivers, PT

## 2019-05-03 NOTE — THERAPY DISCHARGE
Azam Hale   (:1995) [address], [address] [place] [address]  [phone]   [phone]           Discharge summary    REFERRING PHYSICIAN: Jacquie Bell MD  Return Physician Appointment: 19  MEDICAL/REFERRING DIAGNOSIS:  · Pain in right foot [M79.671]  ATTENDANCE: Azam Hale has attended 1 out of 2 visits, with 0 cancellation(s) and 1 no shows. ASSESSMENT:  DATE: 5/3/2019    PROGRESS: Azam Hale attended her initial visit but was a no show for the second visit. She did not schedule additional visits. RECOMMENDATIONS: Pt has been discharged from physical therapy due to noncompliance with attendance.     Thank you for this referral,    Timmy Rivers, PT

## (undated) DEVICE — SUTURE MCRYL SZ 3-0 L27IN ABSRB UD L19MM PS-2 3/8 CIR PRIM Y427H

## (undated) DEVICE — STERILE HOOK LOCK LATEX FREE ELASTIC BANDAGE 4INX5YD: Brand: HOOK LOCK™

## (undated) DEVICE — SUT ETHLN 3-0 18IN PS1 BLK --

## (undated) DEVICE — DRSG GZ OIL EMUL CURAD 3X8 --

## (undated) DEVICE — AMD ANTIMICROBIAL BANDAGE ROLL,6 PLY: Brand: KERLIX

## (undated) DEVICE — ZIMMER® STERILE DISPOSABLE TOURNIQUET CUFF WITH PLC, DUAL PORT, SINGLE BLADDER, 18 IN. (46 CM)

## (undated) DEVICE — REM POLYHESIVE ADULT PATIENT RETURN ELECTRODE: Brand: VALLEYLAB

## (undated) DEVICE — SOLUTION IV 1000ML 0.9% SOD CHL

## (undated) DEVICE — PAD,ABDOMINAL,5"X9",STERILE,LF,1/PK: Brand: MEDLINE INDUSTRIES, INC.

## (undated) DEVICE — AMD ANTIMICROBIAL GAUZE SPONGES,12 PLY USP TYPE VII, 0.2% POLYHEXAMETHYLENE BIGUANIDE HCI (PHMB): Brand: CURITY

## (undated) DEVICE — 2000CC GUARDIAN II: Brand: GUARDIAN

## (undated) DEVICE — (D)PREP SKN CHLRAPRP APPL 26ML -- CONVERT TO ITEM 371833

## (undated) DEVICE — Device

## (undated) DEVICE — BANDAGE,GAUZE,CONFORMING,4"X75",STRL,LF: Brand: MEDLINE

## (undated) DEVICE — BUTTON SWITCH PENCIL BLADE ELECTRODE, HOLSTER: Brand: EDGE